# Patient Record
Sex: FEMALE | Race: BLACK OR AFRICAN AMERICAN | NOT HISPANIC OR LATINO | Employment: OTHER | URBAN - METROPOLITAN AREA
[De-identification: names, ages, dates, MRNs, and addresses within clinical notes are randomized per-mention and may not be internally consistent; named-entity substitution may affect disease eponyms.]

---

## 2017-01-18 ENCOUNTER — OFFICE VISIT (OUTPATIENT)
Dept: ENDOCRINOLOGY | Facility: CLINIC | Age: 57
End: 2017-01-18

## 2017-01-18 VITALS
SYSTOLIC BLOOD PRESSURE: 122 MMHG | DIASTOLIC BLOOD PRESSURE: 76 MMHG | HEIGHT: 62 IN | WEIGHT: 197.8 LBS | OXYGEN SATURATION: 99 % | BODY MASS INDEX: 36.4 KG/M2 | HEART RATE: 78 BPM

## 2017-01-18 DIAGNOSIS — IMO0001 UNCONTROLLED TYPE 2 DIABETES MELLITUS WITHOUT COMPLICATION, WITHOUT LONG-TERM CURRENT USE OF INSULIN: Primary | ICD-10-CM

## 2017-01-18 DIAGNOSIS — E78.2 MIXED HYPERLIPIDEMIA: ICD-10-CM

## 2017-01-18 DIAGNOSIS — E11.9 CONTROLLED TYPE 2 DIABETES MELLITUS WITHOUT COMPLICATION, WITHOUT LONG-TERM CURRENT USE OF INSULIN (HCC): Primary | ICD-10-CM

## 2017-01-18 LAB
ALBUMIN SERPL-MCNC: 4.4 G/DL (ref 3.2–4.8)
ALBUMIN/GLOB SERPL: 1.3 G/DL (ref 1.5–2.5)
ALP SERPL-CCNC: 112 U/L (ref 25–100)
ALT SERPL W P-5'-P-CCNC: 28 U/L (ref 7–40)
ANION GAP SERPL CALCULATED.3IONS-SCNC: 13 MMOL/L (ref 3–11)
ARTICHOKE IGE QN: 166 MG/DL (ref 0–130)
AST SERPL-CCNC: 34 U/L (ref 0–33)
BILIRUB SERPL-MCNC: 0.4 MG/DL (ref 0.3–1.2)
BUN BLD-MCNC: 14 MG/DL (ref 9–23)
BUN/CREAT SERPL: 17.5 (ref 7–25)
CALCIUM SPEC-SCNC: 9.9 MG/DL (ref 8.7–10.4)
CHLORIDE SERPL-SCNC: 105 MMOL/L (ref 99–109)
CHOLEST SERPL-MCNC: 236 MG/DL (ref 0–200)
CO2 SERPL-SCNC: 29 MMOL/L (ref 20–31)
CREAT BLD-MCNC: 0.8 MG/DL (ref 0.6–1.3)
GFR SERPL CREATININE-BSD FRML MDRD: 90 ML/MIN/1.73
GLOBULIN UR ELPH-MCNC: 3.4 GM/DL
GLUCOSE BLD-MCNC: 107 MG/DL (ref 70–100)
GLUCOSE BLDC GLUCOMTR-MCNC: 119 MG/DL (ref 70–130)
HBA1C MFR BLD: 6.8 %
HDLC SERPL-MCNC: 48 MG/DL (ref 40–60)
POTASSIUM BLD-SCNC: 4.2 MMOL/L (ref 3.5–5.5)
PROT SERPL-MCNC: 7.8 G/DL (ref 5.7–8.2)
SODIUM BLD-SCNC: 147 MMOL/L (ref 132–146)
TRIGL SERPL-MCNC: 117 MG/DL (ref 0–150)
TSH SERPL DL<=0.05 MIU/L-ACNC: 1.7 MIU/ML (ref 0.35–5.35)

## 2017-01-18 PROCEDURE — 84443 ASSAY THYROID STIM HORMONE: CPT | Performed by: INTERNAL MEDICINE

## 2017-01-18 PROCEDURE — 80061 LIPID PANEL: CPT | Performed by: INTERNAL MEDICINE

## 2017-01-18 PROCEDURE — 82043 UR ALBUMIN QUANTITATIVE: CPT | Performed by: INTERNAL MEDICINE

## 2017-01-18 PROCEDURE — 83036 HEMOGLOBIN GLYCOSYLATED A1C: CPT | Performed by: INTERNAL MEDICINE

## 2017-01-18 PROCEDURE — 82570 ASSAY OF URINE CREATININE: CPT | Performed by: INTERNAL MEDICINE

## 2017-01-18 PROCEDURE — 82947 ASSAY GLUCOSE BLOOD QUANT: CPT | Performed by: INTERNAL MEDICINE

## 2017-01-18 PROCEDURE — 99213 OFFICE O/P EST LOW 20 MIN: CPT | Performed by: INTERNAL MEDICINE

## 2017-01-18 PROCEDURE — 80053 COMPREHEN METABOLIC PANEL: CPT | Performed by: INTERNAL MEDICINE

## 2017-01-18 NOTE — MR AVS SNAPSHOT
Sophie Medina   1/18/2017 10:00 AM   Office Visit    Dept Phone:  [phone]   Encounter #:  [id number]    Provider:  Nicki Ayala MD   Department:  [facility] MEDICINE AND ENDOCRINOLOGY                Your Full Care Plan              Your Updated Medication List          This list is accurate as of: 1/18/17 11:32 AM.  Always use your most recent med list.                alclomethasone 0.05 % cream   Commonly known as:  ACLOVATE       coenzyme Q10 100 MG capsule       ELDERBERRY PO       Healy Seal 500 MG capsule       HAIR SKIN AND NAILS FORMULA PO       levocetirizine 5 MG tablet   Commonly known as:  XYZAL       * lidocaine 5 %   Commonly known as:  LIDODERM       * lidocaine 5 % ointment   Commonly known as:  XYLOCAINE       metFORMIN 500 MG tablet   Commonly known as:  GLUCOPHAGE   Take 1 tablet by mouth 2 (two) times a day with meals.       MINIVELLE 0.1 MG/24HR patch   Generic drug:  estradiol       potassium chloride 10 MEQ CR tablet   Commonly known as:  K-DUR       prenatal (CLASSIC) vitamin 28-0.8 MG tablet tablet       Red Yeast Rice 600 MG capsule       Turmeric 450 MG capsule       vitamin D3 5000 UNITS capsule capsule       ZINC + VITAMIN C MT       * Notice:  This list has 2 medication(s) that are the same as other medications prescribed for you. Read the directions carefully, and ask your doctor or other care provider to review them with you.            We Performed the Following     Comprehensive Metabolic Panel     Lipid Panel     Microalbumin / Creatinine Urine Ratio     POC Glucose Fingerstick     POC Glycosylated Hemoglobin (Hb A1C)     TSH       You Were Diagnosed With        Codes Comments    Controlled type 2 diabetes mellitus without complication, without long-term current use of insulin    -  Primary ICD-10-CM: E11.9  ICD-9-CM: 250.00     Uncontrolled type 2 diabetes mellitus without complication, without long-term current use of  "insulin     ICD-10-CM: E11.65  ICD-9-CM: 250.02       Instructions     None    Patient Instructions History      Upcoming Appointments     Visit Type Date Time Department    [facility] 2017 10:00 AM [facility]    FOLLOW UP 2017  9:00 AM [facility]      MyChart Signup     [facility] [employer] allows you to send messages to your doctor, view your test results, renew your prescriptions, schedule appointments, and more. To sign up, go to [employer] and click on the Sign Up Now link in the New User? box. Enter your [employer] Activation Code exactly as it appears below along with the last four digits of your Social Security Number and your Date of Birth () to complete the sign-up process. If you do not sign up before the expiration date, you must request a new code.    [employer] Activation Code: [email or web address]  Expires: 2017 11:32 AM    If you have questions, you can email [email or web address] or call [phone] to talk to our [employer] staff. Remember, [employer] is NOT to be used for urgent needs. For medical emergencies, dial 911.               Other Info from Your Visit           Your Appointments     2017  9:00 AM EDT   Follow Up with Nicki Ayala MD   [facility] INTER[facility]ICINE [facility]OCRINOLOGY (--)    [address] [place] [id number] [phone]           Arrive 15 minutes prior to appointment.              Allergies     No Known Allergies      Reason for Visit     Diabetes Follow UP  Retired . doing good.       Vital Signs     Blood Pressure Pulse Height Weight Oxygen Saturation Body Mass Index    122/76 78 62\" (157.5 cm) 197 lb 12.8 oz (89.7 kg) 99% 36.18 kg/m2    Smoking Status                   Never Smoker           Problems and Diagnoses Noted     Uncontrolled diabetes mellitus type 2 without complications      Results     POC Glucose Fingerstick      Component Value Standard Range & Units    Glucose 119 " 70 - 130 mg/dL                POC Glycosylated Hemoglobin (Hb A1C)      Component Value Standard Range & Units    Hemoglobin A1C 6.8 %

## 2017-01-18 NOTE — PROGRESS NOTES
"Chief Complaint   Patient presents with   • Diabetes     Follow UP         HPI:   Sophie Medina is a 56 y.o.female who returns to endocrine clinic for f/u evaluation of pt's Type 2 DM. Last visit 08/31/2016. Her history is as follows:    Interim Events: no new medical issues. No new medications.      1) Type 2 DM with no known associated complications at this time:  - diagnosed in 2014 during routine evaluation. Presented with c/o polyuria  - pt states she did not tolerate the ER metformin prescribed in 8/2016 - said it caused \"low blood sugars\" and \"dizziness\"    Current DM Medications:  - metformin 500 mg BID    Glucometer Review: pre-meal BGs in AM and occasionally pre-dinner reviewed on her iPhone. Majority were <150    DM Health Maintenance:  Ophtho: Last exam 2016 - no retinopathy per pt  Podiatry: N/A  Monofilament: due 2017  Lipids: (01/2017) Tchol 236, , HDL 48,  - pt taking red yeast rice  Urine Microalb/Cr ratio: (01/2017) 2.4 - normal  TSH: (01/2017) 1.702 - normal  Aspirin: N/A    Other medical history: recently diagnosed with RA, but not on plaquenil yet     Review of Systems   Constitutional: Negative for fatigue.   HENT: Negative.    Eyes: Positive for itching. Negative for pain, redness and visual disturbance.   Respiratory: Positive for shortness of breath (with exertion).    Cardiovascular: Negative.  Negative for chest pain.   Gastrointestinal: Negative.    Endocrine: Negative for polydipsia, polyphagia and polyuria.        Hot flashes   Genitourinary: Positive for pelvic pain.   Musculoskeletal: Positive for arthralgias, joint swelling and myalgias.   Skin: Negative.    Neurological: Positive for weakness. Negative for dizziness and headaches.   Hematological: Negative.    Psychiatric/Behavioral: Negative.  Negative for sleep disturbance.     The following portions of the patient's history were reviewed and updated as appropriate: allergies, current medications, past family " "history, past medical history, past social history, past surgical history and problem list.    Visit Vitals   • /76   • Pulse 78   • Ht 62\" (157.5 cm)   • Wt 197 lb 12.8 oz (89.7 kg)   • SpO2 99%   • BMI 36.18 kg/m2     Physical Exam   Constitutional: She is oriented to person, place, and time. She appears well-developed. No distress.   Obese     HENT:   Head: Normocephalic.   Mouth/Throat: Oropharynx is clear and moist.   Eyes: Conjunctivae and EOM are normal. Pupils are equal, round, and reactive to light.   Neck: Neck supple. No thyromegaly present.   No palpable thyroid nodules     Cardiovascular: Normal rate, regular rhythm and normal heart sounds.    No murmur heard.  Pulmonary/Chest: Effort normal and breath sounds normal. She has no wheezes.   Abdominal: Soft. Bowel sounds are normal. She exhibits no mass. There is no tenderness.   Musculoskeletal: She exhibits no edema.   Lymphadenopathy:     She has no cervical adenopathy.   Neurological: She is alert and oriented to person, place, and time. No cranial nerve deficit.   Skin: Skin is warm. No rash noted. No erythema.   Acanthosis nigricans   Psychiatric: She has a normal mood and affect. Her behavior is normal.   Vitals reviewed.    LABS/IMAGING: outside records reviewed and summarized in HPI  Results for orders placed or performed in visit on 01/18/17   Comprehensive Metabolic Panel   Result Value Ref Range    Glucose 107 (H) 70 - 100 mg/dL    BUN 14 9 - 23 mg/dL    Creatinine 0.80 0.60 - 1.30 mg/dL    Sodium 147 (H) 132 - 146 mmol/L    Potassium 4.2 3.5 - 5.5 mmol/L    Chloride 105 99 - 109 mmol/L    CO2 29.0 20.0 - 31.0 mmol/L    Calcium 9.9 8.7 - 10.4 mg/dL    Total Protein 7.8 5.7 - 8.2 g/dL    Albumin 4.40 3.20 - 4.80 g/dL    ALT (SGPT) 28 7 - 40 U/L    AST (SGOT) 34 (H) 0 - 33 U/L    Alkaline Phosphatase 112 (H) 25 - 100 U/L    Total Bilirubin 0.4 0.3 - 1.2 mg/dL    eGFR  African Amer 90 >60 mL/min/1.73    Globulin 3.4 gm/dL    A/G Ratio 1.3 " (L) 1.5 - 2.5 g/dL    BUN/Creatinine Ratio 17.5 7.0 - 25.0    Anion Gap 13.0 (H) 3.0 - 11.0 mmol/L   Lipid Panel   Result Value Ref Range    Total Cholesterol 236 (H) 0 - 200 mg/dL    Triglycerides 117 0 - 150 mg/dL    HDL Cholesterol 48 40 - 60 mg/dL    LDL Cholesterol  166 (H) 0 - 130 mg/dL   TSH   Result Value Ref Range    TSH 1.702 0.350 - 5.350 mIU/mL   Microalbumin / Creatinine Urine Ratio   Result Value Ref Range    Creatinine, Urine 148.0 Not Estab. mg/dL    Microalbumin, Urine 3.6 Not Estab. ug/mL    Microalbumin/Creatinine Ratio Urine 2.4 0.0 - 30.0 mg/g creat   POC Glucose Fingerstick   Result Value Ref Range    Glucose 119 70 - 130 mg/dL   POC Glycosylated Hemoglobin (Hb A1C)   Result Value Ref Range    Hemoglobin A1C 6.8 %     ASSESSMENT/PLAN:  1) Type 2 DM with no associated complications at this time: controlled  - Hgb A1C% 6.8 today, down from 8.0  - I counseled pt on potential microvascular and macrovascular complications from uncontrolled diabetes; the significant positive effect carb consistent meal planning and modest weight loss can have on glycemic control; blood glucose goals for complication prevention; and mechanism of action of metformin  - continue metformin 500 mg BID  - counseled pt to check BG three times a week, pre-meal and 2 hr post-prandial varying the meal at each check    2) mixed hyperlipidemia  - lipid panel checked today  - Counseled pt that a statin is recommended for primary prevention of CVD  Although she is on red yeast rice, this provides minimal equivalent of Lovastatin and is not effective at primary prevention of CVD. Is not considered standard of care per ADA guidelines. Pt will contact me if she is agreeable to starting pravastatin 20 mg daily    RTC 6 months    Pt called and agreeable to starting pravastatin 20 mg daily. Stopping red yeast rice

## 2017-01-19 LAB
CREAT 24H UR-MCNC: 148 MG/DL
MICROALB/CRT. RATIO UR: 2.4 MG/G CREAT (ref 0–30)
MICROALBUMIN UR-MCNC: 3.6 UG/ML

## 2017-01-22 ENCOUNTER — TELEPHONE (OUTPATIENT)
Dept: ENDOCRINOLOGY | Facility: CLINIC | Age: 57
End: 2017-01-22

## 2017-01-22 PROBLEM — E78.2 MIXED HYPERLIPIDEMIA: Status: ACTIVE | Noted: 2017-01-22

## 2017-01-22 RX ORDER — PRAVASTATIN SODIUM 20 MG
20 TABLET ORAL DAILY
Qty: 30 TABLET | Refills: 11 | Status: SHIPPED | OUTPATIENT
Start: 2017-01-22 | End: 2017-09-14 | Stop reason: DRUGHIGH

## 2017-05-18 RX ORDER — LANCETS 28 GAUGE
EACH MISCELLANEOUS
Qty: 100 EACH | Refills: 5 | Status: SHIPPED | OUTPATIENT
Start: 2017-05-18 | End: 2021-11-30 | Stop reason: SDUPTHER

## 2017-07-26 ENCOUNTER — TELEPHONE (OUTPATIENT)
Dept: INTERNAL MEDICINE | Facility: CLINIC | Age: 57
End: 2017-07-26

## 2017-07-26 DIAGNOSIS — E78.2 MIXED HYPERLIPIDEMIA: ICD-10-CM

## 2017-07-26 DIAGNOSIS — E11.9 CONTROLLED TYPE 2 DIABETES MELLITUS WITHOUT COMPLICATION, WITHOUT LONG-TERM CURRENT USE OF INSULIN (HCC): Primary | ICD-10-CM

## 2017-09-13 ENCOUNTER — OFFICE VISIT (OUTPATIENT)
Dept: ENDOCRINOLOGY | Facility: CLINIC | Age: 57
End: 2017-09-13

## 2017-09-13 VITALS
DIASTOLIC BLOOD PRESSURE: 74 MMHG | WEIGHT: 190.7 LBS | OXYGEN SATURATION: 99 % | SYSTOLIC BLOOD PRESSURE: 124 MMHG | BODY MASS INDEX: 35.09 KG/M2 | HEIGHT: 62 IN | HEART RATE: 76 BPM

## 2017-09-13 DIAGNOSIS — E11.9 CONTROLLED TYPE 2 DIABETES MELLITUS WITHOUT COMPLICATION, WITHOUT LONG-TERM CURRENT USE OF INSULIN (HCC): Primary | ICD-10-CM

## 2017-09-13 DIAGNOSIS — E78.2 MIXED HYPERLIPIDEMIA: ICD-10-CM

## 2017-09-13 LAB
ALBUMIN SERPL-MCNC: 4.6 G/DL (ref 3.2–4.8)
ALBUMIN/GLOB SERPL: 1.5 G/DL (ref 1.5–2.5)
ALP SERPL-CCNC: 125 U/L (ref 25–100)
ALT SERPL W P-5'-P-CCNC: 28 U/L (ref 7–40)
ANION GAP SERPL CALCULATED.3IONS-SCNC: 4 MMOL/L (ref 3–11)
ARTICHOKE IGE QN: 122 MG/DL (ref 0–130)
AST SERPL-CCNC: 30 U/L (ref 0–33)
BILIRUB SERPL-MCNC: 0.4 MG/DL (ref 0.3–1.2)
BUN BLD-MCNC: 15 MG/DL (ref 9–23)
BUN/CREAT SERPL: 16.7 (ref 7–25)
CALCIUM SPEC-SCNC: 9.2 MG/DL (ref 8.7–10.4)
CHLORIDE SERPL-SCNC: 106 MMOL/L (ref 99–109)
CHOLEST SERPL-MCNC: 185 MG/DL (ref 0–200)
CO2 SERPL-SCNC: 28 MMOL/L (ref 20–31)
CREAT BLD-MCNC: 0.9 MG/DL (ref 0.6–1.3)
DEPRECATED RDW RBC AUTO: 39.6 FL (ref 37–54)
ERYTHROCYTE [DISTWIDTH] IN BLOOD BY AUTOMATED COUNT: 12.6 % (ref 11.3–14.5)
GFR SERPL CREATININE-BSD FRML MDRD: 79 ML/MIN/1.73
GLOBULIN UR ELPH-MCNC: 3.1 GM/DL
GLUCOSE BLD-MCNC: 88 MG/DL (ref 70–100)
GLUCOSE BLDC GLUCOMTR-MCNC: 114 MG/DL (ref 70–130)
HBA1C MFR BLD: 6.5 %
HCT VFR BLD AUTO: 39.9 % (ref 34.5–44)
HDLC SERPL-MCNC: 46 MG/DL (ref 40–60)
HGB BLD-MCNC: 13.2 G/DL (ref 11.5–15.5)
MCH RBC QN AUTO: 28.3 PG (ref 27–31)
MCHC RBC AUTO-ENTMCNC: 33.1 G/DL (ref 32–36)
MCV RBC AUTO: 85.6 FL (ref 80–99)
PLATELET # BLD AUTO: 312 10*3/MM3 (ref 150–450)
PMV BLD AUTO: 9.6 FL (ref 6–12)
POTASSIUM BLD-SCNC: 4.5 MMOL/L (ref 3.5–5.5)
PROT SERPL-MCNC: 7.7 G/DL (ref 5.7–8.2)
RBC # BLD AUTO: 4.66 10*6/MM3 (ref 3.89–5.14)
SODIUM BLD-SCNC: 138 MMOL/L (ref 132–146)
TRIGL SERPL-MCNC: 120 MG/DL (ref 0–150)
WBC NRBC COR # BLD: 7.33 10*3/MM3 (ref 3.5–10.8)

## 2017-09-13 PROCEDURE — 80061 LIPID PANEL: CPT | Performed by: INTERNAL MEDICINE

## 2017-09-13 PROCEDURE — 85027 COMPLETE CBC AUTOMATED: CPT | Performed by: INTERNAL MEDICINE

## 2017-09-13 PROCEDURE — 80053 COMPREHEN METABOLIC PANEL: CPT | Performed by: INTERNAL MEDICINE

## 2017-09-13 PROCEDURE — 83036 HEMOGLOBIN GLYCOSYLATED A1C: CPT | Performed by: INTERNAL MEDICINE

## 2017-09-13 PROCEDURE — 99213 OFFICE O/P EST LOW 20 MIN: CPT | Performed by: INTERNAL MEDICINE

## 2017-09-13 PROCEDURE — 82962 GLUCOSE BLOOD TEST: CPT | Performed by: INTERNAL MEDICINE

## 2017-09-13 NOTE — PROGRESS NOTES
"Chief Complaint   Patient presents with   • Diabetes     Follow Up        HPI:   Sophie Medina is a 56 y.o.female who returns to endocrine clinic for f/u evaluation of pt's Type 2 DM. Last visit 01/18/2017. Her history is as follows:    Interim Events: no new medical issues. Tolerating medications    1) Type 2 DM with no known associated complications at this time:  - diagnosed in 2014 during routine evaluation. Presented with c/o polyuria  - pt states she did not tolerate the ER metformin prescribed in 8/2016 - said it caused \"low blood sugars\" and \"dizziness\"    Current DM Medications:  - metformin 500 mg BID    Glucometer Review: no meter or log book for review    DM Health Maintenance:  Ophtho: Last exam 2016 - no retinopathy per pt  Podiatry: N/A  Monofilament / foot exam: 09/13/2017  Lipids: (09/2017) Tchol 185, , HDL 46, - pt on pravastatin 20 mg daily  Urine Microalb/Cr ratio: (01/2017) 2.4 - normal  TSH: (01/2017) 1.702 - normal  Aspirin: N/A    2) mixed hyperlipidemia:  - tolerating pravastatin 20 mg daily  - lipid panel checked today    Other medical history: diagnosed with RA, but not on plaquenil      Review of Systems   Constitutional: Negative for fatigue.        Weight loss   HENT: Negative.    Eyes: Negative for pain, redness, itching and visual disturbance.   Respiratory: Positive for shortness of breath (with exertion).    Cardiovascular: Negative.  Negative for chest pain.   Gastrointestinal: Negative.    Endocrine: Negative for polydipsia, polyphagia and polyuria.        Hot flashes   Genitourinary: Negative.    Musculoskeletal: Positive for arthralgias, joint swelling and myalgias.   Skin: Negative.    Neurological: Negative.  Negative for dizziness and headaches.   Hematological: Negative.    Psychiatric/Behavioral: Negative.  Negative for sleep disturbance.     The following portions of the patient's history were reviewed and updated as appropriate: allergies, current " "medications, past family history, past medical history, past social history, past surgical history and problem list.    /74  Pulse 76  Ht 62\" (157.5 cm)  Wt 190 lb 11.2 oz (86.5 kg)  SpO2 99%  BMI 34.88 kg/m2  Physical Exam   Constitutional: She is oriented to person, place, and time. She appears well-developed. No distress.   Obese     HENT:   Head: Normocephalic.   Mouth/Throat: Oropharynx is clear and moist.   Eyes: Conjunctivae and EOM are normal. Pupils are equal, round, and reactive to light.   Neck: Neck supple. No thyromegaly present.   No palpable thyroid nodules     Cardiovascular: Normal rate, regular rhythm and normal heart sounds.    No murmur heard.  Pulmonary/Chest: Effort normal and breath sounds normal. She has no wheezes.   Abdominal: Soft. Bowel sounds are normal. She exhibits no mass. There is no tenderness.   Musculoskeletal: She exhibits no edema.    Sophie had a diabetic foot exam performed today.   During the foot exam she had a monofilament test performed (normal sensation bilaterally).    Vascular Status -  Her exam exhibits right foot vasculature normal. Her exam exhibits no right foot edema. Her exam exhibits left foot vasculature normal. Her exam exhibits no left foot edema.   Skin Integrity  -  Her right foot skin is intact.   She has no right foot onychomycosis and non-callous right foot.    Sophie 's left foot skin is intact.  She has no left foot onychomycosis and non-callous left foot..  Lymphadenopathy:     She has no cervical adenopathy.   Neurological: She is alert and oriented to person, place, and time. No cranial nerve deficit.   Skin: Skin is warm. No rash noted. No erythema.   Acanthosis nigricans   Psychiatric: She has a normal mood and affect. Her behavior is normal.   Vitals reviewed.    LABS/IMAGING: outside records reviewed and summarized in HPI  Results for orders placed or performed in visit on 09/13/17   CBC (No Diff)   Result Value Ref Range    WBC " 7.33 3.50 - 10.80 10*3/mm3    RBC 4.66 3.89 - 5.14 10*6/mm3    Hemoglobin 13.2 11.5 - 15.5 g/dL    Hematocrit 39.9 34.5 - 44.0 %    MCV 85.6 80.0 - 99.0 fL    MCH 28.3 27.0 - 31.0 pg    MCHC 33.1 32.0 - 36.0 g/dL    RDW 12.6 11.3 - 14.5 %    RDW-SD 39.6 37.0 - 54.0 fl    MPV 9.6 6.0 - 12.0 fL    Platelets 312 150 - 450 10*3/mm3   Comprehensive Metabolic Panel   Result Value Ref Range    Glucose 88 70 - 100 mg/dL    BUN 15 9 - 23 mg/dL    Creatinine 0.90 0.60 - 1.30 mg/dL    Sodium 138 132 - 146 mmol/L    Potassium 4.5 3.5 - 5.5 mmol/L    Chloride 106 99 - 109 mmol/L    CO2 28.0 20.0 - 31.0 mmol/L    Calcium 9.2 8.7 - 10.4 mg/dL    Total Protein 7.7 5.7 - 8.2 g/dL    Albumin 4.60 3.20 - 4.80 g/dL    ALT (SGPT) 28 7 - 40 U/L    AST (SGOT) 30 0 - 33 U/L    Alkaline Phosphatase 125 (H) 25 - 100 U/L    Total Bilirubin 0.4 0.3 - 1.2 mg/dL    eGFR  African Amer 79 >60 mL/min/1.73    Globulin 3.1 gm/dL    A/G Ratio 1.5 1.5 - 2.5 g/dL    BUN/Creatinine Ratio 16.7 7.0 - 25.0    Anion Gap 4.0 3.0 - 11.0 mmol/L   Lipid Panel   Result Value Ref Range    Total Cholesterol 185 0 - 200 mg/dL    Triglycerides 120 0 - 150 mg/dL    HDL Cholesterol 46 40 - 60 mg/dL    LDL Cholesterol  122 0 - 130 mg/dL   POC Glucose Fingerstick   Result Value Ref Range    Glucose 114 70 - 130 mg/dL   POC Glycosylated Hemoglobin (Hb A1C)   Result Value Ref Range    Hemoglobin A1C 6.5 %     ASSESSMENT/PLAN:  1) Type 2 DM with no associated complications at this time: controlled  - Hgb A1C% 6.5 today, down from 8.0 in 08/2016  - continue metformin 500 mg BID  - counseled pt to check BG three times a week, pre-meal and 2 hr post-prandial varying the meal at each check    2) mixed hyperlipidemia  - CMP, lipid panel checked today. LDL improved, but not at goal of < 100  - Counseled pt that a statin is recommended for primary prevention of CVD  - increasing pravastatin to 40 mg daily    Lab results reviewed with patient on 09/14/2017    RTC 6  months

## 2017-09-14 RX ORDER — PRAVASTATIN SODIUM 40 MG
40 TABLET ORAL DAILY
Qty: 30 TABLET | Refills: 11 | Status: SHIPPED | OUTPATIENT
Start: 2017-09-14 | End: 2018-03-16 | Stop reason: DRUGHIGH

## 2017-09-27 DIAGNOSIS — IMO0001 UNCONTROLLED DIABETES MELLITUS TYPE 2 WITHOUT COMPLICATIONS: ICD-10-CM

## 2017-09-27 DIAGNOSIS — E11.9 CONTROLLED TYPE 2 DIABETES MELLITUS WITHOUT COMPLICATION, WITHOUT LONG-TERM CURRENT USE OF INSULIN (HCC): ICD-10-CM

## 2018-03-14 ENCOUNTER — OFFICE VISIT (OUTPATIENT)
Dept: ENDOCRINOLOGY | Facility: CLINIC | Age: 58
End: 2018-03-14

## 2018-03-14 VITALS
WEIGHT: 195 LBS | DIASTOLIC BLOOD PRESSURE: 64 MMHG | HEART RATE: 72 BPM | BODY MASS INDEX: 35.67 KG/M2 | OXYGEN SATURATION: 99 % | SYSTOLIC BLOOD PRESSURE: 128 MMHG

## 2018-03-14 DIAGNOSIS — E11.9 CONTROLLED TYPE 2 DIABETES MELLITUS WITHOUT COMPLICATION, WITHOUT LONG-TERM CURRENT USE OF INSULIN (HCC): Primary | ICD-10-CM

## 2018-03-14 DIAGNOSIS — E78.2 MIXED HYPERLIPIDEMIA: ICD-10-CM

## 2018-03-14 LAB
ALBUMIN SERPL-MCNC: 4.4 G/DL (ref 3.2–4.8)
ALBUMIN/GLOB SERPL: 1.4 G/DL (ref 1.5–2.5)
ALP SERPL-CCNC: 90 U/L (ref 25–100)
ALT SERPL W P-5'-P-CCNC: 28 U/L (ref 7–40)
ANION GAP SERPL CALCULATED.3IONS-SCNC: 7 MMOL/L (ref 3–11)
ARTICHOKE IGE QN: 160 MG/DL (ref 0–130)
AST SERPL-CCNC: 29 U/L (ref 0–33)
BILIRUB SERPL-MCNC: 0.4 MG/DL (ref 0.3–1.2)
BUN BLD-MCNC: 13 MG/DL (ref 9–23)
BUN/CREAT SERPL: 14.4 (ref 7–25)
CALCIUM SPEC-SCNC: 9.3 MG/DL (ref 8.7–10.4)
CHLORIDE SERPL-SCNC: 109 MMOL/L (ref 99–109)
CHOLEST SERPL-MCNC: 205 MG/DL (ref 0–200)
CO2 SERPL-SCNC: 28 MMOL/L (ref 20–31)
CREAT BLD-MCNC: 0.9 MG/DL (ref 0.6–1.3)
DEPRECATED RDW RBC AUTO: 41.5 FL (ref 37–54)
ERYTHROCYTE [DISTWIDTH] IN BLOOD BY AUTOMATED COUNT: 13 % (ref 11.3–14.5)
GFR SERPL CREATININE-BSD FRML MDRD: 78 ML/MIN/1.73
GLOBULIN UR ELPH-MCNC: 3.2 GM/DL
GLUCOSE BLD-MCNC: 84 MG/DL (ref 70–100)
GLUCOSE BLDC GLUCOMTR-MCNC: 86 MG/DL (ref 70–130)
HBA1C MFR BLD: 5.9 %
HCT VFR BLD AUTO: 40.1 % (ref 34.5–44)
HDLC SERPL-MCNC: 47 MG/DL (ref 40–60)
HGB BLD-MCNC: 13.2 G/DL (ref 11.5–15.5)
MCH RBC QN AUTO: 28.6 PG (ref 27–31)
MCHC RBC AUTO-ENTMCNC: 32.9 G/DL (ref 32–36)
MCV RBC AUTO: 87 FL (ref 80–99)
PLATELET # BLD AUTO: 283 10*3/MM3 (ref 150–450)
PMV BLD AUTO: 10.1 FL (ref 6–12)
POTASSIUM BLD-SCNC: 4.7 MMOL/L (ref 3.5–5.5)
PROT SERPL-MCNC: 7.6 G/DL (ref 5.7–8.2)
RBC # BLD AUTO: 4.61 10*6/MM3 (ref 3.89–5.14)
SODIUM BLD-SCNC: 144 MMOL/L (ref 132–146)
TRIGL SERPL-MCNC: 73 MG/DL (ref 0–150)
TSH SERPL DL<=0.05 MIU/L-ACNC: 1.25 MIU/ML (ref 0.35–5.35)
WBC NRBC COR # BLD: 6.47 10*3/MM3 (ref 3.5–10.8)

## 2018-03-14 PROCEDURE — 82947 ASSAY GLUCOSE BLOOD QUANT: CPT | Performed by: INTERNAL MEDICINE

## 2018-03-14 PROCEDURE — 84443 ASSAY THYROID STIM HORMONE: CPT | Performed by: INTERNAL MEDICINE

## 2018-03-14 PROCEDURE — 80061 LIPID PANEL: CPT | Performed by: INTERNAL MEDICINE

## 2018-03-14 PROCEDURE — 99213 OFFICE O/P EST LOW 20 MIN: CPT | Performed by: INTERNAL MEDICINE

## 2018-03-14 PROCEDURE — 80053 COMPREHEN METABOLIC PANEL: CPT | Performed by: INTERNAL MEDICINE

## 2018-03-14 PROCEDURE — 85027 COMPLETE CBC AUTOMATED: CPT | Performed by: INTERNAL MEDICINE

## 2018-03-14 PROCEDURE — 83036 HEMOGLOBIN GLYCOSYLATED A1C: CPT | Performed by: INTERNAL MEDICINE

## 2018-03-14 PROCEDURE — 82570 ASSAY OF URINE CREATININE: CPT | Performed by: INTERNAL MEDICINE

## 2018-03-14 PROCEDURE — 82043 UR ALBUMIN QUANTITATIVE: CPT | Performed by: INTERNAL MEDICINE

## 2018-03-14 RX ORDER — BLOOD-GLUCOSE METER
KIT MISCELLANEOUS
Refills: 4 | COMMUNITY
Start: 2018-02-08 | End: 2021-11-30 | Stop reason: SDUPTHER

## 2018-03-14 RX ORDER — LIDOCAINE 50 MG/G
PATCH TOPICAL
Refills: 5 | COMMUNITY
Start: 2017-12-22 | End: 2022-12-05

## 2018-03-14 RX ORDER — CYCLOBENZAPRINE HCL 10 MG
10 TABLET ORAL
Refills: 3 | COMMUNITY
Start: 2018-01-23 | End: 2018-09-19

## 2018-03-14 RX ORDER — HYDROXYCHLOROQUINE SULFATE 200 MG/1
1 TABLET, FILM COATED ORAL 2 TIMES DAILY
Refills: 3 | COMMUNITY
Start: 2018-02-12

## 2018-03-14 NOTE — PROGRESS NOTES
"Chief Complaint   Patient presents with   • Type 2 Diabetes Mellitus     HPI:   Sophie Medina is a 57 y.o.female who returns to endocrine clinic for f/u evaluation of pt's Type 2 DM. Last visit 09/13/2017. Her history is as follows:    Interim Events: no new medical issues. Tolerating medications    1) Type 2 DM with no known associated complications at this time:  - diagnosed in 2014 during routine evaluation. Presented with c/o polyuria  - pt states she did not tolerate the ER metformin prescribed in 8/2016 - said it caused \"low blood sugars\" and \"dizziness\"    Current DM Medications:  - metformin 500 mg BID    Glucometer Review: no meter or log book for review    DM Health Maintenance:  Ophtho: Last exam 2016 - no retinopathy per pt  Podiatry: N/A  Monofilament / foot exam: 09/13/2017  Lipids: (03/2018) Tchol 205, TG 73, HDL 47, - pt on pravastatin 40 mg daily  Urine Microalb/Cr ratio: (03/2018) 4.7 - normal  TSH: (03/2018) 1.245 - normal  Aspirin: N/A    2) mixed hyperlipidemia:  - tolerating pravastatin 40 mg daily  - lipid panel checked today (03/2018) Tchol 205, TG 73, HDL 47,     Other medical history: diagnosed with RA, but not on plaquenil      Review of Systems   Constitutional: Negative for fatigue.        Weight stable   HENT: Negative.    Eyes: Negative for pain, redness, itching and visual disturbance.   Respiratory: Positive for shortness of breath (with exertion).    Cardiovascular: Negative.  Negative for chest pain.   Gastrointestinal: Negative.    Endocrine: Negative for polydipsia, polyphagia and polyuria.        Hot flashes   Genitourinary: Negative.    Musculoskeletal: Positive for arthralgias, joint swelling and myalgias.   Skin: Negative.    Neurological: Negative.  Negative for dizziness and headaches.   Hematological: Negative.    Psychiatric/Behavioral: Negative.  Negative for sleep disturbance.     The following portions of the patient's history were reviewed and updated " as appropriate: allergies, current medications, past family history, past medical history, past social history, past surgical history and problem list.    /64   Pulse 72   Wt 88.5 kg (195 lb)   SpO2 99%   BMI 35.67 kg/m²   Physical Exam   Constitutional: She is oriented to person, place, and time. She appears well-developed. No distress.   Obese     HENT:   Head: Normocephalic.   Mouth/Throat: Oropharynx is clear and moist.   Eyes: Conjunctivae and EOM are normal. Pupils are equal, round, and reactive to light.   Neck: Neck supple. No thyromegaly present.   No palpable thyroid nodules     Cardiovascular: Normal rate, regular rhythm and normal heart sounds.    No murmur heard.  Pulmonary/Chest: Effort normal and breath sounds normal. She has no wheezes.   Abdominal: Soft. Bowel sounds are normal. She exhibits no mass. There is no tenderness.   Musculoskeletal: She exhibits no edema.   Lymphadenopathy:     She has no cervical adenopathy.   Neurological: She is alert and oriented to person, place, and time. No cranial nerve deficit.   Skin: Skin is warm. No rash noted. No erythema.   Acanthosis nigricans   Psychiatric: She has a normal mood and affect. Her behavior is normal.   Vitals reviewed.    LABS/IMAGING: outside records reviewed and summarized in HPI  Results for orders placed or performed in visit on 03/14/18   POC Glucose Fingerstick   Result Value Ref Range    Glucose 86 70 - 130 mg/dL   POC Glycosylated Hemoglobin (Hb A1C)   Result Value Ref Range    Hemoglobin A1C 5.9 %     ASSESSMENT/PLAN:  1) Type 2 DM with no associated complications at this time: controlled  - Hgb A1C% 5.9 today, down from 6.5 in 09/2017  - continue metformin 500 mg BID  - counseled pt to check BG three times a week, pre-meal and 2 hr post-prandial varying the meal at each check    2) mixed hyperlipidemia:   - CMP, lipid panel checked today. LDL not at goal of < 100  - Counseled pt that a statin is recommended for primary  prevention of CVD  - changing to atorvastatin 20 mg daily    Lab results reviewed with patient via phone on 03/16/2018    RTC 6 months

## 2018-03-16 LAB
CREAT 24H UR-MCNC: 123.9 MG/DL
MICROALBUMIN UR-MCNC: 5.8 UG/ML
MICROALBUMIN/CREAT UR: 4.7 MG/G CREAT (ref 0–30)

## 2018-03-16 RX ORDER — ATORVASTATIN CALCIUM 20 MG/1
20 TABLET, FILM COATED ORAL DAILY
Qty: 30 TABLET | Refills: 11 | Status: SHIPPED | OUTPATIENT
Start: 2018-03-16 | End: 2019-01-14 | Stop reason: SDUPTHER

## 2018-09-19 ENCOUNTER — OFFICE VISIT (OUTPATIENT)
Dept: ENDOCRINOLOGY | Facility: CLINIC | Age: 58
End: 2018-09-19

## 2018-09-19 VITALS
BODY MASS INDEX: 36.07 KG/M2 | SYSTOLIC BLOOD PRESSURE: 116 MMHG | OXYGEN SATURATION: 98 % | HEART RATE: 75 BPM | HEIGHT: 62 IN | WEIGHT: 196 LBS | DIASTOLIC BLOOD PRESSURE: 80 MMHG

## 2018-09-19 DIAGNOSIS — E11.9 CONTROLLED TYPE 2 DIABETES MELLITUS WITHOUT COMPLICATION, WITHOUT LONG-TERM CURRENT USE OF INSULIN (HCC): Primary | ICD-10-CM

## 2018-09-19 DIAGNOSIS — E78.2 MIXED HYPERLIPIDEMIA: ICD-10-CM

## 2018-09-19 LAB
ARTICHOKE IGE QN: 85 MG/DL (ref 0–130)
CHOLEST SERPL-MCNC: 144 MG/DL (ref 0–200)
GLUCOSE BLDC GLUCOMTR-MCNC: 118 MG/DL (ref 70–130)
HBA1C MFR BLD: 5.7 %
HDLC SERPL-MCNC: 53 MG/DL (ref 40–60)
TRIGL SERPL-MCNC: 75 MG/DL (ref 0–150)

## 2018-09-19 PROCEDURE — 82947 ASSAY GLUCOSE BLOOD QUANT: CPT | Performed by: INTERNAL MEDICINE

## 2018-09-19 PROCEDURE — 99213 OFFICE O/P EST LOW 20 MIN: CPT | Performed by: INTERNAL MEDICINE

## 2018-09-19 PROCEDURE — 80061 LIPID PANEL: CPT | Performed by: INTERNAL MEDICINE

## 2018-09-19 PROCEDURE — 83036 HEMOGLOBIN GLYCOSYLATED A1C: CPT | Performed by: INTERNAL MEDICINE

## 2018-10-02 ENCOUNTER — TELEPHONE (OUTPATIENT)
Dept: ENDOCRINOLOGY | Facility: CLINIC | Age: 58
End: 2018-10-02

## 2018-10-17 ENCOUNTER — APPOINTMENT (OUTPATIENT)
Dept: GENERAL RADIOLOGY | Facility: HOSPITAL | Age: 58
End: 2018-10-17

## 2018-10-17 ENCOUNTER — HOSPITAL ENCOUNTER (EMERGENCY)
Facility: HOSPITAL | Age: 58
Discharge: HOME | End: 2018-10-17
Attending: EMERGENCY MEDICINE | Admitting: EMERGENCY MEDICINE

## 2018-10-17 VITALS
BODY MASS INDEX: 35.7 KG/M2 | TEMPERATURE: 98.4 F | HEIGHT: 62 IN | HEART RATE: 89 BPM | DIASTOLIC BLOOD PRESSURE: 100 MMHG | RESPIRATION RATE: 20 BRPM | OXYGEN SATURATION: 98 % | SYSTOLIC BLOOD PRESSURE: 155 MMHG | WEIGHT: 194 LBS

## 2018-10-17 DIAGNOSIS — R07.9 CHEST PAIN, UNSPECIFIED TYPE: Primary | ICD-10-CM

## 2018-10-17 LAB
ALBUMIN SERPL-MCNC: 4.7 G/DL (ref 3.2–4.8)
ALBUMIN/GLOB SERPL: 1.8 G/DL (ref 1.5–2.5)
ALP SERPL-CCNC: 103 U/L (ref 25–100)
ALT SERPL W P-5'-P-CCNC: 25 U/L (ref 7–40)
ANION GAP SERPL CALCULATED.3IONS-SCNC: 7 MMOL/L (ref 3–11)
AST SERPL-CCNC: 26 U/L (ref 0–33)
BASOPHILS # BLD AUTO: 0.02 10*3/MM3 (ref 0–0.2)
BASOPHILS NFR BLD AUTO: 0.2 % (ref 0–1)
BILIRUB SERPL-MCNC: 0.5 MG/DL (ref 0.3–1.2)
BNP SERPL-MCNC: 17 PG/ML (ref 0–100)
BUN BLD-MCNC: 12 MG/DL (ref 9–23)
BUN/CREAT SERPL: 11.3 (ref 7–25)
CALCIUM SPEC-SCNC: 9.4 MG/DL (ref 8.7–10.4)
CHLORIDE SERPL-SCNC: 105 MMOL/L (ref 99–109)
CO2 SERPL-SCNC: 27 MMOL/L (ref 20–31)
CREAT BLD-MCNC: 1.06 MG/DL (ref 0.6–1.3)
DEPRECATED RDW RBC AUTO: 41.4 FL (ref 37–54)
EOSINOPHIL # BLD AUTO: 0.27 10*3/MM3 (ref 0–0.3)
EOSINOPHIL NFR BLD AUTO: 2.9 % (ref 0–3)
ERYTHROCYTE [DISTWIDTH] IN BLOOD BY AUTOMATED COUNT: 13.1 % (ref 11.3–14.5)
GFR SERPL CREATININE-BSD FRML MDRD: 65 ML/MIN/1.73
GLOBULIN UR ELPH-MCNC: 2.6 GM/DL
GLUCOSE BLD-MCNC: 65 MG/DL (ref 70–100)
HCT VFR BLD AUTO: 40.2 % (ref 34.5–44)
HGB BLD-MCNC: 13.3 G/DL (ref 11.5–15.5)
HOLD SPECIMEN: NORMAL
HOLD SPECIMEN: NORMAL
IMM GRANULOCYTES # BLD: 0.01 10*3/MM3 (ref 0–0.03)
IMM GRANULOCYTES NFR BLD: 0.1 % (ref 0–0.6)
LIPASE SERPL-CCNC: 26 U/L (ref 6–51)
LYMPHOCYTES # BLD AUTO: 1.61 10*3/MM3 (ref 0.6–4.8)
LYMPHOCYTES NFR BLD AUTO: 17.4 % (ref 24–44)
MCH RBC QN AUTO: 28.6 PG (ref 27–31)
MCHC RBC AUTO-ENTMCNC: 33.1 G/DL (ref 32–36)
MCV RBC AUTO: 86.5 FL (ref 80–99)
MONOCYTES # BLD AUTO: 0.96 10*3/MM3 (ref 0–1)
MONOCYTES NFR BLD AUTO: 10.4 % (ref 0–12)
NEUTROPHILS # BLD AUTO: 6.4 10*3/MM3 (ref 1.5–8.3)
NEUTROPHILS NFR BLD AUTO: 69.1 % (ref 41–71)
PLATELET # BLD AUTO: 281 10*3/MM3 (ref 150–450)
PMV BLD AUTO: 9.9 FL (ref 6–12)
POTASSIUM BLD-SCNC: 3.7 MMOL/L (ref 3.5–5.5)
PROT SERPL-MCNC: 7.3 G/DL (ref 5.7–8.2)
RBC # BLD AUTO: 4.65 10*6/MM3 (ref 3.89–5.14)
SODIUM BLD-SCNC: 139 MMOL/L (ref 132–146)
TROPONIN I SERPL-MCNC: 0 NG/ML (ref 0–0.07)
WBC NRBC COR # BLD: 9.26 10*3/MM3 (ref 3.5–10.8)
WHOLE BLOOD HOLD SPECIMEN: NORMAL
WHOLE BLOOD HOLD SPECIMEN: NORMAL

## 2018-10-17 PROCEDURE — 80053 COMPREHEN METABOLIC PANEL: CPT | Performed by: EMERGENCY MEDICINE

## 2018-10-17 PROCEDURE — 96374 THER/PROPH/DIAG INJ IV PUSH: CPT

## 2018-10-17 PROCEDURE — 99285 EMERGENCY DEPT VISIT HI MDM: CPT

## 2018-10-17 PROCEDURE — 85025 COMPLETE CBC W/AUTO DIFF WBC: CPT | Performed by: EMERGENCY MEDICINE

## 2018-10-17 PROCEDURE — 83880 ASSAY OF NATRIURETIC PEPTIDE: CPT | Performed by: EMERGENCY MEDICINE

## 2018-10-17 PROCEDURE — 84484 ASSAY OF TROPONIN QUANT: CPT

## 2018-10-17 PROCEDURE — 93005 ELECTROCARDIOGRAM TRACING: CPT | Performed by: EMERGENCY MEDICINE

## 2018-10-17 PROCEDURE — 83690 ASSAY OF LIPASE: CPT | Performed by: EMERGENCY MEDICINE

## 2018-10-17 PROCEDURE — 71045 X-RAY EXAM CHEST 1 VIEW: CPT

## 2018-10-17 RX ORDER — ALUMINA, MAGNESIA, AND SIMETHICONE 2400; 2400; 240 MG/30ML; MG/30ML; MG/30ML
15 SUSPENSION ORAL ONCE
Status: COMPLETED | OUTPATIENT
Start: 2018-10-17 | End: 2018-10-17

## 2018-10-17 RX ORDER — RANITIDINE 150 MG/1
150 TABLET ORAL 2 TIMES DAILY
Qty: 30 TABLET | Refills: 0 | Status: SHIPPED | OUTPATIENT
Start: 2018-10-17 | End: 2020-02-10

## 2018-10-17 RX ORDER — FAMOTIDINE 10 MG/ML
20 INJECTION, SOLUTION INTRAVENOUS ONCE
Status: COMPLETED | OUTPATIENT
Start: 2018-10-17 | End: 2018-10-17

## 2018-10-17 RX ORDER — ASPIRIN 81 MG/1
324 TABLET, CHEWABLE ORAL ONCE
Status: COMPLETED | OUTPATIENT
Start: 2018-10-17 | End: 2018-10-17

## 2018-10-17 RX ORDER — SUCRALFATE 1 G/1
1 TABLET ORAL
Qty: 60 TABLET | Refills: 0 | Status: SHIPPED | OUTPATIENT
Start: 2018-10-17 | End: 2019-01-23

## 2018-10-17 RX ORDER — SODIUM CHLORIDE 0.9 % (FLUSH) 0.9 %
10 SYRINGE (ML) INJECTION AS NEEDED
Status: DISCONTINUED | OUTPATIENT
Start: 2018-10-17 | End: 2018-10-17 | Stop reason: HOSPADM

## 2018-10-17 RX ADMIN — ASPIRIN 81 MG CHEWABLE TABLET 324 MG: 81 TABLET CHEWABLE at 18:36

## 2018-10-17 RX ADMIN — LIDOCAINE HYDROCHLORIDE 15 ML: 20 SOLUTION ORAL; TOPICAL at 19:13

## 2018-10-17 RX ADMIN — FAMOTIDINE 20 MG: 10 INJECTION, SOLUTION INTRAVENOUS at 19:11

## 2018-10-17 RX ADMIN — ALUMINUM HYDROXIDE, MAGNESIUM HYDROXIDE, AND DIMETHICONE 15 ML: 400; 400; 40 SUSPENSION ORAL at 19:14

## 2018-10-17 NOTE — ED PROVIDER NOTES
Subjective   Sophie Medina is a 58 y.o.female who presents to the ED with c/o chest pain with onset at 2100 last night. She reports that she suddenly developed substernal chest pain that radiates into her left arm and back. She also notes that she developed SoA secondary to her chest pain which prompted her visit to the ED. Her pain is intermittent in nature. She states that she took a nap earlier today however still experienced the same dull, achy type sensations. Her pain does not improve or worsen with any particular activity. She denies any dizziness, or any other complaints at this time.        History provided by:  Patient  Chest Pain   Pain location:  Substernal area  Pain quality: aching and dull    Pain radiates to:  L arm and upper back  Pain severity:  Moderate  Onset quality:  Sudden  Timing:  Intermittent  Progression:  Worsening  Chronicity:  New  Relieved by:  None tried  Worsened by:  Nothing  Ineffective treatments:  None tried  Associated symptoms: back pain and shortness of breath    Associated symptoms: no dizziness        Review of Systems   Respiratory: Positive for shortness of breath.    Cardiovascular: Positive for chest pain.   Musculoskeletal: Positive for back pain.   Neurological: Negative for dizziness.   All other systems reviewed and are negative.      Past Medical History:   Diagnosis Date   • Asthma    • DM (diabetes mellitus), type 2, uncontrolled (CMS/HCC)    • Hyperlipidemia    • Rheumatoid arthritis (CMS/HCC)        No Known Allergies    Past Surgical History:   Procedure Laterality Date   • HYSTERECTOMY  02/2014       Family History   Problem Relation Age of Onset   • Heart attack Mother    • Arthritis Father    • Diabetes Father    • Hypertension Father        Social History     Social History   • Marital status:      Social History Main Topics   • Smoking status: Never Smoker   • Smokeless tobacco: Never Used   • Alcohol use No   • Drug use: No   • Sexual activity:  Defer     Other Topics Concern   • Not on file         Objective   Physical Exam   Constitutional: She is oriented to person, place, and time. She appears well-developed and well-nourished. No distress.   HENT:   Head: Normocephalic and atraumatic.   Nose: Nose normal.   Eyes: Conjunctivae are normal. No scleral icterus.   Neck: Normal range of motion. Neck supple.   Cardiovascular: Normal rate, regular rhythm and normal heart sounds.    No murmur heard.  Pulmonary/Chest: Effort normal and breath sounds normal. No respiratory distress.   Abdominal: Soft. Bowel sounds are normal. There is no tenderness.   Neurological: She is alert and oriented to person, place, and time.   Skin: Skin is warm and dry.   Psychiatric: She has a normal mood and affect. Her behavior is normal.   Nursing note and vitals reviewed.      Procedures         ED Course     EKG NSR.  CXR negative.  Labs benign.  Symptoms resolved after GI meds.  Chest pain free on rechecks.  Workup is negative for acute serious pathology.  I discussed findings and concerns with patient in detail.  We discussed in particular that while acute MI has been excluded today, it is not possible to exclude underlying CAD based on ED workup alone.  We discussed the importance of immediate-term followup for provocative testing such as stress testing as well as methods of arranging that.  We discussed the importance of office followup subsequent to testing to discuss results and any further testing or treatment that may be indicated.  Patient is advised to return to the ED for any concerning symptoms, especially prior to the completion of further outpatient testing.                  MDM  Number of Diagnoses or Management Options  Chest pain, unspecified type:      Amount and/or Complexity of Data Reviewed  Clinical lab tests: ordered and reviewed  Tests in the radiology section of CPT®: ordered and reviewed  Independent visualization of images, tracings, or specimens:  yes        Final diagnoses:   Chest pain, unspecified type       Documentation assistance provided by lala Barbosa.  Information recorded by the brianibreginald was done at my direction and has been verified and validated by me.     Alfonso Barbosa  10/17/18 [id number]       Jacob Moran MD  10/17/18 [id number]

## 2018-10-18 NOTE — DISCHARGE INSTRUCTIONS
Examination and testing today did not reveal a specific cause of your chest pain.  It is important to understand that this does not mean ?nothing is wrong? but rather that there is currently no evidence to support a specific diagnosis.  Some diseases, like heart disease, can be very serious but develop slowly over time and often the testing available in the ED will be ?normal? or ?nondiagnostic? even when significant blockage in the arteries is present.  This is why it is very important for you to have close followup for further testing, for example a stress test.  You are being referred to our chest pain followup clinic to help facilitate and expedite this further testing.  Please do not put off or delay further evaluation as the consequences may be severe.  If at any time you have worsening symptoms or develop any change in your condition that concerns you, please return to the ED for immediate evaluation.

## 2018-10-19 ENCOUNTER — TELEPHONE (OUTPATIENT)
Dept: CARDIOLOGY | Facility: HOSPITAL | Age: 58
End: 2018-10-19

## 2018-10-19 ENCOUNTER — OFFICE VISIT (OUTPATIENT)
Dept: CARDIOLOGY | Facility: HOSPITAL | Age: 58
End: 2018-10-19

## 2018-10-19 ENCOUNTER — HOSPITAL ENCOUNTER (OUTPATIENT)
Dept: CARDIOLOGY | Facility: HOSPITAL | Age: 58
Discharge: HOME | End: 2018-10-19
Admitting: NURSE PRACTITIONER

## 2018-10-19 VITALS
HEIGHT: 62 IN | BODY MASS INDEX: 37.17 KG/M2 | HEART RATE: 83 BPM | RESPIRATION RATE: 18 BRPM | WEIGHT: 202 LBS | OXYGEN SATURATION: 99 % | TEMPERATURE: 97.2 F | DIASTOLIC BLOOD PRESSURE: 90 MMHG | SYSTOLIC BLOOD PRESSURE: 136 MMHG

## 2018-10-19 DIAGNOSIS — E11.9 CONTROLLED TYPE 2 DIABETES MELLITUS WITHOUT COMPLICATION, WITHOUT LONG-TERM CURRENT USE OF INSULIN (HCC): ICD-10-CM

## 2018-10-19 DIAGNOSIS — R06.02 SHORTNESS OF BREATH: ICD-10-CM

## 2018-10-19 DIAGNOSIS — E78.2 MIXED HYPERLIPIDEMIA: ICD-10-CM

## 2018-10-19 DIAGNOSIS — R07.2 PRECORDIAL CHEST PAIN: Primary | ICD-10-CM

## 2018-10-19 DIAGNOSIS — R07.2 PRECORDIAL CHEST PAIN: ICD-10-CM

## 2018-10-19 LAB
BH CV STRESS BP STAGE 1: NORMAL
BH CV STRESS BP STAGE 3: NORMAL
BH CV STRESS COMMENTS STAGE 1: NORMAL
BH CV STRESS DOSE REGADENOSON STAGE 1: 0.4
BH CV STRESS DURATION MIN STAGE 1: 0
BH CV STRESS DURATION MIN STAGE 2: 1
BH CV STRESS DURATION MIN STAGE 3: 1
BH CV STRESS DURATION MIN STAGE 4: 1
BH CV STRESS DURATION SEC STAGE 1: 10
BH CV STRESS DURATION SEC STAGE 2: 0
BH CV STRESS HR STAGE 1: 102
BH CV STRESS HR STAGE 2: 107
BH CV STRESS HR STAGE 3: 104
BH CV STRESS HR STAGE 4: 100
BH CV STRESS PROTOCOL 1: NORMAL
BH CV STRESS RECOVERY BP: NORMAL MMHG
BH CV STRESS RECOVERY HR: 93 BPM
BH CV STRESS STAGE 1: 1
BH CV STRESS STAGE 2: 2
BH CV STRESS STAGE 3: 3
BH CV STRESS STAGE 4: 4
LV EF NUC BP: 62 %
MAXIMAL PREDICTED HEART RATE: 163 BPM
PERCENT MAX PREDICTED HR: 65.64 %
STRESS BASELINE BP: NORMAL MMHG
STRESS BASELINE HR: 77 BPM
STRESS PERCENT HR: 77 %
STRESS POST PEAK BP: NORMAL MMHG
STRESS POST PEAK HR: 107 BPM
STRESS TARGET HR: 139 BPM

## 2018-10-19 PROCEDURE — 78492 MYOCRD IMG PET MLT RST&STRS: CPT | Performed by: INTERNAL MEDICINE

## 2018-10-19 PROCEDURE — A9555 RB82 RUBIDIUM: HCPCS | Performed by: NURSE PRACTITIONER

## 2018-10-19 PROCEDURE — 93018 CV STRESS TEST I&R ONLY: CPT | Performed by: INTERNAL MEDICINE

## 2018-10-19 PROCEDURE — 93017 CV STRESS TEST TRACING ONLY: CPT

## 2018-10-19 PROCEDURE — 25010000002 REGADENOSON 0.4 MG/5ML SOLUTION: Performed by: NURSE PRACTITIONER

## 2018-10-19 PROCEDURE — 0 RUBIDIUM CHLORIDE: Performed by: NURSE PRACTITIONER

## 2018-10-19 PROCEDURE — 78492 MYOCRD IMG PET MLT RST&STRS: CPT

## 2018-10-19 PROCEDURE — 99214 OFFICE O/P EST MOD 30 MIN: CPT | Performed by: NURSE PRACTITIONER

## 2018-10-19 RX ORDER — CONJUGATED ESTROGENS 0.62 MG/G
CREAM VAGINAL
Refills: 5 | COMMUNITY
Start: 2018-09-04 | End: 2019-08-06

## 2018-10-19 RX ORDER — LOPERAMIDE HYDROCHLORIDE 2 MG/1
2 CAPSULE ORAL DAILY PRN
COMMUNITY

## 2018-10-19 RX ADMIN — REGADENOSON 0.4 MG: 0.08 INJECTION, SOLUTION INTRAVENOUS at 12:49

## 2018-10-19 RX ADMIN — RUBIDIUM CHLORIDE RB-82 1 DOSE: 150 INJECTION, SOLUTION INTRAVENOUS at 12:40

## 2018-10-19 RX ADMIN — RUBIDIUM CHLORIDE RB-82 1 DOSE: 150 INJECTION, SOLUTION INTRAVENOUS at 12:50

## 2018-10-19 NOTE — PROGRESS NOTES
[facility]  [facility]  Chest Pain Clinic    Encounter Date:10/19/2018     Sophie Medina  [address] [address] [facility] [id number] [phone]    1960    Lizz Marinelli APRN    Sophie Medina is a 58 y.o. female.      Subjective:     Chief Complaint:  Establish Care and Chest Pain       HPI patient presents to the chest pain clinic today for ongoing evaluation of her new onset of chest pain.  She presented the [facility] ED on 10/17/2018 with complaints of chest pain that it started earlier that evening.She described the chest pain as substernal sharp in nature with radiation to left arm and back.  She had associated dyspnea.  Her pain does not worsen or improve with any particular activity. She was given GI cocktail in the ED and notes that cp has resolved. She denies associated dizziness, presyncope or nausea.  She has a history of controlled type 2 diabetes and hyperlipidemia.    Cardiac risk factors:   dyslipidemia, diabetes   sedentary lifestyle, obesity (BMI > 30),  age (>50)      No Known Allergies      Current Outpatient Prescriptions:   •  ADVAIR DISKUS 250-50 MCG/DOSE DISKUS, Inhale 1 puff 2 (Two) Times a Day., Disp: , Rfl: 6  •  alclomethasone (ACLOVATE) 0.05 % cream, Apply  topically to the appropriate area as directed Daily., Disp: , Rfl: 3  •  atorvastatin (LIPITOR) 20 MG tablet, Take 1 tablet by mouth Daily., Disp: 30 tablet, Rfl: 11  •  hydroxychloroquine (PLAQUENIL) 200 MG tablet, Take 1 tablet by mouth 2 (Two) Times a Day., Disp: , Rfl: 3  •  levocetirizine (XYZAL) 5 MG tablet, Take 5 mg by mouth Daily., Disp: , Rfl: 3  •  lidocaine (LIDODERM) 5 %, APPLY 1 PATCH TO THE AFFECTED AREA & LEAVE IN PLACE FOR 12 HRS, THEN REMOVE & LEAVE OFF FOR 12 HRS, Disp: , Rfl: 5  •  lidocaine (XYLOCAINE) 5 % ointment, APPLY TO AFFECTED AREAS AS DIRECTED., Disp: , Rfl: 5  •  loperamide (IMODIUM) 2 MG capsule, Take 2 mg by mouth Daily As Needed., Disp: , Rfl:   •   metFORMIN (GLUCOPHAGE) 500 MG tablet, Take 1 tablet by mouth Daily., Disp: 30 tablet, Rfl: 11  •  MINIVELLE 0.1 MG/24HR patch, Place 1 patch on the skin as directed by provider 2 (Two) Times a Week., Disp: , Rfl: 5  •  Multiple Vitamins-Minerals (HAIR SKIN AND NAILS FORMULA PO), Take 1 tablet by mouth Daily., Disp: , Rfl:   •  PREMARIN 0.625 MG/GM vaginal cream, INSERT 0.5 GRAMS VAGINALLY NIGHTLY FOR 14 NIGHTS, THEN TWICE WEEKLY THEREAFTER, Disp: , Rfl: 5  •  Prenatal Vit-Fe Fumarate-FA (PRENATAL, CLASSIC, VITAMIN) 28-0.8 MG tablet tablet, Take  by mouth daily., Disp: , Rfl:   •  PROAIR  (90 BASE) MCG/ACT inhaler, Inhale 2 puffs Every 6 (Six) Hours As Needed., Disp: , Rfl: 6  •  FREESTYLE LITE test strip, Bid, Disp: , Rfl: 4  •  Lancets (FREESTYLE) lancets, TEST EVERY DAY, Disp: 100 each, Rfl: 5  •  raNITIdine (ZANTAC) 150 MG tablet, Take 1 tablet by mouth 2 (Two) Times a Day., Disp: 30 tablet, Rfl: 0  •  sucralfate (CARAFATE) 1 g tablet, Take 1 tablet by mouth 4 (Four) Times a Day Before Meals & at Bedtime., Disp: 60 tablet, Rfl: 0    The following portions of the patient's history were reviewed and updated as appropriate in Epic:  Problem list, allergies, current medications, past medical and surgical history, past social and family history.     Review of Systems   Constitution: Negative for chills, decreased appetite, diaphoresis, fever, weakness, malaise/fatigue, night sweats, weight gain and weight loss.   HENT: Negative for congestion, hearing loss, hoarse voice and nosebleeds.    Eyes: Negative for blurred vision, visual disturbance and visual halos.   Cardiovascular: Positive for chest pain. Negative for claudication, cyanosis, dyspnea on exertion, irregular heartbeat, leg swelling, near-syncope, orthopnea, palpitations, paroxysmal nocturnal dyspnea and syncope.   Respiratory: Positive for snoring and sputum production. Negative for cough, hemoptysis, shortness of breath, sleep disturbances due to  "breathing and wheezing.    Endocrine: Positive for cold intolerance.   Hematologic/Lymphatic: Negative for bleeding problem. Does not bruise/bleed easily.   Skin: Negative for dry skin, itching and rash.   Musculoskeletal: Negative for arthritis, falls, joint pain, joint swelling and myalgias.   Gastrointestinal: Positive for heartburn. Negative for bloating, abdominal pain, constipation, diarrhea, flatus, hematemesis, hematochezia, melena, nausea and vomiting.   Genitourinary: Negative for dysuria, frequency, hematuria, nocturia and urgency.   Neurological: Negative for excessive daytime sleepiness, dizziness, headaches, light-headedness and loss of balance.   Psychiatric/Behavioral: Negative for depression. The patient has insomnia. The patient is not nervous/anxious.        Objective:     Vitals:    10/19/18 0926 10/19/18 0929 10/19/18 0930   BP: 137/85 136/85 136/90   BP Location: Right arm Left arm Left arm   Patient Position: Sitting Sitting Sitting   Cuff Size: Adult     Pulse: 72 75 83   Resp: 18     Temp: 97.2 °F (36.2 °C)     TempSrc: Temporal Artery      SpO2: 99%     Weight: 91.6 kg (202 lb)     Height: 157.5 cm (62\")           Physical Exam   Constitutional: She is oriented to person, place, and time. She appears well-developed and well-nourished. She is active and cooperative. No distress.   HENT:   Head: Normocephalic and atraumatic.   Mouth/Throat: Oropharynx is clear and moist.   Eyes: Pupils are equal, round, and reactive to light. Conjunctivae and EOM are normal.   Neck: Normal range of motion. Neck supple. No JVD present. No tracheal deviation present. No thyromegaly present.   Cardiovascular: Normal rate, regular rhythm, normal heart sounds and intact distal pulses.    Pulmonary/Chest: Effort normal and breath sounds normal.   Abdominal: Soft. Bowel sounds are normal. She exhibits no distension. There is no tenderness.   Musculoskeletal: Normal range of motion.   Neurological: She is alert and " oriented to person, place, and time.   Skin: Skin is warm, dry and intact.   Psychiatric: She has a normal mood and affect. Her behavior is normal.   Nursing note and vitals reviewed.      Lab and Diagnostic Review:  Results for orders placed or performed during the hospital encounter of 10/17/18   Comprehensive Metabolic Panel   Result Value Ref Range    Glucose 65 (L) 70 - 100 mg/dL    BUN 12 9 - 23 mg/dL    Creatinine 1.06 0.60 - 1.30 mg/dL    Sodium 139 132 - 146 mmol/L    Potassium 3.7 3.5 - 5.5 mmol/L    Chloride 105 99 - 109 mmol/L    CO2 27.0 20.0 - 31.0 mmol/L    Calcium 9.4 8.7 - 10.4 mg/dL    Total Protein 7.3 5.7 - 8.2 g/dL    Albumin 4.70 3.20 - 4.80 g/dL    ALT (SGPT) 25 7 - 40 U/L    AST (SGOT) 26 0 - 33 U/L    Alkaline Phosphatase 103 (H) 25 - 100 U/L    Total Bilirubin 0.5 0.3 - 1.2 mg/dL    eGFR  African Amer 65 >60 mL/min/1.73    Globulin 2.6 gm/dL    A/G Ratio 1.8 1.5 - 2.5 g/dL    BUN/Creatinine Ratio 11.3 7.0 - 25.0    Anion Gap 7.0 3.0 - 11.0 mmol/L   Lipase   Result Value Ref Range    Lipase 26 6 - 51 U/L   BNP   Result Value Ref Range    BNP 17.0 0.0 - 100.0 pg/mL   CBC Auto Differential   Result Value Ref Range    WBC 9.26 3.50 - 10.80 10*3/mm3    RBC 4.65 3.89 - 5.14 10*6/mm3    Hemoglobin 13.3 11.5 - 15.5 g/dL    Hematocrit 40.2 34.5 - 44.0 %    MCV 86.5 80.0 - 99.0 fL    MCH 28.6 27.0 - 31.0 pg    MCHC 33.1 32.0 - 36.0 g/dL    RDW 13.1 11.3 - 14.5 %    RDW-SD 41.4 37.0 - 54.0 fl    MPV 9.9 6.0 - 12.0 fL    Platelets 281 150 - 450 10*3/mm3    Neutrophil % 69.1 41.0 - 71.0 %    Lymphocyte % 17.4 (L) 24.0 - 44.0 %    Monocyte % 10.4 0.0 - 12.0 %    Eosinophil % 2.9 0.0 - 3.0 %    Basophil % 0.2 0.0 - 1.0 %    Immature Grans % 0.1 0.0 - 0.6 %    Neutrophils, Absolute 6.40 1.50 - 8.30 10*3/mm3    Lymphocytes, Absolute 1.61 0.60 - 4.80 10*3/mm3    Monocytes, Absolute 0.96 0.00 - 1.00 10*3/mm3    Eosinophils, Absolute 0.27 0.00 - 0.30 10*3/mm3    Basophils, Absolute 0.02 0.00 - 0.20 10*3/mm3     Immature Grans, Absolute 0.01 0.00 - 0.03 10*3/mm3   POC Troponin, Rapid   Result Value Ref Range    Troponin I 0.00 0.00 - 0.07 ng/mL   EKG 10/17/18: NSR  Assessment and Plan:     1. Precordial chest pain  KINGSTON score 1  - Stress Test With Pet Myocardial Perfusion (Multi Study); Future    2. Shortness of breath    - Stress Test With Pet Myocardial Perfusion (Multi Study); Future    3. Controlled type 2 diabetes mellitus without complication, without long-term current use of insulin (CMS/[facility])  On metformin  - Stress Test With Pet Myocardial Perfusion (Multi Study); Future    4. Mixed hyperlipidemia  Statin therapy  - Stress Test With Pet Myocardial Perfusion (Multi Study); Future    It has been a pleasure to participate in the care of this patient.  Patient was instructed to call the [facility] with any questions, concerns, or worsening symptoms.    *Please note that portions of this note were completed with a voice recognition program. Efforts were made to edit the dictations, but occasionally words are mistranscribed.

## 2018-10-19 NOTE — TELEPHONE ENCOUNTER
Reviewed stress test results with patient.She is to follow up with her PCP for evaluation of her non cardiac causes of chest pain.

## 2019-01-14 DIAGNOSIS — E78.2 MIXED HYPERLIPIDEMIA: ICD-10-CM

## 2019-01-14 RX ORDER — ATORVASTATIN CALCIUM 20 MG/1
20 TABLET, FILM COATED ORAL DAILY
Qty: 30 TABLET | Refills: 11 | Status: SHIPPED | OUTPATIENT
Start: 2019-01-14 | End: 2020-01-23

## 2019-01-23 ENCOUNTER — OFFICE VISIT (OUTPATIENT)
Dept: ENDOCRINOLOGY | Facility: CLINIC | Age: 59
End: 2019-01-23

## 2019-01-23 VITALS
OXYGEN SATURATION: 99 % | SYSTOLIC BLOOD PRESSURE: 118 MMHG | HEART RATE: 83 BPM | BODY MASS INDEX: 37.17 KG/M2 | HEIGHT: 62 IN | WEIGHT: 202 LBS | DIASTOLIC BLOOD PRESSURE: 74 MMHG

## 2019-01-23 DIAGNOSIS — R05.9 COUGH: ICD-10-CM

## 2019-01-23 DIAGNOSIS — E11.9 CONTROLLED TYPE 2 DIABETES MELLITUS WITHOUT COMPLICATION, WITHOUT LONG-TERM CURRENT USE OF INSULIN (HCC): Primary | ICD-10-CM

## 2019-01-23 DIAGNOSIS — E78.2 MIXED HYPERLIPIDEMIA: ICD-10-CM

## 2019-01-23 LAB
GLUCOSE BLDC GLUCOMTR-MCNC: 88 MG/DL (ref 70–130)
HBA1C MFR BLD: 5.9 %

## 2019-01-23 PROCEDURE — 99213 OFFICE O/P EST LOW 20 MIN: CPT | Performed by: INTERNAL MEDICINE

## 2019-01-23 PROCEDURE — 83036 HEMOGLOBIN GLYCOSYLATED A1C: CPT | Performed by: INTERNAL MEDICINE

## 2019-01-23 PROCEDURE — 82962 GLUCOSE BLOOD TEST: CPT | Performed by: INTERNAL MEDICINE

## 2019-01-23 RX ORDER — BENZONATATE 100 MG/1
200 CAPSULE ORAL 3 TIMES DAILY PRN
Qty: 60 CAPSULE | Refills: 1 | Status: SHIPPED | OUTPATIENT
Start: 2019-01-23 | End: 2020-01-23

## 2019-08-06 ENCOUNTER — OFFICE VISIT (OUTPATIENT)
Dept: ENDOCRINOLOGY | Facility: CLINIC | Age: 59
End: 2019-08-06

## 2019-08-06 VITALS
HEIGHT: 62 IN | DIASTOLIC BLOOD PRESSURE: 98 MMHG | BODY MASS INDEX: 36.07 KG/M2 | WEIGHT: 196 LBS | OXYGEN SATURATION: 99 % | HEART RATE: 78 BPM | SYSTOLIC BLOOD PRESSURE: 130 MMHG

## 2019-08-06 DIAGNOSIS — E78.2 MIXED HYPERLIPIDEMIA: ICD-10-CM

## 2019-08-06 DIAGNOSIS — E11.9 CONTROLLED TYPE 2 DIABETES MELLITUS WITHOUT COMPLICATION, WITHOUT LONG-TERM CURRENT USE OF INSULIN (HCC): Primary | ICD-10-CM

## 2019-08-06 LAB
ALBUMIN SERPL-MCNC: 4.2 G/DL (ref 3.5–5.2)
ALBUMIN/GLOB SERPL: 1.4 G/DL
ALP SERPL-CCNC: 93 U/L (ref 39–117)
ALT SERPL W P-5'-P-CCNC: 10 U/L (ref 1–33)
ANION GAP SERPL CALCULATED.3IONS-SCNC: 11.6 MMOL/L (ref 5–15)
AST SERPL-CCNC: 15 U/L (ref 1–32)
BILIRUB SERPL-MCNC: 0.5 MG/DL (ref 0.2–1.2)
BUN BLD-MCNC: 11 MG/DL (ref 6–20)
BUN/CREAT SERPL: 11.8 (ref 7–25)
CALCIUM SPEC-SCNC: 9.7 MG/DL (ref 8.6–10.5)
CHLORIDE SERPL-SCNC: 104 MMOL/L (ref 98–107)
CHOLEST SERPL-MCNC: 131 MG/DL (ref 0–200)
CO2 SERPL-SCNC: 26.4 MMOL/L (ref 22–29)
CREAT BLD-MCNC: 0.93 MG/DL (ref 0.57–1)
DEPRECATED RDW RBC AUTO: 43.8 FL (ref 37–54)
ERYTHROCYTE [DISTWIDTH] IN BLOOD BY AUTOMATED COUNT: 12.9 % (ref 12.3–15.4)
GFR SERPL CREATININE-BSD FRML MDRD: 75 ML/MIN/1.73
GLOBULIN UR ELPH-MCNC: 2.9 GM/DL
GLUCOSE BLD-MCNC: 69 MG/DL (ref 65–99)
GLUCOSE BLDC GLUCOMTR-MCNC: 89 MG/DL (ref 70–130)
HBA1C MFR BLD: 5.5 %
HCT VFR BLD AUTO: 40 % (ref 34–46.6)
HDLC SERPL-MCNC: 45 MG/DL (ref 40–60)
HGB BLD-MCNC: 12.5 G/DL (ref 12–15.9)
LDLC SERPL CALC-MCNC: 76 MG/DL (ref 0–100)
LDLC/HDLC SERPL: 1.68 {RATIO}
MCH RBC QN AUTO: 28.9 PG (ref 26.6–33)
MCHC RBC AUTO-ENTMCNC: 31.3 G/DL (ref 31.5–35.7)
MCV RBC AUTO: 92.6 FL (ref 79–97)
PLATELET # BLD AUTO: 312 10*3/MM3 (ref 140–450)
PMV BLD AUTO: 10.1 FL (ref 6–12)
POTASSIUM BLD-SCNC: 3.9 MMOL/L (ref 3.5–5.2)
PROT SERPL-MCNC: 7.1 G/DL (ref 6–8.5)
RBC # BLD AUTO: 4.32 10*6/MM3 (ref 3.77–5.28)
SODIUM BLD-SCNC: 142 MMOL/L (ref 136–145)
TRIGL SERPL-MCNC: 52 MG/DL (ref 0–150)
VLDLC SERPL-MCNC: 10.4 MG/DL (ref 5–40)
WBC NRBC COR # BLD: 7.41 10*3/MM3 (ref 3.4–10.8)

## 2019-08-06 PROCEDURE — 82947 ASSAY GLUCOSE BLOOD QUANT: CPT | Performed by: INTERNAL MEDICINE

## 2019-08-06 PROCEDURE — 99213 OFFICE O/P EST LOW 20 MIN: CPT | Performed by: INTERNAL MEDICINE

## 2019-08-06 PROCEDURE — 85027 COMPLETE CBC AUTOMATED: CPT | Performed by: INTERNAL MEDICINE

## 2019-08-06 PROCEDURE — 83036 HEMOGLOBIN GLYCOSYLATED A1C: CPT | Performed by: INTERNAL MEDICINE

## 2019-08-06 PROCEDURE — 80061 LIPID PANEL: CPT | Performed by: INTERNAL MEDICINE

## 2019-08-06 PROCEDURE — 80053 COMPREHEN METABOLIC PANEL: CPT | Performed by: INTERNAL MEDICINE

## 2019-08-06 RX ORDER — MONTELUKAST SODIUM 10 MG/1
TABLET ORAL
COMMUNITY
Start: 2019-05-07

## 2019-08-06 NOTE — PROGRESS NOTES
"Chief Complaint   Patient presents with   • Diabetes     DM 2 f/u      HPI:   Sophie Medina is a 58 y.o.female who returns to endocrine clinic for f/u evaluation of pt's Type 2 DM. Last visit 01/23/2019. Her history is as follows:    Interim Events:  - Currently with cough/URI    1) Type 2 DM with no known associated complications at this time:  - diagnosed in 2014 during routine evaluation. Presented with c/o polyuria  - pt states she did not tolerate the ER metformin prescribed in 8/2016 - said it caused \"low blood sugars\" and \"dizziness\"    Current DM Medications:  - metformin 500 mg daily    Glucometer Review: no meter or log book for review    DM Health Maintenance:  Ophtho: Last exam 2019 - no retinopathy per pt. On plaquenil for RA  Podiatry: N/A  Monofilament / foot exam: 09/2018  Lipids: (08/2019) Tchol 131, TG 52, HDL 45, LDL 76 - on atorvastatin 20 mg daily  Urine Microalb/Cr ratio: (03/2018) 4.7 - normal  TSH: (03/2018) 1.245 - normal  CMP: (08/2019) WNL  CBC: (08/2019) WNL  Aspirin: N/A    2) mixed hyperlipidemia:  - tolerating atorvastatin 20 mg daily  Lipids: (08/2019) Tchol 131, TG 52, HDL 45, LDL 76 - on atorvastatin 20 mg daily    Other medical history: diagnosed with RA, on plaquenil      Review of Systems   Constitutional: Negative for fatigue.        Mild weight loss   HENT: Positive for congestion.    Eyes: Negative for pain, redness, itching and visual disturbance.   Respiratory: Positive for cough. Negative for shortness of breath.    Cardiovascular: Negative.  Negative for chest pain.   Gastrointestinal: Negative.    Endocrine: Negative.  Negative for polydipsia, polyphagia and polyuria.   Genitourinary: Negative.    Musculoskeletal: Positive for arthralgias, joint swelling and myalgias.   Skin: Negative.    Neurological: Negative.  Negative for dizziness and headaches.   Hematological: Negative.    Psychiatric/Behavioral: Negative.  Negative for sleep disturbance.     The following " "portions of the patient's history were reviewed and updated as appropriate: allergies, current medications, past family history, past medical history, past social history, past surgical history and problem list.    /98   Pulse 78   Ht 157.5 cm (62\")   Wt 88.9 kg (196 lb)   SpO2 99%   BMI 35.85 kg/m²   Physical Exam   Constitutional: She is oriented to person, place, and time. She appears well-developed. No distress.   Obese     HENT:   Head: Normocephalic.   Mouth/Throat: Oropharynx is clear and moist.   Eyes: Conjunctivae and EOM are normal. Pupils are equal, round, and reactive to light.   Neck: Neck supple. No thyromegaly present.   No palpable thyroid nodules     Cardiovascular: Normal rate, regular rhythm and normal heart sounds.   No murmur heard.  Pulmonary/Chest: Effort normal and breath sounds normal. She has no wheezes.   Abdominal: Soft. Bowel sounds are normal. She exhibits no mass. There is no tenderness.   Musculoskeletal: She exhibits no edema.   Lymphadenopathy:     She has no cervical adenopathy.   Neurological: She is alert and oriented to person, place, and time. No cranial nerve deficit.   Skin: Skin is warm. No rash noted. No erythema.   Acanthosis nigricans   Psychiatric: She has a normal mood and affect. Her behavior is normal.   Vitals reviewed.    LABS/IMAGING: outside records reviewed and summarized in HPI  Results for orders placed or performed in visit on 08/06/19   CBC (No Diff)   Result Value Ref Range    WBC 7.41 3.40 - 10.80 10*3/mm3    RBC 4.32 3.77 - 5.28 10*6/mm3    Hemoglobin 12.5 12.0 - 15.9 g/dL    Hematocrit 40.0 34.0 - 46.6 %    MCV 92.6 79.0 - 97.0 fL    MCH 28.9 26.6 - 33.0 pg    MCHC 31.3 (L) 31.5 - 35.7 g/dL    RDW 12.9 12.3 - 15.4 %    RDW-SD 43.8 37.0 - 54.0 fl    MPV 10.1 6.0 - 12.0 fL    Platelets 312 140 - 450 10*3/mm3   Comprehensive Metabolic Panel   Result Value Ref Range    Glucose 69 65 - 99 mg/dL    BUN 11 6 - 20 mg/dL    Creatinine 0.93 0.57 - 1.00 " mg/dL    Sodium 142 136 - 145 mmol/L    Potassium 3.9 3.5 - 5.2 mmol/L    Chloride 104 98 - 107 mmol/L    CO2 26.4 22.0 - 29.0 mmol/L    Calcium 9.7 8.6 - 10.5 mg/dL    Total Protein 7.1 6.0 - 8.5 g/dL    Albumin 4.20 3.50 - 5.20 g/dL    ALT (SGPT) 10 1 - 33 U/L    AST (SGOT) 15 1 - 32 U/L    Alkaline Phosphatase 93 39 - 117 U/L    Total Bilirubin 0.5 0.2 - 1.2 mg/dL    eGFR  African Amer 75 >60 mL/min/1.73    Globulin 2.9 gm/dL    A/G Ratio 1.4 g/dL    BUN/Creatinine Ratio 11.8 7.0 - 25.0    Anion Gap 11.6 5.0 - 15.0 mmol/L   Lipid Panel   Result Value Ref Range    Total Cholesterol 131 0 - 200 mg/dL    Triglycerides 52 0 - 150 mg/dL    HDL Cholesterol 45 40 - 60 mg/dL    LDL Cholesterol  76 0 - 100 mg/dL    VLDL Cholesterol 10.4 5 - 40 mg/dL    LDL/HDL Ratio 1.68    POC Glucose Fingerstick   Result Value Ref Range    Glucose 89 70 - 130 mg/dL   POC Glycosylated Hemoglobin (Hb A1C)   Result Value Ref Range    Hemoglobin A1C 5.5 %     ASSESSMENT/PLAN:  1) Type 2 DM with no associated complications at this time: controlled  - Hgb A1C% 5.5 today, down from 6.5 in 09/2017  - has been taking metformin to 500 mg daily. Will have pt hold metformin at this time and monitor for change  - counseled pt on carb consistent meal planning    DM health maintenance labs checked today and reviewed    2) mixed hyperlipidemia: controlled  - lipid panel checked today: LDL at goal of < 100  - Counseled pt that a statin is recommended for primary prevention of CVD  - continue atorvastatin 20 mg daily     RTC 6 months

## 2019-08-19 ENCOUNTER — TELEPHONE (OUTPATIENT)
Dept: INTERNAL MEDICINE | Facility: CLINIC | Age: 59
End: 2019-08-19

## 2019-08-19 NOTE — TELEPHONE ENCOUNTER
MIRELA GEORGE IS TRYING TO GET INTO PATIENTS MOST RECENT LAB WORK SINCE PATIENT IS GOING INTO SURGERY TOMORROW, BUT SHE IS NOT ABLE TO. PLEASE CONTACT HER AT:    NUMBER: [phone]     FAX: [phone]

## 2019-10-25 DIAGNOSIS — E11.9 CONTROLLED TYPE 2 DIABETES MELLITUS WITHOUT COMPLICATION, WITHOUT LONG-TERM CURRENT USE OF INSULIN (HCC): ICD-10-CM

## 2020-01-23 DIAGNOSIS — E78.2 MIXED HYPERLIPIDEMIA: ICD-10-CM

## 2020-01-23 RX ORDER — ATORVASTATIN CALCIUM 20 MG/1
20 TABLET, FILM COATED ORAL DAILY
Qty: 30 TABLET | Refills: 1 | Status: SHIPPED | OUTPATIENT
Start: 2020-01-23 | End: 2020-02-10 | Stop reason: SDUPTHER

## 2020-02-10 ENCOUNTER — OFFICE VISIT (OUTPATIENT)
Dept: ENDOCRINOLOGY | Facility: CLINIC | Age: 60
End: 2020-02-10

## 2020-02-10 VITALS
BODY MASS INDEX: 37.8 KG/M2 | OXYGEN SATURATION: 98 % | DIASTOLIC BLOOD PRESSURE: 80 MMHG | SYSTOLIC BLOOD PRESSURE: 118 MMHG | RESPIRATION RATE: 18 BRPM | HEART RATE: 82 BPM | TEMPERATURE: 99.3 F | WEIGHT: 205.4 LBS | HEIGHT: 62 IN

## 2020-02-10 DIAGNOSIS — E78.2 MIXED HYPERLIPIDEMIA: ICD-10-CM

## 2020-02-10 DIAGNOSIS — E11.9 CONTROLLED TYPE 2 DIABETES MELLITUS WITHOUT COMPLICATION, WITHOUT LONG-TERM CURRENT USE OF INSULIN (HCC): Primary | ICD-10-CM

## 2020-02-10 LAB
EXPIRATION DATE: NORMAL
EXPIRATION DATE: NORMAL
GLUCOSE BLDC GLUCOMTR-MCNC: 96 MG/DL (ref 70–130)
HBA1C MFR BLD: 6.5 %
Lab: NORMAL
Lab: NORMAL

## 2020-02-10 PROCEDURE — 82947 ASSAY GLUCOSE BLOOD QUANT: CPT | Performed by: INTERNAL MEDICINE

## 2020-02-10 PROCEDURE — 83036 HEMOGLOBIN GLYCOSYLATED A1C: CPT | Performed by: INTERNAL MEDICINE

## 2020-02-10 PROCEDURE — 99213 OFFICE O/P EST LOW 20 MIN: CPT | Performed by: INTERNAL MEDICINE

## 2020-02-10 RX ORDER — ATORVASTATIN CALCIUM 20 MG/1
20 TABLET, FILM COATED ORAL DAILY
Qty: 90 TABLET | Refills: 3 | Status: SHIPPED | OUTPATIENT
Start: 2020-02-10 | End: 2021-02-10

## 2020-02-10 RX ORDER — ESTRADIOL 0.1 MG/D
FILM, EXTENDED RELEASE TRANSDERMAL
COMMUNITY
Start: 2019-11-21

## 2020-02-10 RX ORDER — CYCLOBENZAPRINE HCL 10 MG
TABLET ORAL
COMMUNITY
Start: 2019-11-22 | End: 2022-12-05

## 2020-02-10 NOTE — PROGRESS NOTES
"Chief Complaint   Patient presents with   • Diabetes     6 month follow up      HPI:   Sophie Medina is a 59 y.o.female who returns to endocrine clinic for f/u evaluation of pt's Type 2 DM. Last visit 08/06/2019. Her history is as follows:    Interim Events:  - Lost front tooth. Has appt with dentist tomorrow.  - Reports that she has been eating a lot of candy since last visit. Has gained 10 lbs since last visit.    1) Type 2 DM with no known associated complications at this time:  - diagnosed in 2014 during routine evaluation. Presented with c/o polyuria  - pt states she did not tolerate the ER metformin prescribed in 8/2016 - said it caused \"low blood sugars\" and \"dizziness\"  - was on low dose metformin, Had pt go off medication in 08/2019 to see if she could manage with dietary changes alone.     Current DM Medications:  - none    Glucometer Review: no meter or log book for review    DM Health Maintenance:  Ophtho: Last exam 2019 - no retinopathy per pt. On plaquenil for RA  Podiatry: N/A  Monofilament / foot exam: 09/2018  Lipids: (08/2019) Tchol 131, TG 52, HDL 45, LDL 76 - on atorvastatin 20 mg daily  Urine Microalb/Cr ratio: (03/2018) 4.7 - normal  TSH: (03/2018) 1.245 - normal  CMP: (08/2019) WNL  CBC: (08/2019) WNL  Aspirin: N/A    2) mixed hyperlipidemia:  - tolerating atorvastatin 20 mg daily  Lipids: (08/2019) Tchol 131, TG 52, HDL 45, LDL 76 - on atorvastatin 20 mg daily    Other medical history: diagnosed with RA, on plaquenil      Review of Systems   Constitutional: Negative for fatigue.        10 lbs wt gain   HENT: Negative.  Negative for congestion.    Eyes: Negative for pain, redness, itching and visual disturbance.   Respiratory: Negative.  Negative for cough and shortness of breath.    Cardiovascular: Negative.  Negative for chest pain.   Gastrointestinal: Negative.    Endocrine: Negative.  Negative for polydipsia, polyphagia and polyuria.   Genitourinary: Negative.    Musculoskeletal: " "Positive for arthralgias and joint swelling. Negative for myalgias.   Skin: Negative.    Neurological: Negative.  Negative for dizziness and headaches.   Hematological: Negative.    Psychiatric/Behavioral: Negative.  Negative for sleep disturbance.     The following portions of the patient's history were reviewed and updated as appropriate: allergies, current medications, past family history, past medical history, past social history, past surgical history and problem list.    /80   Pulse 82   Temp 99.3 °F (37.4 °C) (Temporal)   Resp 18   Ht 157.5 cm (62\")   Wt 93.2 kg (205 lb 6.4 oz)   SpO2 98%   BMI 37.57 kg/m²   Physical Exam   Constitutional: She is oriented to person, place, and time. She appears well-developed. No distress.   Overweight     HENT:   Head: Normocephalic.   Mouth/Throat: Oropharynx is clear and moist.   Eyes: Pupils are equal, round, and reactive to light. Conjunctivae and EOM are normal.   Neck: Neck supple. No thyromegaly present.   No palpable thyroid nodules     Cardiovascular: Normal rate, regular rhythm and normal heart sounds.   No murmur heard.  Pulmonary/Chest: Effort normal and breath sounds normal. She has no wheezes.   Abdominal: Soft. Bowel sounds are normal. She exhibits no mass. There is no tenderness.   Musculoskeletal: She exhibits no edema.   Lymphadenopathy:     She has no cervical adenopathy.   Neurological: She is alert and oriented to person, place, and time. No cranial nerve deficit.   Skin: Skin is warm. No rash noted. No erythema.   Acanthosis nigricans   Psychiatric: She has a normal mood and affect. Her behavior is normal.   Vitals reviewed.    LABS/IMAGING: outside records reviewed and summarized in HPI  Results for orders placed or performed in visit on 02/10/20   POCT Glucose   Result Value Ref Range    Glucose 96 70 - 130 mg/dL    Lot Number [id number]     Expiration Date 07/02/2020    POC Glycosylated Hemoglobin (Hb A1C)   Result Value Ref Range    " Hemoglobin A1C 6.5 %    Lot Number [id number]     Expiration Date 11/10/2021      ASSESSMENT/PLAN:  1) Type 2 DM with no associated complications at this time: controlled, however, her A1C% is 6.5% today and was 5.5% last visit.  - Reviewed treatment options.   - Restart metformin 500 mg daily.  - counseled pt on carb consistent meal planning    DM health maintenance labs checked 08/2019 reviewed    2) mixed hyperlipidemia: controlled  - lipid panel checked 08/2019: LDL at goal of < 100  - Counseled pt that a statin is recommended for primary prevention of CVD  - continue atorvastatin 20 mg daily     RTC 6 months    Counseling was given to patient for the following topics:  instructions for management and risks and benefits of treatment options, see details in assessment/plan. Total face to face time of the encounter was 15 minutes and 10 minutes was spent counseling.

## 2020-08-10 ENCOUNTER — LAB (OUTPATIENT)
Dept: LAB | Facility: HOSPITAL | Age: 60
End: 2020-08-10

## 2020-08-10 ENCOUNTER — OFFICE VISIT (OUTPATIENT)
Dept: ENDOCRINOLOGY | Facility: CLINIC | Age: 60
End: 2020-08-10

## 2020-08-10 VITALS
BODY MASS INDEX: 36.1 KG/M2 | HEART RATE: 83 BPM | WEIGHT: 196.2 LBS | SYSTOLIC BLOOD PRESSURE: 122 MMHG | RESPIRATION RATE: 18 BRPM | OXYGEN SATURATION: 97 % | DIASTOLIC BLOOD PRESSURE: 74 MMHG | HEIGHT: 62 IN

## 2020-08-10 DIAGNOSIS — E78.2 MIXED HYPERLIPIDEMIA: ICD-10-CM

## 2020-08-10 DIAGNOSIS — E11.9 CONTROLLED TYPE 2 DIABETES MELLITUS WITHOUT COMPLICATION, WITHOUT LONG-TERM CURRENT USE OF INSULIN (HCC): Primary | ICD-10-CM

## 2020-08-10 LAB
EXPIRATION DATE: NORMAL
EXPIRATION DATE: NORMAL
GLUCOSE BLDC GLUCOMTR-MCNC: 118 MG/DL (ref 70–130)
HBA1C MFR BLD: 5.6 %
Lab: NORMAL
Lab: NORMAL

## 2020-08-10 PROCEDURE — 80061 LIPID PANEL: CPT | Performed by: INTERNAL MEDICINE

## 2020-08-10 PROCEDURE — 85027 COMPLETE CBC AUTOMATED: CPT | Performed by: INTERNAL MEDICINE

## 2020-08-10 PROCEDURE — 82570 ASSAY OF URINE CREATININE: CPT | Performed by: INTERNAL MEDICINE

## 2020-08-10 PROCEDURE — 99213 OFFICE O/P EST LOW 20 MIN: CPT | Performed by: INTERNAL MEDICINE

## 2020-08-10 PROCEDURE — 82947 ASSAY GLUCOSE BLOOD QUANT: CPT | Performed by: INTERNAL MEDICINE

## 2020-08-10 PROCEDURE — 83036 HEMOGLOBIN GLYCOSYLATED A1C: CPT | Performed by: INTERNAL MEDICINE

## 2020-08-10 PROCEDURE — 80053 COMPREHEN METABOLIC PANEL: CPT | Performed by: INTERNAL MEDICINE

## 2020-08-10 PROCEDURE — 82043 UR ALBUMIN QUANTITATIVE: CPT | Performed by: INTERNAL MEDICINE

## 2020-08-10 NOTE — PROGRESS NOTES
"Chief Complaint   Patient presents with   • Diabetes     6 month F/U      HPI:   Sophie Medina is a 59 y.o.female who returns to endocrine clinic for f/u evaluation of pt's Type 2 DM. Last visit 02/10/2020. Her history is as follows:    Interim Events:  - pt has lost 10 lbs since last visit through dietary changes    1) Type 2 DM with no known associated complications at this time:  - diagnosed in 2014 during routine evaluation. Presented with c/o polyuria  - pt states she did not tolerate the ER metformin prescribed in 8/2016 - said it caused \"low blood sugars\" and \"dizziness\"  - was on low dose metformin, Had pt go off medication in 08/2019 to see if she could manage with dietary changes alone.     Current DM Medications:  - metformin 500 mg daily    Glucometer Review: no meter or log book for review    DM Health Maintenance:  Ophtho: Last exam 2019 - no retinopathy per pt. On plaquenil for RA  Podiatry: N/A  Monofilament / foot exam: 09/2018  Lipids: (08/2020) Tchol 130, TG 58, HDL 46, LDL 72 - on atorvastatin 20 mg daily  Urine Microalb/Cr ratio: (08/2020) 8.6 - normal  TSH: (03/2018) 1.245 - normal  CMP: (08/2020) WNL  CBC: (08/2020) WNL  Aspirin: N/A    2) mixed hyperlipidemia:  - tolerating atorvastatin 20 mg daily  Lipids: (08/2020) Tchol 130, TG 58, HDL 46, LDL 72 - on atorvastatin 20 mg daily    Other medical history: diagnosed with RA, on plaquenil      Review of Systems   Constitutional: Negative for fatigue.        10 lbs wt loss   HENT: Negative.  Negative for congestion.    Eyes: Negative for pain, redness, itching and visual disturbance.   Respiratory: Negative.  Negative for cough and shortness of breath.    Cardiovascular: Negative.  Negative for chest pain.   Gastrointestinal: Negative.    Endocrine: Negative.  Negative for polydipsia, polyphagia and polyuria.   Genitourinary: Negative.    Musculoskeletal: Positive for arthralgias and joint swelling. Negative for myalgias.   Skin: Negative.  " "  Neurological: Negative.  Negative for dizziness and headaches.   Hematological: Negative.    Psychiatric/Behavioral: Negative.  Negative for sleep disturbance.     The following portions of the patient's history were reviewed and updated as appropriate: allergies, current medications, past family history, past medical history, past social history, past surgical history and problem list.    /74   Pulse 83   Resp 18   Ht 157.5 cm (62\")   Wt 89 kg (196 lb 3.2 oz)   SpO2 97%   BMI 35.89 kg/m²   Physical Exam   Constitutional: She is oriented to person, place, and time. She appears well-developed. No distress.   Overweight     HENT:   Head: Normocephalic.   Mouth/Throat: Oropharynx is clear and moist.   Eyes: Pupils are equal, round, and reactive to light. Conjunctivae and EOM are normal.   Neck: Neck supple. No thyromegaly present.   No palpable thyroid nodules     Cardiovascular: Normal rate, regular rhythm and normal heart sounds.   No murmur heard.  Pulmonary/Chest: Effort normal and breath sounds normal. She has no wheezes.   Abdominal: Soft. Bowel sounds are normal. She exhibits no mass. There is no tenderness.   Musculoskeletal: She exhibits no edema.   Lymphadenopathy:     She has no cervical adenopathy.   Neurological: She is alert and oriented to person, place, and time. No cranial nerve deficit.   Skin: Skin is warm. No rash noted. No erythema.   Acanthosis nigricans   Psychiatric: She has a normal mood and affect. Her behavior is normal.   Vitals reviewed.    LABS/IMAGING: outside records reviewed and summarized in HPI  Results for orders placed or performed in visit on 08/10/20   POC Glucose   Result Value Ref Range    Glucose 118 70 - 130 mg/dL    Lot Number [id number]     Expiration Date 12/28/2020    POC Glycosylated Hemoglobin (Hb A1C)   Result Value Ref Range    Hemoglobin A1C 5.6 %    Lot Number [id number]     Expiration Date 03/13/2022      ASSESSMENT/PLAN:  1) Type 2 DM with no " associated complications at this time: controlled, her A1C% is 5.6% today and was 6.5% last visit.  - Reviewed treatment options.   - continue metformin 500 mg daily.  - counseled pt on carb consistent meal planning    DM health maintenance labs checked today and reviewed    2) mixed hyperlipidemia: controlled  Lipids: (08/2020) Tchol 130, TG 58, HDL 46, LDL 72 - on atorvastatin 20 mg daily  - Counseled pt that a statin is recommended for primary prevention of CVD  - continue atorvastatin 20 mg daily     RTC 6 months    Counseling was given to patient for the following topics:  instructions for management and risks and benefits of treatment options, see details in assessment/plan. Total face to face time of the encounter was 15 minutes and 10 minutes was spent counseling.    Signed: Nicki Ayala MD

## 2020-08-11 LAB
ALBUMIN SERPL-MCNC: 4.1 G/DL (ref 3.5–5.2)
ALBUMIN UR-MCNC: 1.4 MG/DL
ALBUMIN/GLOB SERPL: 1.4 G/DL
ALP SERPL-CCNC: 90 U/L (ref 39–117)
ALT SERPL W P-5'-P-CCNC: 19 U/L (ref 1–33)
ANION GAP SERPL CALCULATED.3IONS-SCNC: 10.1 MMOL/L (ref 5–15)
AST SERPL-CCNC: 21 U/L (ref 1–32)
BILIRUB SERPL-MCNC: 0.5 MG/DL (ref 0–1.2)
BUN SERPL-MCNC: 11 MG/DL (ref 6–20)
BUN/CREAT SERPL: 12.2 (ref 7–25)
CALCIUM SPEC-SCNC: 9.4 MG/DL (ref 8.6–10.5)
CHLORIDE SERPL-SCNC: 105 MMOL/L (ref 98–107)
CHOLEST SERPL-MCNC: 130 MG/DL (ref 0–200)
CO2 SERPL-SCNC: 26.9 MMOL/L (ref 22–29)
CREAT SERPL-MCNC: 0.9 MG/DL (ref 0.57–1)
CREAT UR-MCNC: 162.4 MG/DL
DEPRECATED RDW RBC AUTO: 39.5 FL (ref 37–54)
ERYTHROCYTE [DISTWIDTH] IN BLOOD BY AUTOMATED COUNT: 12.5 % (ref 12.3–15.4)
GFR SERPL CREATININE-BSD FRML MDRD: 78 ML/MIN/1.73
GLOBULIN UR ELPH-MCNC: 2.9 GM/DL
GLUCOSE SERPL-MCNC: 80 MG/DL (ref 65–99)
HCT VFR BLD AUTO: 37.3 % (ref 34–46.6)
HDLC SERPL-MCNC: 46 MG/DL (ref 40–60)
HGB BLD-MCNC: 12.6 G/DL (ref 12–15.9)
LDLC SERPL CALC-MCNC: 72 MG/DL (ref 0–100)
LDLC/HDLC SERPL: 1.57 {RATIO}
MCH RBC QN AUTO: 29.4 PG (ref 26.6–33)
MCHC RBC AUTO-ENTMCNC: 33.8 G/DL (ref 31.5–35.7)
MCV RBC AUTO: 87.1 FL (ref 79–97)
MICROALBUMIN/CREAT UR: 8.6 MG/G
PLATELET # BLD AUTO: 276 10*3/MM3 (ref 140–450)
PMV BLD AUTO: 10.4 FL (ref 6–12)
POTASSIUM SERPL-SCNC: 4 MMOL/L (ref 3.5–5.2)
PROT SERPL-MCNC: 7 G/DL (ref 6–8.5)
RBC # BLD AUTO: 4.28 10*6/MM3 (ref 3.77–5.28)
SODIUM SERPL-SCNC: 142 MMOL/L (ref 136–145)
TRIGL SERPL-MCNC: 58 MG/DL (ref 0–150)
VLDLC SERPL-MCNC: 11.6 MG/DL (ref 5–40)
WBC # BLD AUTO: 7.65 10*3/MM3 (ref 3.4–10.8)

## 2021-02-10 DIAGNOSIS — E78.2 MIXED HYPERLIPIDEMIA: ICD-10-CM

## 2021-02-10 DIAGNOSIS — E11.9 CONTROLLED TYPE 2 DIABETES MELLITUS WITHOUT COMPLICATION, WITHOUT LONG-TERM CURRENT USE OF INSULIN (HCC): ICD-10-CM

## 2021-02-10 RX ORDER — ATORVASTATIN CALCIUM 20 MG/1
20 TABLET, FILM COATED ORAL DAILY
Qty: 90 TABLET | Refills: 3 | Status: SHIPPED | OUTPATIENT
Start: 2021-02-10 | End: 2022-02-07

## 2021-03-17 ENCOUNTER — OFFICE VISIT (OUTPATIENT)
Dept: ENDOCRINOLOGY | Facility: CLINIC | Age: 61
End: 2021-03-17

## 2021-03-17 VITALS
OXYGEN SATURATION: 100 % | SYSTOLIC BLOOD PRESSURE: 138 MMHG | WEIGHT: 199 LBS | HEART RATE: 72 BPM | BODY MASS INDEX: 36.62 KG/M2 | DIASTOLIC BLOOD PRESSURE: 80 MMHG | HEIGHT: 62 IN

## 2021-03-17 DIAGNOSIS — E78.2 MIXED HYPERLIPIDEMIA: ICD-10-CM

## 2021-03-17 DIAGNOSIS — E11.9 CONTROLLED TYPE 2 DIABETES MELLITUS WITHOUT COMPLICATION, WITHOUT LONG-TERM CURRENT USE OF INSULIN (HCC): Primary | ICD-10-CM

## 2021-03-17 LAB
EXPIRATION DATE: NORMAL
GLUCOSE BLDC GLUCOMTR-MCNC: 73 MG/DL (ref 70–130)
HBA1C MFR BLD: 5.9 %
Lab: NORMAL

## 2021-03-17 PROCEDURE — 82947 ASSAY GLUCOSE BLOOD QUANT: CPT | Performed by: INTERNAL MEDICINE

## 2021-03-17 PROCEDURE — 99213 OFFICE O/P EST LOW 20 MIN: CPT | Performed by: INTERNAL MEDICINE

## 2021-03-17 PROCEDURE — 83036 HEMOGLOBIN GLYCOSYLATED A1C: CPT | Performed by: INTERNAL MEDICINE

## 2021-03-17 RX ORDER — AMINO ACIDS/CHROMIUM
TABLET ORAL DAILY
COMMUNITY
End: 2022-05-31

## 2021-03-17 RX ORDER — CALCIUM CARBONATE/VITAMIN D3 600 MG-20
TABLET ORAL
COMMUNITY

## 2021-03-17 RX ORDER — MULTIVIT WITH MINERALS/LUTEIN
1000 TABLET ORAL DAILY
COMMUNITY

## 2021-03-17 NOTE — PROGRESS NOTES
"Chief Complaint   Patient presents with   • Diabetes     follow up     HPI:   Sophie Medina is a 60 y.o.female who returns to endocrine clinic for f/u evaluation of pt's Type 2 DM. Last visit 08/10/2020. Her history is as follows:    Interim Events:  - pt has maintained her wt loss through dietary changes    1) Type 2 DM with no known associated complications at this time:  - diagnosed in 2014 during routine evaluation. Presented with c/o polyuria  - pt states she did not tolerate the ER metformin prescribed in 8/2016 - said it caused \"low blood sugars\" and \"dizziness\"  - was on low dose metformin, Had pt go off medication in 08/2019 to see if she could manage with dietary changes alone.     Current DM Medications:  - metformin 500 mg daily    Glucometer Review: no meter or log book for review    DM Health Maintenance:  Ophtho: Last exam 2019 - no retinopathy per pt. On plaquenil for RA  Podiatry: N/A  Monofilament / foot exam: 09/2018: DUE  Lipids: (08/2020) Tchol 130, TG 58, HDL 46, LDL 72 - on atorvastatin 20 mg daily  Urine Microalb/Cr ratio: (08/2020) 8.6 - normal  TSH: (03/2018) 1.245 - normal  CMP: (08/2020) WNL  CBC: (08/2020) WNL  Aspirin: N/A    2) mixed hyperlipidemia:  - tolerating atorvastatin 20 mg daily  Lipids: (08/2020) Tchol 130, TG 58, HDL 46, LDL 72 - on atorvastatin 20 mg daily    Other medical history: diagnosed with RA, on plaquenil      Review of Systems   Constitutional: Negative for fatigue.        10 lbs wt loss   HENT: Negative.  Negative for congestion.    Eyes: Negative for pain, redness, itching and visual disturbance.   Respiratory: Negative.  Negative for cough and shortness of breath.    Cardiovascular: Negative.  Negative for chest pain.   Gastrointestinal: Negative.    Endocrine: Negative.  Negative for polydipsia, polyphagia and polyuria.   Genitourinary: Negative.    Musculoskeletal: Positive for arthralgias and joint swelling. Negative for myalgias.   Skin: Negative.  " "  Neurological: Negative.  Negative for dizziness and headaches.   Hematological: Negative.    Psychiatric/Behavioral: Negative.  Negative for sleep disturbance.       The following portions of the patient's history were reviewed and updated as appropriate: allergies, current medications, past family history, past medical history, past social history, past surgical history and problem list.    /80   Pulse 72   Ht 157.5 cm (62\")   Wt 90.3 kg (199 lb)   SpO2 100%   BMI 36.40 kg/m²   Physical Exam   Constitutional: She is oriented to person, place, and time. She appears well-developed. No distress.   Overweight     HENT:   Head: Normocephalic.   Eyes: Pupils are equal, round, and reactive to light. Conjunctivae are normal.   Neck: No thyromegaly present.   No palpable thyroid nodules     Cardiovascular: Normal rate, regular rhythm and normal heart sounds.   No murmur heard.  Pulmonary/Chest: Effort normal and breath sounds normal. She has no wheezes.   Abdominal: Soft. Bowel sounds are normal. She exhibits no mass. There is no abdominal tenderness.   Lymphadenopathy:     She has no cervical adenopathy.   Neurological: She is alert and oriented to person, place, and time. No cranial nerve deficit.   Skin: Skin is warm. No rash noted. No erythema.   Acanthosis nigricans   Psychiatric: Her behavior is normal.   Vitals reviewed.    LABS/IMAGING: outside records reviewed and summarized in HPI  Results for orders placed or performed in visit on 03/17/21   POC Glucose, Blood    Specimen: Blood   Result Value Ref Range    Glucose 73 70 - 130 mg/dL    Lot Number [id number]     Expiration Date 10/12/2021    POC Glycosylated Hemoglobin (Hb A1C)    Specimen: Blood   Result Value Ref Range    Hemoglobin A1C 5.9 %     ASSESSMENT/PLAN:  1) Type 2 DM with no associated complications at this time: controlled, her A1C% is 5.9% today and was 5.6% last visit.  - Reviewed treatment options.   - continue metformin 500 mg daily.  - " counseled pt on carb consistent meal planning    DM health maintenance labs checked 08/2020 reviewed  Labs next visit    2) mixed hyperlipidemia: controlled  Lipids: (08/2020) Tchol 130, TG 58, HDL 46, LDL 72 - on atorvastatin 20 mg daily  - Counseled pt that a statin is recommended for primary prevention of CVD  - continue atorvastatin 20 mg daily     RTC 6 months    Counseling was given to patient for the following topics:  instructions for management and risks and benefits of treatment options, see details in assessment/plan. Total face to face time of the encounter was 15 minutes and 10 minutes was spent counseling.    Signed: Nicki Ayala MD

## 2021-08-07 ENCOUNTER — HOSPITAL ENCOUNTER (EMERGENCY)
Facility: HOSPITAL | Age: 61
Discharge: HOME | End: 2021-08-07
Attending: EMERGENCY MEDICINE | Admitting: EMERGENCY MEDICINE

## 2021-08-07 ENCOUNTER — APPOINTMENT (OUTPATIENT)
Dept: CT IMAGING | Facility: HOSPITAL | Age: 61
End: 2021-08-07

## 2021-08-07 VITALS
TEMPERATURE: 98.7 F | OXYGEN SATURATION: 95 % | BODY MASS INDEX: 35.33 KG/M2 | RESPIRATION RATE: 16 BRPM | HEART RATE: 99 BPM | WEIGHT: 192 LBS | HEIGHT: 62 IN | DIASTOLIC BLOOD PRESSURE: 92 MMHG | SYSTOLIC BLOOD PRESSURE: 146 MMHG

## 2021-08-07 DIAGNOSIS — R51.9 ACUTE NONINTRACTABLE HEADACHE, UNSPECIFIED HEADACHE TYPE: Primary | ICD-10-CM

## 2021-08-07 LAB
ALBUMIN SERPL-MCNC: 4.1 G/DL (ref 3.5–5.2)
ALBUMIN/GLOB SERPL: 1.3 G/DL
ALP SERPL-CCNC: 92 U/L (ref 39–117)
ALT SERPL W P-5'-P-CCNC: 20 U/L (ref 1–33)
ANION GAP SERPL CALCULATED.3IONS-SCNC: 11 MMOL/L (ref 5–15)
AST SERPL-CCNC: 26 U/L (ref 1–32)
BACTERIA UR QL AUTO: ABNORMAL /HPF
BASOPHILS # BLD AUTO: 0.03 10*3/MM3 (ref 0–0.2)
BASOPHILS NFR BLD AUTO: 0.5 % (ref 0–1.5)
BILIRUB SERPL-MCNC: 0.5 MG/DL (ref 0–1.2)
BILIRUB UR QL STRIP: NEGATIVE
BUN SERPL-MCNC: 8 MG/DL (ref 6–20)
BUN/CREAT SERPL: 8.5 (ref 7–25)
CALCIUM SPEC-SCNC: 9.4 MG/DL (ref 8.6–10.5)
CHLORIDE SERPL-SCNC: 107 MMOL/L (ref 98–107)
CLARITY UR: CLEAR
CO2 SERPL-SCNC: 24 MMOL/L (ref 22–29)
COLOR UR: YELLOW
CREAT SERPL-MCNC: 0.94 MG/DL (ref 0.57–1)
D-LACTATE SERPL-SCNC: 1 MMOL/L (ref 0.5–2)
DEPRECATED RDW RBC AUTO: 41.7 FL (ref 37–54)
EOSINOPHIL # BLD AUTO: 0.19 10*3/MM3 (ref 0–0.4)
EOSINOPHIL NFR BLD AUTO: 2.9 % (ref 0.3–6.2)
ERYTHROCYTE [DISTWIDTH] IN BLOOD BY AUTOMATED COUNT: 12.9 % (ref 12.3–15.4)
GFR SERPL CREATININE-BSD FRML MDRD: 74 ML/MIN/1.73
GLOBULIN UR ELPH-MCNC: 3.1 GM/DL
GLUCOSE SERPL-MCNC: 85 MG/DL (ref 65–99)
GLUCOSE UR STRIP-MCNC: NEGATIVE MG/DL
HCT VFR BLD AUTO: 39.7 % (ref 34–46.6)
HGB BLD-MCNC: 13.2 G/DL (ref 12–15.9)
HGB UR QL STRIP.AUTO: NEGATIVE
HOLD SPECIMEN: NORMAL
HYALINE CASTS UR QL AUTO: ABNORMAL /LPF
IMM GRANULOCYTES # BLD AUTO: 0.01 10*3/MM3 (ref 0–0.05)
IMM GRANULOCYTES NFR BLD AUTO: 0.2 % (ref 0–0.5)
KETONES UR QL STRIP: NEGATIVE
LEUKOCYTE ESTERASE UR QL STRIP.AUTO: NEGATIVE
LYMPHOCYTES # BLD AUTO: 1.31 10*3/MM3 (ref 0.7–3.1)
LYMPHOCYTES NFR BLD AUTO: 19.7 % (ref 19.6–45.3)
MCH RBC QN AUTO: 29.3 PG (ref 26.6–33)
MCHC RBC AUTO-ENTMCNC: 33.2 G/DL (ref 31.5–35.7)
MCV RBC AUTO: 88.2 FL (ref 79–97)
MONOCYTES # BLD AUTO: 0.63 10*3/MM3 (ref 0.1–0.9)
MONOCYTES NFR BLD AUTO: 9.5 % (ref 5–12)
NEUTROPHILS NFR BLD AUTO: 4.47 10*3/MM3 (ref 1.7–7)
NEUTROPHILS NFR BLD AUTO: 67.2 % (ref 42.7–76)
NITRITE UR QL STRIP: NEGATIVE
NRBC BLD AUTO-RTO: 0 /100 WBC (ref 0–0.2)
PH UR STRIP.AUTO: 6 [PH] (ref 5–8)
PLATELET # BLD AUTO: 255 10*3/MM3 (ref 140–450)
PMV BLD AUTO: 9.5 FL (ref 6–12)
POTASSIUM SERPL-SCNC: 3.8 MMOL/L (ref 3.5–5.2)
PROT SERPL-MCNC: 7.2 G/DL (ref 6–8.5)
PROT UR QL STRIP: NEGATIVE
RBC # BLD AUTO: 4.5 10*6/MM3 (ref 3.77–5.28)
RBC # UR: ABNORMAL /HPF
REF LAB TEST METHOD: ABNORMAL
SODIUM SERPL-SCNC: 142 MMOL/L (ref 136–145)
SP GR UR STRIP: >=1.03 (ref 1–1.03)
SQUAMOUS #/AREA URNS HPF: ABNORMAL /HPF
UROBILINOGEN UR QL STRIP: NORMAL
WBC # BLD AUTO: 6.64 10*3/MM3 (ref 3.4–10.8)
WBC UR QL AUTO: ABNORMAL /HPF
WHOLE BLOOD HOLD SPECIMEN: NORMAL

## 2021-08-07 PROCEDURE — 25010000002 DEXAMETHASONE PER 1 MG: Performed by: PHYSICIAN ASSISTANT

## 2021-08-07 PROCEDURE — 81003 URINALYSIS AUTO W/O SCOPE: CPT | Performed by: PHYSICIAN ASSISTANT

## 2021-08-07 PROCEDURE — 81015 MICROSCOPIC EXAM OF URINE: CPT | Performed by: PHYSICIAN ASSISTANT

## 2021-08-07 PROCEDURE — 25010000002 KETOROLAC TROMETHAMINE PER 15 MG: Performed by: PHYSICIAN ASSISTANT

## 2021-08-07 PROCEDURE — 96375 TX/PRO/DX INJ NEW DRUG ADDON: CPT

## 2021-08-07 PROCEDURE — 96374 THER/PROPH/DIAG INJ IV PUSH: CPT

## 2021-08-07 PROCEDURE — 99283 EMERGENCY DEPT VISIT LOW MDM: CPT

## 2021-08-07 PROCEDURE — 25010000002 METOCLOPRAMIDE PER 10 MG: Performed by: PHYSICIAN ASSISTANT

## 2021-08-07 PROCEDURE — 85025 COMPLETE CBC W/AUTO DIFF WBC: CPT | Performed by: EMERGENCY MEDICINE

## 2021-08-07 PROCEDURE — 83605 ASSAY OF LACTIC ACID: CPT | Performed by: PHYSICIAN ASSISTANT

## 2021-08-07 PROCEDURE — 0 IOPAMIDOL PER 1 ML: Performed by: EMERGENCY MEDICINE

## 2021-08-07 PROCEDURE — 70470 CT HEAD/BRAIN W/O & W/DYE: CPT

## 2021-08-07 PROCEDURE — 80053 COMPREHEN METABOLIC PANEL: CPT | Performed by: EMERGENCY MEDICINE

## 2021-08-07 PROCEDURE — 87086 URINE CULTURE/COLONY COUNT: CPT | Performed by: PHYSICIAN ASSISTANT

## 2021-08-07 PROCEDURE — 25010000002 DIPHENHYDRAMINE PER 50 MG: Performed by: PHYSICIAN ASSISTANT

## 2021-08-07 RX ORDER — BUTALBITAL, ACETAMINOPHEN AND CAFFEINE 50; 325; 40 MG/1; MG/1; MG/1
1-2 TABLET ORAL EVERY 6 HOURS PRN
Qty: 20 TABLET | Refills: 0 | Status: SHIPPED | OUTPATIENT
Start: 2021-08-07 | End: 2022-12-05

## 2021-08-07 RX ORDER — DEXAMETHASONE SODIUM PHOSPHATE 4 MG/ML
6 INJECTION, SOLUTION INTRA-ARTICULAR; INTRALESIONAL; INTRAMUSCULAR; INTRAVENOUS; SOFT TISSUE ONCE
Status: COMPLETED | OUTPATIENT
Start: 2021-08-07 | End: 2021-08-07

## 2021-08-07 RX ORDER — DIPHENHYDRAMINE HYDROCHLORIDE 50 MG/ML
25 INJECTION INTRAMUSCULAR; INTRAVENOUS ONCE
Status: COMPLETED | OUTPATIENT
Start: 2021-08-07 | End: 2021-08-07

## 2021-08-07 RX ORDER — SODIUM CHLORIDE 0.9 % (FLUSH) 0.9 %
10 SYRINGE (ML) INJECTION AS NEEDED
Status: DISCONTINUED | OUTPATIENT
Start: 2021-08-07 | End: 2021-08-07 | Stop reason: HOSPADM

## 2021-08-07 RX ORDER — KETOROLAC TROMETHAMINE 15 MG/ML
10 INJECTION, SOLUTION INTRAMUSCULAR; INTRAVENOUS ONCE
Status: COMPLETED | OUTPATIENT
Start: 2021-08-07 | End: 2021-08-07

## 2021-08-07 RX ORDER — METOCLOPRAMIDE HYDROCHLORIDE 5 MG/ML
10 INJECTION INTRAMUSCULAR; INTRAVENOUS ONCE
Status: COMPLETED | OUTPATIENT
Start: 2021-08-07 | End: 2021-08-07

## 2021-08-07 RX ADMIN — METOCLOPRAMIDE 10 MG: 5 INJECTION, SOLUTION INTRAMUSCULAR; INTRAVENOUS at 14:04

## 2021-08-07 RX ADMIN — IOPAMIDOL 50 ML: 755 INJECTION, SOLUTION INTRAVENOUS at 14:43

## 2021-08-07 RX ADMIN — DEXAMETHASONE SODIUM PHOSPHATE 6 MG: 4 INJECTION, SOLUTION INTRA-ARTICULAR; INTRALESIONAL; INTRAMUSCULAR; INTRAVENOUS; SOFT TISSUE at 14:04

## 2021-08-07 RX ADMIN — SODIUM CHLORIDE 1000 ML: 9 INJECTION, SOLUTION INTRAVENOUS at 14:04

## 2021-08-07 RX ADMIN — KETOROLAC TROMETHAMINE 10 MG: 15 INJECTION, SOLUTION INTRAMUSCULAR; INTRAVENOUS at 14:04

## 2021-08-07 RX ADMIN — DIPHENHYDRAMINE HYDROCHLORIDE 25 MG: 50 INJECTION INTRAMUSCULAR; INTRAVENOUS at 14:04

## 2021-08-07 NOTE — ED PROVIDER NOTES
EMERGENCY DEPARTMENT ENCOUNTER    Pt Name: Sophie Medina  MRN: [id number]  Pt :   1960  Room Number:    Date of encounter:  2021  PCP: Lizz Marinelli APRN  ED Provider: Jamey Mata PA-C    Historian: Patient      HPI:  Chief Complaint: Headache x5 days        Context: Sophie Medina is a 60 y.o. female who presents to the ED c/o onset of a headache 5 days ago.  She states she woke  morning with a mild headache that worsened throughout the day.  She was able to go to sleep after taking some Tylenol, but she states the headache returns upon awakening.  She describes the headache as pounding and bandlike, 9/10, primarily located in the frontal area but radiating into the temples and occiput.  No radiation into the neck.  No photophobia no unilateral weakness paresis or paresthesias, no nausea or vomiting.  No vision changes or visual field cuts.  No falls trauma recent infections.  She does have a history of rheumatoid arthritis and takes hydroxychloroquine.  She has been vaccinated against COVID-19.  She has a history of type 2 diabetes, controlled with her latest A1c of 6.x    She denies polydipsia or polyuria, no visual field cuts or vision changes.  She denies chest arm or jaw shoulder pain unilateral weakness paresis paresthesias or shortness of breath chest pain palpitations irregular heartbeat.  No dizziness or lightheadedness.  No syncope presyncope or orthostasis.  No lower extremity edema.      PAST MEDICAL HISTORY  Past Medical History:   Diagnosis Date   • Asthma    • DM (diabetes mellitus), type 2, uncontrolled (CMS/HCC)    • Hyperlipidemia    • Rheumatoid arthritis (CMS/HCC)          PAST SURGICAL HISTORY  Past Surgical History:   Procedure Laterality Date   • HYSTERECTOMY  2014         FAMILY HISTORY  Family History   Problem Relation Age of Onset   • Heart attack Mother 84   • Hypertension Mother    • Aneurysm Mother    • Arthritis Father    • Diabetes  Father    • Hypertension Father    • Dementia Father    • Diabetes Maternal Grandmother    • No Known Problems Maternal Grandfather    • No Known Problems Paternal Grandmother    • No Known Problems Paternal Grandfather          SOCIAL HISTORY  Social History     Socioeconomic History   • Marital status:      Spouse name: Not on file   • Number of children: Not on file   • Years of education: Not on file   • Highest education level: Not on file   Tobacco Use   • Smoking status: Former Smoker     Packs/day: 1.00     Years: 2.00     Pack years: 2.00     Quit date: 10/19/1997     Years since quittin.8   • Smokeless tobacco: Never Used   Substance and Sexual Activity   • Alcohol use: No   • Drug use: No   • Sexual activity: Defer         ALLERGIES  Patient has no known allergies.        REVIEW OF SYSTEMS  Review of Systems   Constitutional: Positive for activity change and appetite change. Negative for diaphoresis, fatigue and fever.   HENT: Negative for congestion, nosebleeds, rhinorrhea and sore throat.    Eyes: Negative for photophobia and visual disturbance.   Respiratory: Negative for cough, shortness of breath and wheezing.    Cardiovascular: Negative for chest pain, palpitations and leg swelling.   Gastrointestinal: Negative for abdominal pain, nausea and vomiting.   Endocrine: Negative for polydipsia and polyuria.   Genitourinary: Negative for dysuria, flank pain and hematuria.   Musculoskeletal: Negative for arthralgias, back pain, myalgias and neck pain.   Neurological: Positive for headaches. Negative for dizziness, tremors, seizures, syncope, facial asymmetry, speech difficulty, weakness, light-headedness and numbness.   All other systems reviewed and are negative.         All systems reviewed and negative except for those discussed in HPI.       PHYSICAL EXAM    I have reviewed the triage vital signs and nursing notes.    ED Triage Vitals [21 1257]   Temp Heart Rate Resp BP SpO2   98.7 °F  (37.1 °C) 99 16 147/88 98 %      Temp src Heart Rate Source Patient Position BP Location FiO2 (%)   Tympanic Monitor Sitting Left arm --       Physical Exam  GENERAL:   Appears to be in a significant mild discomfort, not toxic appearing and not in acute distress.  She is hemodynamically stable.    HENT: Nares patent.  No facial asymmetry.  Oropharynx is clear mucous membranes are moist airways patent and uvula is midline.  No tenderness to scalp musculature or posterior cervical/paraspinous area.  EYES: No scleral icterus.  PERRL EOMI fields are grossly full, visual acuity is grossly intact.  CV: Regular rhythm, regular rate.  RESPIRATORY: Normal effort.  No audible wheezes, rales or rhonchi.  ABDOMEN: Soft, nontender  MUSCULOSKELETAL: No deformities.   NEURO: Alert, moves all extremities, follows commands.  Cranial nerves are intact, no gross sensorimotor deficit.  Proprioception is normal.  DTRs are 2+/4+ bilaterally in the upper and lower extremities.  SKIN: Warm, dry, no rash visualized.        LAB RESULTS  Recent Results (from the past 24 hour(s))   Comprehensive Metabolic Panel    Collection Time: 08/07/21  1:37 PM    Specimen: Blood   Result Value Ref Range    Glucose 85 65 - 99 mg/dL    BUN 8 6 - 20 mg/dL    Creatinine 0.94 0.57 - 1.00 mg/dL    Sodium 142 136 - 145 mmol/L    Potassium 3.8 3.5 - 5.2 mmol/L    Chloride 107 98 - 107 mmol/L    CO2 24.0 22.0 - 29.0 mmol/L    Calcium 9.4 8.6 - 10.5 mg/dL    Total Protein 7.2 6.0 - 8.5 g/dL    Albumin 4.10 3.50 - 5.20 g/dL    ALT (SGPT) 20 1 - 33 U/L    AST (SGOT) 26 1 - 32 U/L    Alkaline Phosphatase 92 39 - 117 U/L    Total Bilirubin 0.5 0.0 - 1.2 mg/dL    eGFR  African Amer 74 >60 mL/min/1.73    Globulin 3.1 gm/dL    A/G Ratio 1.3 g/dL    BUN/Creatinine Ratio 8.5 7.0 - 25.0    Anion Gap 11.0 5.0 - 15.0 mmol/L   Green Top (Gel)    Collection Time: 08/07/21  1:37 PM   Result Value Ref Range    Extra Tube Hold for add-ons.    Lavender Top    Collection Time:  08/07/21  1:37 PM   Result Value Ref Range    Extra Tube hold for add-on    Gold Top - SST    Collection Time: 08/07/21  1:37 PM   Result Value Ref Range    Extra Tube Hold for add-ons.    Gray Top    Collection Time: 08/07/21  1:37 PM   Result Value Ref Range    Extra Tube Hold for add-ons.    CBC Auto Differential    Collection Time: 08/07/21  1:37 PM    Specimen: Blood   Result Value Ref Range    WBC 6.64 3.40 - 10.80 10*3/mm3    RBC 4.50 3.77 - 5.28 10*6/mm3    Hemoglobin 13.2 12.0 - 15.9 g/dL    Hematocrit 39.7 34.0 - 46.6 %    MCV 88.2 79.0 - 97.0 fL    MCH 29.3 26.6 - 33.0 pg    MCHC 33.2 31.5 - 35.7 g/dL    RDW 12.9 12.3 - 15.4 %    RDW-SD 41.7 37.0 - 54.0 fl    MPV 9.5 6.0 - 12.0 fL    Platelets 255 140 - 450 10*3/mm3    Neutrophil % 67.2 42.7 - 76.0 %    Lymphocyte % 19.7 19.6 - 45.3 %    Monocyte % 9.5 5.0 - 12.0 %    Eosinophil % 2.9 0.3 - 6.2 %    Basophil % 0.5 0.0 - 1.5 %    Immature Grans % 0.2 0.0 - 0.5 %    Neutrophils, Absolute 4.47 1.70 - 7.00 10*3/mm3    Lymphocytes, Absolute 1.31 0.70 - 3.10 10*3/mm3    Monocytes, Absolute 0.63 0.10 - 0.90 10*3/mm3    Eosinophils, Absolute 0.19 0.00 - 0.40 10*3/mm3    Basophils, Absolute 0.03 0.00 - 0.20 10*3/mm3    Immature Grans, Absolute 0.01 0.00 - 0.05 10*3/mm3    nRBC 0.0 0.0 - 0.2 /100 WBC   Lactic Acid, Plasma    Collection Time: 08/07/21  1:37 PM    Specimen: Blood   Result Value Ref Range    Lactate 1.0 0.5 - 2.0 mmol/L   Urinalysis With Microscopic If Indicated (No Culture) - Urine, Clean Catch    Collection Time: 08/07/21  2:09 PM    Specimen: Urine, Clean Catch   Result Value Ref Range    Color, UA Yellow Yellow, Straw    Appearance, UA Clear Clear    pH, UA 6.0 5.0 - 8.0    Specific Gravity, UA >=1.030 1.005 - 1.030    Glucose, UA Negative Negative    Ketones, UA Negative Negative    Bilirubin, UA Negative Negative    Blood, UA Negative Negative    Protein, UA Negative Negative    Leuk Esterase, UA Negative Negative    Nitrite, UA Negative  Negative    Urobilinogen, UA 0.2 E.U./dL 0.2 - 1.0 E.U./dL   Urinalysis, Microscopic Only - Urine, Clean Catch    Collection Time: 08/07/21  3:12 PM    Specimen: Urine, Clean Catch   Result Value Ref Range    RBC, UA 3-6 (A) None Seen, 0-2 /HPF    WBC, UA 3-5 (A) None Seen, 0-2 /HPF    Bacteria, UA Trace None Seen, Trace /HPF    Squamous Epithelial Cells, UA 0-2 None Seen, 0-2 /HPF    Hyaline Casts, UA 0-6 0 - 6 /LPF    Methodology Automated Microscopy        If labs were ordered, I independently reviewed the results.        RADIOLOGY  CT Head With & Without Contrast    Result Date: 8/7/2021  EXAMINATION: CT HEAD WWO CONTRAST - 08/07/2021  INDICATION: Headache x 5 days.  TECHNIQUE: 5 mm unenhanced images through the brain  The radiation dose reduction device was turned on for each scan per the ALARA (As Low as Reasonably Achievable) protocol.  COMPARISON: NONE  FINDINGS: The calvarium appears intact. Included paranasal sinuses and mastoids appear clear. Soft tissue window images show no evidence of hemorrhage contusion or edema, no evidence of mass or mass effect, acute or old infarct, hydrocephalus, or abnormal extra-axial collection. There is mild generalized cerebral atrophy, within expected limits for the patient's age.  Postcontrast images show no evidence of enhancing mass or other pathologic postcontrast enhancement.      No evidence of acute intracranial disease.  DICTATED:   08/07/2021 EDITED/ls :   08/07/2021  This report was finalized on 8/7/2021 10:28 PM by Dr. Eddie Bennett MD.      PROCEDURES    Procedures    No orders to display       MEDICATIONS GIVEN IN ER    Medications   sodium chloride 0.9 % bolus 1,000 mL (0 mL Intravenous Stopped 8/7/21 1434)   ketorolac (TORADOL) injection 10 mg (10 mg Intravenous Given 8/7/21 1404)   metoclopramide (REGLAN) injection 10 mg (10 mg Intravenous Given 8/7/21 1404)   diphenhydrAMINE (BENADRYL) injection 25 mg (25 mg Intravenous Given 8/7/21 1404)   dexamethasone  (DECADRON) injection 6 mg (6 mg Intravenous Given 8/7/21 1404)   iopamidol (ISOVUE-370) 76 % injection 50 mL (50 mL Intravenous Given 8/7/21 1443)         PROGRESS, DATA ANALYSIS, CONSULTS, AND MEDICAL DECISION MAKING    All labs have been independently reviewed by me.  All radiology studies have been reviewed by me and the radiologist dictating the report.   EKG's have been independently viewed and interpreted by me.      Differential diagnoses: Tension headache, migraine headache, intracranial pathology, fluid deficit/electrolyte abnormality, metabolic dyscrasia.      ED Course as of Aug 07 2317   Sat Aug 07, 2021   1609 TRUE query complete. Treatment plan to include limited course of prescribed  controlled substance. Risks including addiction, benefits, and alternatives presented to patient.       [MS]      ED Course User Index  [MS] Jagdish Pleitez MD       Patient remained stable throughout course of stay in emergency department.  She reports her pain at a 4/10 following administration of ketorolac, metoclopramide, diphenhydramine, Decadron and normal saline.  We will discharged home with short-term course of Fioricet, refer to neurology for further evaluation of her headaches, signs and symptoms of worsening were reviewed she was encouraged return immediately if any change or worsening of symptoms.  She verbalizes understanding and is agreeable to plan.      AS OF 23:17 EDT VITALS:    BP - 146/92  HR - 99  TEMP - 98.7 °F (37.1 °C) (Tympanic)  O2 SATS - 95%        DIAGNOSIS  Final diagnoses:   Acute nonintractable headache, unspecified headache type         DISPOSITION  DISCHARGE    Patient discharged in stable condition.    Reviewed implications of results, diagnosis, meds, responsibility to follow up, warning signs and symptoms of possible worsening, potential complications and reasons to return to ER.    Patient/Family voiced understanding of above instructions.    Discussed plan for discharge, as there  is no emergent indication for admission.  Pt/family is agreeable and understands need for follow up and possible repeat testing.  Pt/family is aware that discharge does not mean that nothing is wrong but that it indicates no emergency is currently present that requires admission and they must continue care with follow-up as given below or with a physician of their choice.     FOLLOW-UP  Lizz Marnielli APRN  [address] [facility] [id number] [phone]    In 2 days      Chago Ricci MD  [address]  [address]  [facility] [id number] [phone]    Schedule an appointment as soon as possible for a visit   For neurology consult         Medication List      New Prescriptions    butalbital-acetaminophen-caffeine -40 MG per tablet  Commonly known as: FIORICET, ESGIC  Take 1-2 tablets by mouth Every 6 (Six) Hours As Needed for Headache.           Where to Get Your Medications      These medications were sent to [id number] - [place], [place] - [address] AT [facility] [address] - [phone]  - [phone]   [address] [facility] [address]    Phone: [phone]   · butalbital-acetaminophen-caffeine -40 MG per tablet                  Jamey Mata PA-C  08/07/21 0643

## 2021-08-07 NOTE — DISCHARGE INSTRUCTIONS
No strenuous activity.  Rest, and increase your fluid intake.  Recheck with your primary care provider on Monday, and follow-up with Dr. Lomax for neurology consult.  You have been prescribed Fioricet for your headache, which can cause some drowsiness.  No driving or operating machinery if you are taking Fioricet.  Return to the emergency department immediately any change or worsening of symptoms.

## 2021-08-08 LAB — BACTERIA SPEC AEROBE CULT: NORMAL

## 2021-11-29 ENCOUNTER — LAB (OUTPATIENT)
Dept: LAB | Facility: HOSPITAL | Age: 61
End: 2021-11-29

## 2021-11-29 ENCOUNTER — OFFICE VISIT (OUTPATIENT)
Dept: ENDOCRINOLOGY | Facility: CLINIC | Age: 61
End: 2021-11-29

## 2021-11-29 VITALS
SYSTOLIC BLOOD PRESSURE: 138 MMHG | BODY MASS INDEX: 36.44 KG/M2 | HEART RATE: 85 BPM | DIASTOLIC BLOOD PRESSURE: 80 MMHG | WEIGHT: 198 LBS | OXYGEN SATURATION: 98 % | HEIGHT: 62 IN

## 2021-11-29 DIAGNOSIS — E11.9 CONTROLLED TYPE 2 DIABETES MELLITUS WITHOUT COMPLICATION, WITHOUT LONG-TERM CURRENT USE OF INSULIN (HCC): Primary | ICD-10-CM

## 2021-11-29 DIAGNOSIS — E78.2 MIXED HYPERLIPIDEMIA: ICD-10-CM

## 2021-11-29 LAB
EXPIRATION DATE: NORMAL
GLUCOSE BLDC GLUCOMTR-MCNC: 101 MG/DL (ref 70–130)
HBA1C MFR BLD: 6.1 %
Lab: NORMAL

## 2021-11-29 PROCEDURE — 82947 ASSAY GLUCOSE BLOOD QUANT: CPT | Performed by: INTERNAL MEDICINE

## 2021-11-29 PROCEDURE — 99214 OFFICE O/P EST MOD 30 MIN: CPT | Performed by: INTERNAL MEDICINE

## 2021-11-29 PROCEDURE — 80061 LIPID PANEL: CPT | Performed by: INTERNAL MEDICINE

## 2021-11-29 PROCEDURE — 82607 VITAMIN B-12: CPT | Performed by: INTERNAL MEDICINE

## 2021-11-29 PROCEDURE — 83036 HEMOGLOBIN GLYCOSYLATED A1C: CPT | Performed by: INTERNAL MEDICINE

## 2021-11-29 PROCEDURE — 82570 ASSAY OF URINE CREATININE: CPT | Performed by: INTERNAL MEDICINE

## 2021-11-29 PROCEDURE — 82043 UR ALBUMIN QUANTITATIVE: CPT | Performed by: INTERNAL MEDICINE

## 2021-11-29 PROCEDURE — 84443 ASSAY THYROID STIM HORMONE: CPT | Performed by: INTERNAL MEDICINE

## 2021-11-30 ENCOUNTER — PATIENT MESSAGE (OUTPATIENT)
Dept: ENDOCRINOLOGY | Facility: CLINIC | Age: 61
End: 2021-11-30

## 2021-11-30 DIAGNOSIS — E11.9 CONTROLLED TYPE 2 DIABETES MELLITUS WITHOUT COMPLICATION, WITHOUT LONG-TERM CURRENT USE OF INSULIN (HCC): Primary | ICD-10-CM

## 2021-11-30 LAB
ALBUMIN UR-MCNC: <1.2 MG/DL
CHOLEST SERPL-MCNC: 148 MG/DL (ref 0–200)
CREAT UR-MCNC: 122.2 MG/DL
HDLC SERPL-MCNC: 52 MG/DL (ref 40–60)
LDLC SERPL CALC-MCNC: 86 MG/DL (ref 0–100)
LDLC/HDLC SERPL: 1.67 {RATIO}
MICROALBUMIN/CREAT UR: NORMAL MG/G{CREAT}
TRIGL SERPL-MCNC: 47 MG/DL (ref 0–150)
TSH SERPL DL<=0.05 MIU/L-ACNC: 0.88 UIU/ML (ref 0.27–4.2)
VIT B12 BLD-MCNC: 1093 PG/ML (ref 211–946)
VLDLC SERPL-MCNC: 10 MG/DL (ref 5–40)

## 2021-11-30 RX ORDER — LANCETS 28 GAUGE
1 EACH MISCELLANEOUS DAILY
Qty: 100 EACH | Refills: 3 | Status: SHIPPED | OUTPATIENT
Start: 2021-11-30 | End: 2021-12-09 | Stop reason: CLARIF

## 2021-11-30 RX ORDER — BLOOD-GLUCOSE METER
1 KIT MISCELLANEOUS DAILY
Qty: 100 EACH | Refills: 3 | Status: SHIPPED | OUTPATIENT
Start: 2021-11-30 | End: 2021-12-09 | Stop reason: CLARIF

## 2021-12-09 RX ORDER — LANCETS 33 GAUGE
1 EACH MISCELLANEOUS DAILY
Qty: 50 EACH | Refills: 5 | Status: SHIPPED | OUTPATIENT
Start: 2021-12-09

## 2021-12-09 NOTE — TELEPHONE ENCOUNTER
From: Sophie Medina  To: Nicki Ayala MD  Sent: 11/30/2021 10:19 PM EST  Subject: Question regarding VITAMIN B12    Why is this so high? Is this something I need to be concerned about? How do I get it down?   intraventricular extension of intraparenchymal hemorrhage

## 2022-02-05 DIAGNOSIS — E11.9 CONTROLLED TYPE 2 DIABETES MELLITUS WITHOUT COMPLICATION, WITHOUT LONG-TERM CURRENT USE OF INSULIN: ICD-10-CM

## 2022-02-05 DIAGNOSIS — E78.2 MIXED HYPERLIPIDEMIA: ICD-10-CM

## 2022-02-07 RX ORDER — ATORVASTATIN CALCIUM 20 MG/1
20 TABLET, FILM COATED ORAL DAILY
Qty: 90 TABLET | Refills: 3 | Status: SHIPPED | OUTPATIENT
Start: 2022-02-07

## 2022-04-10 ENCOUNTER — HOSPITAL ENCOUNTER (EMERGENCY)
Facility: HOSPITAL | Age: 62
Discharge: HOME | End: 2022-04-10
Attending: EMERGENCY MEDICINE | Admitting: EMERGENCY MEDICINE

## 2022-04-10 ENCOUNTER — APPOINTMENT (OUTPATIENT)
Dept: CARDIOLOGY | Facility: HOSPITAL | Age: 62
End: 2022-04-10

## 2022-04-10 VITALS
HEART RATE: 99 BPM | SYSTOLIC BLOOD PRESSURE: 138 MMHG | TEMPERATURE: 98.5 F | OXYGEN SATURATION: 95 % | HEIGHT: 62 IN | BODY MASS INDEX: 36.62 KG/M2 | WEIGHT: 199 LBS | DIASTOLIC BLOOD PRESSURE: 94 MMHG | RESPIRATION RATE: 22 BRPM

## 2022-04-10 DIAGNOSIS — M79.89 PAIN AND SWELLING OF RIGHT LOWER LEG: ICD-10-CM

## 2022-04-10 DIAGNOSIS — N28.9 RENAL INSUFFICIENCY: ICD-10-CM

## 2022-04-10 DIAGNOSIS — Z86.39 HISTORY OF DIABETES MELLITUS: ICD-10-CM

## 2022-04-10 DIAGNOSIS — L03.115 CELLULITIS OF RIGHT LEG: Primary | ICD-10-CM

## 2022-04-10 DIAGNOSIS — Z87.39 HISTORY OF RHEUMATOID ARTHRITIS: ICD-10-CM

## 2022-04-10 DIAGNOSIS — M79.661 PAIN AND SWELLING OF RIGHT LOWER LEG: ICD-10-CM

## 2022-04-10 LAB
ALBUMIN SERPL-MCNC: 5 G/DL (ref 3.5–5.2)
ALBUMIN/GLOB SERPL: 1.6 G/DL
ALP SERPL-CCNC: 119 U/L (ref 39–117)
ALT SERPL W P-5'-P-CCNC: 20 U/L (ref 1–33)
ANION GAP SERPL CALCULATED.3IONS-SCNC: 12 MMOL/L (ref 5–15)
AST SERPL-CCNC: 27 U/L (ref 1–32)
BASOPHILS # BLD AUTO: 0.01 10*3/MM3 (ref 0–0.2)
BASOPHILS NFR BLD AUTO: 0.1 % (ref 0–1.5)
BH CV ECHO MEAS - BSA(HAYCOCK): 2 M^2
BH CV ECHO MEAS - BSA: 1.9 M^2
BH CV ECHO MEAS - BZI_BMI: 36.4 KILOGRAMS/M^2
BH CV ECHO MEAS - BZI_METRIC_HEIGHT: 157.5 CM
BH CV ECHO MEAS - BZI_METRIC_WEIGHT: 90.3 KG
BH CV LOWER VASCULAR LEFT COMMON FEMORAL AUGMENT: NORMAL
BH CV LOWER VASCULAR LEFT COMMON FEMORAL COMPRESS: NORMAL
BH CV LOWER VASCULAR LEFT COMMON FEMORAL PHASIC: NORMAL
BH CV LOWER VASCULAR LEFT COMMON FEMORAL SPONT: NORMAL
BH CV LOWER VASCULAR RIGHT COMMON FEMORAL AUGMENT: NORMAL
BH CV LOWER VASCULAR RIGHT COMMON FEMORAL COMPRESS: NORMAL
BH CV LOWER VASCULAR RIGHT COMMON FEMORAL PHASIC: NORMAL
BH CV LOWER VASCULAR RIGHT COMMON FEMORAL SPONT: NORMAL
BH CV LOWER VASCULAR RIGHT DISTAL FEMORAL COMPRESS: NORMAL
BH CV LOWER VASCULAR RIGHT GASTRONEMIUS COMPRESS: NORMAL
BH CV LOWER VASCULAR RIGHT GREATER SAPH AK COMPRESS: NORMAL
BH CV LOWER VASCULAR RIGHT GREATER SAPH BK COMPRESS: NORMAL
BH CV LOWER VASCULAR RIGHT LESSER SAPH COMPRESS: NORMAL
BH CV LOWER VASCULAR RIGHT MID FEMORAL AUGMENT: NORMAL
BH CV LOWER VASCULAR RIGHT MID FEMORAL COMPRESS: NORMAL
BH CV LOWER VASCULAR RIGHT MID FEMORAL PHASIC: NORMAL
BH CV LOWER VASCULAR RIGHT MID FEMORAL SPONT: NORMAL
BH CV LOWER VASCULAR RIGHT PERONEAL COMPRESS: NORMAL
BH CV LOWER VASCULAR RIGHT POPLITEAL AUGMENT: NORMAL
BH CV LOWER VASCULAR RIGHT POPLITEAL COMPRESS: NORMAL
BH CV LOWER VASCULAR RIGHT POPLITEAL PHASIC: NORMAL
BH CV LOWER VASCULAR RIGHT POPLITEAL SPONT: NORMAL
BH CV LOWER VASCULAR RIGHT POSTERIOR TIBIAL COMPRESS: NORMAL
BH CV LOWER VASCULAR RIGHT PROFUNDA FEMORAL COMPRESS: NORMAL
BH CV LOWER VASCULAR RIGHT PROXIMAL FEMORAL COMPRESS: NORMAL
BH CV LOWER VASCULAR RIGHT SAPHENOFEMORAL JUNCTION COMPRESS: NORMAL
BILIRUB SERPL-MCNC: 0.8 MG/DL (ref 0–1.2)
BUN SERPL-MCNC: 10 MG/DL (ref 8–23)
BUN/CREAT SERPL: 7.5 (ref 7–25)
CALCIUM SPEC-SCNC: 9.9 MG/DL (ref 8.6–10.5)
CHLORIDE SERPL-SCNC: 102 MMOL/L (ref 98–107)
CO2 SERPL-SCNC: 24 MMOL/L (ref 22–29)
CREAT SERPL-MCNC: 1.34 MG/DL (ref 0.57–1)
D-LACTATE SERPL-SCNC: 0.8 MMOL/L (ref 0.5–2)
DEPRECATED RDW RBC AUTO: 40.7 FL (ref 37–54)
EGFRCR SERPLBLD CKD-EPI 2021: 45.5 ML/MIN/1.73
EOSINOPHIL # BLD AUTO: 0.46 10*3/MM3 (ref 0–0.4)
EOSINOPHIL NFR BLD AUTO: 5.8 % (ref 0.3–6.2)
ERYTHROCYTE [DISTWIDTH] IN BLOOD BY AUTOMATED COUNT: 12.8 % (ref 12.3–15.4)
GLOBULIN UR ELPH-MCNC: 3.1 GM/DL
GLUCOSE SERPL-MCNC: 74 MG/DL (ref 65–99)
HCT VFR BLD AUTO: 43.2 % (ref 34–46.6)
HGB BLD-MCNC: 14.4 G/DL (ref 12–15.9)
IMM GRANULOCYTES # BLD AUTO: 0.02 10*3/MM3 (ref 0–0.05)
IMM GRANULOCYTES NFR BLD AUTO: 0.3 % (ref 0–0.5)
LYMPHOCYTES # BLD AUTO: 0.8 10*3/MM3 (ref 0.7–3.1)
LYMPHOCYTES NFR BLD AUTO: 10.1 % (ref 19.6–45.3)
MAXIMAL PREDICTED HEART RATE: 160 BPM
MCH RBC QN AUTO: 29.1 PG (ref 26.6–33)
MCHC RBC AUTO-ENTMCNC: 33.3 G/DL (ref 31.5–35.7)
MCV RBC AUTO: 87.4 FL (ref 79–97)
MONOCYTES # BLD AUTO: 0.74 10*3/MM3 (ref 0.1–0.9)
MONOCYTES NFR BLD AUTO: 9.4 % (ref 5–12)
NEUTROPHILS NFR BLD AUTO: 5.87 10*3/MM3 (ref 1.7–7)
NEUTROPHILS NFR BLD AUTO: 74.3 % (ref 42.7–76)
NRBC BLD AUTO-RTO: 0 /100 WBC (ref 0–0.2)
PLATELET # BLD AUTO: 249 10*3/MM3 (ref 140–450)
PMV BLD AUTO: 9.4 FL (ref 6–12)
POTASSIUM SERPL-SCNC: 4.2 MMOL/L (ref 3.5–5.2)
PROCALCITONIN SERPL-MCNC: <0.2 NG/ML (ref 0–0.25)
PROT SERPL-MCNC: 8.1 G/DL (ref 6–8.5)
RBC # BLD AUTO: 4.94 10*6/MM3 (ref 3.77–5.28)
SODIUM SERPL-SCNC: 138 MMOL/L (ref 136–145)
STRESS TARGET HR: 136 BPM
WBC NRBC COR # BLD: 7.9 10*3/MM3 (ref 3.4–10.8)

## 2022-04-10 PROCEDURE — 63710000001 ONDANSETRON PER 8 MG: Performed by: EMERGENCY MEDICINE

## 2022-04-10 PROCEDURE — 99283 EMERGENCY DEPT VISIT LOW MDM: CPT

## 2022-04-10 PROCEDURE — 36415 COLL VENOUS BLD VENIPUNCTURE: CPT

## 2022-04-10 PROCEDURE — 80053 COMPREHEN METABOLIC PANEL: CPT | Performed by: EMERGENCY MEDICINE

## 2022-04-10 PROCEDURE — 87040 BLOOD CULTURE FOR BACTERIA: CPT | Performed by: EMERGENCY MEDICINE

## 2022-04-10 PROCEDURE — 93971 EXTREMITY STUDY: CPT | Performed by: INTERNAL MEDICINE

## 2022-04-10 PROCEDURE — 84145 PROCALCITONIN (PCT): CPT | Performed by: EMERGENCY MEDICINE

## 2022-04-10 PROCEDURE — 93971 EXTREMITY STUDY: CPT

## 2022-04-10 PROCEDURE — 85025 COMPLETE CBC W/AUTO DIFF WBC: CPT | Performed by: EMERGENCY MEDICINE

## 2022-04-10 PROCEDURE — 83605 ASSAY OF LACTIC ACID: CPT | Performed by: EMERGENCY MEDICINE

## 2022-04-10 RX ORDER — ONDANSETRON 4 MG/1
4 TABLET, FILM COATED ORAL ONCE
Status: COMPLETED | OUTPATIENT
Start: 2022-04-10 | End: 2022-04-10

## 2022-04-10 RX ORDER — HYDROCODONE BITARTRATE AND ACETAMINOPHEN 7.5; 325 MG/1; MG/1
1 TABLET ORAL ONCE
Status: COMPLETED | OUTPATIENT
Start: 2022-04-10 | End: 2022-04-10

## 2022-04-10 RX ORDER — CEPHALEXIN 500 MG/1
500 CAPSULE ORAL 2 TIMES DAILY
Qty: 20 CAPSULE | Refills: 0 | Status: SHIPPED | OUTPATIENT
Start: 2022-04-10 | End: 2022-04-20

## 2022-04-10 RX ORDER — CEPHALEXIN 250 MG/1
500 CAPSULE ORAL ONCE
Status: COMPLETED | OUTPATIENT
Start: 2022-04-10 | End: 2022-04-10

## 2022-04-10 RX ADMIN — ONDANSETRON HYDROCHLORIDE 4 MG: 4 TABLET, FILM COATED ORAL at 20:01

## 2022-04-10 RX ADMIN — CEPHALEXIN 500 MG: 250 CAPSULE ORAL at 20:53

## 2022-04-10 RX ADMIN — HYDROCODONE BITARTRATE AND ACETAMINOPHEN 1 TABLET: 7.5; 325 TABLET ORAL at 20:00

## 2022-04-10 NOTE — ED PROVIDER NOTES
Subjective   Pt is a 60 yo female presenting to ED with complaints of leg pain. PMHx significant for DM (non insulin), HLD, RA and Asthma. Pt reports noticing right calf pain and swelling today. Pain worse with bearing weight. She denies any injury. She reports warmth and a small knot to left medial calf. She denies numbness or weakness to LE. She denies fever, chills, CP, SOB or cough. She denies recent antibiotics. She denies hx of DVT. She does not take blood thinners other than ASA. She denies tobacco, drug or ETOH use.       History provided by:  Patient and medical records      Review of Systems   Constitutional: Negative for chills and fever.   Eyes: Negative for visual disturbance.   Respiratory: Negative for cough and shortness of breath.    Cardiovascular: Positive for leg swelling (right lower leg). Negative for chest pain.   Gastrointestinal: Negative for abdominal pain, diarrhea, nausea and vomiting.   Musculoskeletal: Positive for myalgias (right calf). Negative for arthralgias, back pain, neck pain and neck stiffness.   Skin: Negative for wound.   Neurological: Negative for dizziness, weakness, numbness and headaches.       Past Medical History:   Diagnosis Date   • Asthma    • DM (diabetes mellitus), type 2, uncontrolled (CMS/HCC)    • Hyperlipidemia    • Rheumatoid arthritis (CMS/[facility])        Allergies   Allergen Reactions   • Bactrim [Sulfamethoxazole-Trimethoprim] Rash       Past Surgical History:   Procedure Laterality Date   • HYSTERECTOMY  02/2014       Family History   Problem Relation Age of Onset   • Heart attack Mother 84   • Hypertension Mother    • Aneurysm Mother    • Arthritis Father    • Diabetes Father    • Hypertension Father    • Dementia Father    • Diabetes Maternal Grandmother    • No Known Problems Maternal Grandfather    • No Known Problems Paternal Grandmother    • No Known Problems Paternal Grandfather        Social History     Socioeconomic History   • Marital status:     Tobacco Use   • Smoking status: Former Smoker     Packs/day: 1.00     Years: 2.00     Pack years: 2.00     Quit date: 10/19/1997     Years since quittin.4   • Smokeless tobacco: Never Used   Substance and Sexual Activity   • Alcohol use: No   • Drug use: No   • Sexual activity: Defer           Objective   Physical Exam  Vitals and nursing note reviewed.   Constitutional:       General: She is not in acute distress.     Appearance: She is well-developed. She is obese.   HENT:      Head: Atraumatic.      Nose: Nose normal.   Eyes:      General: Lids are normal.      Conjunctiva/sclera: Conjunctivae normal.      Pupils: Pupils are equal, round, and reactive to light.   Cardiovascular:      Rate and Rhythm: Normal rate and regular rhythm.      Heart sounds: Normal heart sounds.   Pulmonary:      Effort: Pulmonary effort is normal.      Breath sounds: Normal breath sounds. No wheezing.   Abdominal:      General: There is no distension.      Palpations: Abdomen is soft.      Tenderness: There is no abdominal tenderness. There is no guarding or rebound.   Musculoskeletal:         General: Normal range of motion.      Cervical back: Normal range of motion and neck supple.      Right lower leg: Swelling (mild) and tenderness (medial) present. No deformity or bony tenderness.        Legs:       Comments: Small 1-2 cm nodular area right medial calf but no induration or obvious abscess / skin injury. Mild surrounding erythema, warmth and TTP.    Skin:     General: Skin is warm and dry.      Findings: No erythema or rash.   Neurological:      Mental Status: She is alert and oriented to person, place, and time.      Sensory: No sensory deficit.   Psychiatric:         Speech: Speech normal.         Behavior: Behavior normal.         Procedures           ED Course  ED Course as of 04/10/22 2041   Sun Apr 10, 2022   2005 Lactate: 0.8 [RT]    WBC: 7.90 [RT]    Procalcitonin: <0.20 [RT]    Creatinine(!): 1.34 [RT]       ED Course User Index  [RT] Radha Orozco PA      Reviewed old records.     Doppler negative for DVT.     Labs unremarkable in ED other than mild elevation in Cr. Discussed ED workup and tx plan with patient. She is agreeable and will f/u closely in 1-2 days with PCP. She understands to return to ED if new / worse sx. Will dc home on Keflex for mild right calf cellulitis.       Recent Results (from the past 24 hour(s))   Duplex Venous Lower Extremity - Right    Collection Time: 04/10/22  6:27 PM   Result Value Ref Range    BSA 1.9 m^2     CV ECHO SORIN - BZI_BMI 36.4 kilograms/m^2     CV ECHO SORIN - BSA(HAYCOCK) 2.0 m^2     CV ECHO SORIN - BZI_METRIC_WEIGHT 90.3 kg     CV ECHO SORIN - BZI_METRIC_HEIGHT 157.5 cm    Right Common Femoral Spont Y     Right Common Femoral Phasic Y     Right Common Femoral Augment Y     Right Common Femoral Compress C     Right Saphenofemoral Junction Compress C     Right Profunda Femoral Compress C     Right Proximal Femoral Compress C     Right Mid Femoral Spont Y     Right Mid Femoral Phasic Y     Right Mid Femoral Augment Y     Right Mid Femoral Compress C     Right Distal Femoral Compress C     Right Popliteal Spont Y     Right Popliteal Phasic Y     Right Popliteal Augment Y     Right Popliteal Compress C     Right Posterior Tibial Compress C     Right Peroneal Compress C     Right Gastronemius Compress C     Right Greater Saph AK Compress C     Right Greater Saph BK Compress C     Right Lesser Saph Compress C     Left Common Femoral Spont Y     Left Common Femoral Phasic Y     Left Common Femoral Augment Y     Left Common Femoral Compress C     Target HR (85%) 136 bpm    Max. Pred. HR (100%) 160 bpm   Comprehensive Metabolic Panel    Collection Time: 04/10/22  7:38 PM    Specimen: Blood   Result Value Ref Range    Glucose 74 65 - 99 mg/dL    BUN 10 8 - 23 mg/dL    Creatinine 1.34 (H) 0.57 - 1.00 mg/dL    Sodium 138 136 - 145 mmol/L    Potassium 4.2 3.5 - 5.2 mmol/L     Chloride 102 98 - 107 mmol/L    CO2 24.0 22.0 - 29.0 mmol/L    Calcium 9.9 8.6 - 10.5 mg/dL    Total Protein 8.1 6.0 - 8.5 g/dL    Albumin 5.00 3.50 - 5.20 g/dL    ALT (SGPT) 20 1 - 33 U/L    AST (SGOT) 27 1 - 32 U/L    Alkaline Phosphatase 119 (H) 39 - 117 U/L    Total Bilirubin 0.8 0.0 - 1.2 mg/dL    Globulin 3.1 gm/dL    A/G Ratio 1.6 g/dL    BUN/Creatinine Ratio 7.5 7.0 - 25.0    Anion Gap 12.0 5.0 - 15.0 mmol/L    eGFR 45.5 (L) >60.0 mL/min/1.73   Lactic Acid, Plasma    Collection Time: 04/10/22  7:38 PM    Specimen: Blood   Result Value Ref Range    Lactate 0.8 0.5 - 2.0 mmol/L   Procalcitonin    Collection Time: 04/10/22  7:38 PM    Specimen: Blood   Result Value Ref Range    Procalcitonin <0.20 0.00 - 0.25 ng/mL   CBC Auto Differential    Collection Time: 04/10/22  8:26 PM    Specimen: Blood   Result Value Ref Range    WBC 7.90 3.40 - 10.80 10*3/mm3    RBC 4.94 3.77 - 5.28 10*6/mm3    Hemoglobin 14.4 12.0 - 15.9 g/dL    Hematocrit 43.2 34.0 - 46.6 %    MCV 87.4 79.0 - 97.0 fL    MCH 29.1 26.6 - 33.0 pg    MCHC 33.3 31.5 - 35.7 g/dL    RDW 12.8 12.3 - 15.4 %    RDW-SD 40.7 37.0 - 54.0 fl    MPV 9.4 6.0 - 12.0 fL    Platelets 249 140 - 450 10*3/mm3    Neutrophil % 74.3 42.7 - 76.0 %    Lymphocyte % 10.1 (L) 19.6 - 45.3 %    Monocyte % 9.4 5.0 - 12.0 %    Eosinophil % 5.8 0.3 - 6.2 %    Basophil % 0.1 0.0 - 1.5 %    Immature Grans % 0.3 0.0 - 0.5 %    Neutrophils, Absolute 5.87 1.70 - 7.00 10*3/mm3    Lymphocytes, Absolute 0.80 0.70 - 3.10 10*3/mm3    Monocytes, Absolute 0.74 0.10 - 0.90 10*3/mm3    Eosinophils, Absolute 0.46 (H) 0.00 - 0.40 10*3/mm3    Basophils, Absolute 0.01 0.00 - 0.20 10*3/mm3    Immature Grans, Absolute 0.02 0.00 - 0.05 10*3/mm3    nRBC 0.0 0.0 - 0.2 /100 WBC     Note: In addition to lab results from this visit, the labs listed above may include labs taken at another facility or during a different encounter within the last 24 hours. Please correlate lab times with ED admission and  "discharge times for further clarification of the services performed during this visit.    No orders to display     Vitals:    04/10/22 1711   BP: 138/94   BP Location: Left arm   Patient Position: Sitting   Pulse: 99   Resp: 22   Temp: 98.5 °F (36.9 °C)   TempSrc: Oral   SpO2: 95%   Weight: 90.3 kg (199 lb)   Height: 157.5 cm (62\")     Medications   cephalexin (KEFLEX) capsule 500 mg (has no administration in time range)   HYDROcodone-acetaminophen (NORCO) 7.5-325 MG per tablet 1 tablet (1 tablet Oral Given 4/10/22 2000)   ondansetron (ZOFRAN) tablet 4 mg (4 mg Oral Given 4/10/22 2001)     ECG/EMG Results (last 24 hours)     ** No results found for the last 24 hours. **        No orders to display       DISCHARGE    Patient discharged in stable condition.    Reviewed implications of results, diagnosis, meds, responsibility to follow up, warning signs and symptoms of possible worsening, potential complications and reasons to return to ER.    Patient/Family voiced understanding of above instructions.    Discussed plan for discharge, as there is no emergent indication for admission.  Pt/family is agreeable and understands need for follow up and possible repeat testing.  Pt/family is aware that discharge does not mean that nothing is wrong but that it indicates no emergency is currently present that requires admission and they must continue care with follow-up as given below or with a physician of their choice.     FOLLOW-UP  Deidre Schultz DO  [address] [facility] [address] [phone]    Schedule an appointment as soon as possible for a visit       [facility] Emergency Department  [facility] [facility] [address] [phone]    If symptoms worsen         Medication List      New Prescriptions    cephalexin 500 MG capsule  Commonly known as: KEFLEX  Take 1 capsule by mouth 2 (Two) Times a Day for 10 days.           Where to Get Your Medications      These medications " were sent to [facility]/pharmacy #[id number] - [place], [place] - [address] [facility] - [phone] [facility] [phone] FX  [address] [place] [address]    Phone: [phone]   · cephalexin 500 MG capsule                                               [facility]    Final diagnoses:   Cellulitis of right leg   Pain and swelling of right lower leg   Renal insufficiency   History of diabetes mellitus   History of rheumatoid arthritis       ED Disposition  ED Disposition     ED Disposition   Discharge    Condition   Stable    Comment   --             Deidre Schultz,   [address] [facility] [address] [phone]    Schedule an appointment as soon as possible for a visit       [facility] Emergency Department  [address] [facility] [address] [phone]    If symptoms worsen         Medication List      New Prescriptions    cephalexin 500 MG capsule  Commonly known as: KEFLEX  Take 1 capsule by mouth 2 (Two) Times a Day for 10 days.           Where to Get Your Medications      These medications were sent to [facility]/pharmacy #[id number] - [place], [place] - [address] [facility] - [phone] [place] [phone] FX  [address] [place] [address]    Phone: [phone]   · cephalexin 500 MG capsule          Radha Orozco PA  04/10/22 2049

## 2022-04-15 LAB
BACTERIA SPEC AEROBE CULT: NORMAL
BACTERIA SPEC AEROBE CULT: NORMAL

## 2022-05-31 ENCOUNTER — OFFICE VISIT (OUTPATIENT)
Dept: ENDOCRINOLOGY | Facility: CLINIC | Age: 62
End: 2022-05-31

## 2022-05-31 VITALS
OXYGEN SATURATION: 98 % | DIASTOLIC BLOOD PRESSURE: 66 MMHG | BODY MASS INDEX: 36.44 KG/M2 | SYSTOLIC BLOOD PRESSURE: 122 MMHG | HEART RATE: 82 BPM | WEIGHT: 198 LBS | HEIGHT: 62 IN

## 2022-05-31 DIAGNOSIS — E11.9 CONTROLLED TYPE 2 DIABETES MELLITUS WITHOUT COMPLICATION, WITHOUT LONG-TERM CURRENT USE OF INSULIN: Primary | ICD-10-CM

## 2022-05-31 DIAGNOSIS — N32.81 OVERACTIVE BLADDER: ICD-10-CM

## 2022-05-31 DIAGNOSIS — E78.2 MIXED HYPERLIPIDEMIA: ICD-10-CM

## 2022-05-31 LAB
EXPIRATION DATE: NORMAL
GLUCOSE BLDC GLUCOMTR-MCNC: 85 MG/DL (ref 70–130)
HBA1C MFR BLD: 5.7 %
Lab: NORMAL

## 2022-05-31 PROCEDURE — 82947 ASSAY GLUCOSE BLOOD QUANT: CPT | Performed by: INTERNAL MEDICINE

## 2022-05-31 PROCEDURE — 83036 HEMOGLOBIN GLYCOSYLATED A1C: CPT | Performed by: INTERNAL MEDICINE

## 2022-05-31 PROCEDURE — 99213 OFFICE O/P EST LOW 20 MIN: CPT | Performed by: INTERNAL MEDICINE

## 2022-05-31 RX ORDER — OXYBUTYNIN CHLORIDE 5 MG/1
5 TABLET ORAL 2 TIMES DAILY
Qty: 60 TABLET | Refills: 5 | Status: SHIPPED | OUTPATIENT
Start: 2022-05-31 | End: 2022-12-05

## 2022-05-31 NOTE — PROGRESS NOTES
"Chief Complaint   Patient presents with   • Diabetes     Follow up      HPI:   Sophie Medina is a 61 y.o.female who returns to endocrine clinic for f/u evaluation of pt's Type 2 DM. Last visit 11/29/2021. Her history is as follows:    Interim Events:  - Recently treated with Abx for possible UTI. Pt having urinary urgency overnight. Abx did not alleviate the urgency. See below.   - On plaquenil for RA  - Had Cellulitis of Right leg in 04/2022  - Reports eating more candy since last visit    1) Type 2 DM with no known associated complications at this time:  - diagnosed in 2014 during routine evaluation. Presented with c/o polyuria  - pt states she did not tolerate the ER metformin prescribed in 8/2016 - said it caused \"low blood sugars\" and \"dizziness\"  - was on low dose metformin, Had pt go off medication in 08/2019 to see if she could manage with dietary changes alone.     Current DM Medications:  - metformin 500 mg daily    Glucometer Review: no meter for today    DM Health Maintenance:  Ophtho: Last exam 01/2021 - no retinopathy per pt. On plaquenil for RA  Podiatry: N/A  Monofilament / foot exam: 11/2021  Lipids: (11/2021) Tchol 148, TG 47, HDL 52, LDL 86 - on atorvastatin 20 mg daily  Urine Microalb/Cr ratio: (11/2021) normal  TSH: (11/2021) 0.880 - normal  CMP: (14/2022) Cr 1.34, eGFR 45.5, LFTs WNL  CBC: (04/2022) WNL  Vit B12: (11/2021) 1,093 - on supplements    2) mixed hyperlipidemia:  - tolerating atorvastatin 20 mg daily  Lipids: (11/2021) Tchol 148, TG 47, HDL 52, LDL 86 - on atorvastatin 20 mg daily    3) urinary frequency:   - has been occurring for the past few months. Reports mainly occurs overnight. Does not empty bladder fully.  - Pt's PCP treated pt with Abx course. Pt states the Abx did not help. PCP had pt complete a CT abd/pelvis: result pending.    Other medical history: diagnosed with RA, on plaquenil, meloxicam     Review of Systems   Constitutional: Negative for fatigue.        Wt " "stable   HENT: Negative.  Negative for congestion.    Eyes: Negative for pain, redness, itching and visual disturbance.   Respiratory: Positive for wheezing (occasional). Negative for cough and shortness of breath.    Cardiovascular: Negative.  Negative for chest pain.   Gastrointestinal: Negative.    Endocrine: Negative.  Negative for polydipsia, polyphagia and polyuria.   Genitourinary: Positive for frequency and urgency.   Musculoskeletal: Positive for arthralgias. Negative for joint swelling and myalgias.   Skin: Negative.    Neurological: Negative.  Negative for dizziness and headaches.   Hematological: Negative.    Psychiatric/Behavioral: Positive for sleep disturbance.       The following portions of the patient's history were reviewed and updated as appropriate: allergies, current medications, past family history, past medical history, past social history, past surgical history and problem list.    /66   Pulse 82   Ht 157.5 cm (62\")   Wt 89.8 kg (198 lb)   SpO2 98%   BMI 36.21 kg/m²   Physical Exam   Constitutional: She is oriented to person, place, and time. She appears well-developed. No distress.   Overweight     HENT:   Head: Normocephalic.   Eyes: Pupils are equal, round, and reactive to light. Conjunctivae are normal.   Neck: No thyromegaly present.   No palpable thyroid nodules     Cardiovascular: Normal rate, regular rhythm and normal heart sounds.   No murmur heard.  Pulmonary/Chest: Effort normal and breath sounds normal. She has no wheezes.   Abdominal: Soft. Bowel sounds are normal. She exhibits no mass. There is no abdominal tenderness.   Lymphadenopathy:     She has no cervical adenopathy.   Neurological: She is alert and oriented to person, place, and time. No cranial nerve deficit.   Skin: Skin is warm. No rash noted. No erythema.   Acanthosis nigricans   Psychiatric: Her behavior is normal.   Vitals reviewed.    LABS/IMAGING: outside records reviewed and summarized in HPI  Results " for orders placed or performed in visit on 05/31/22   POC Glucose, Blood    Specimen: Blood   Result Value Ref Range    Glucose 85 70 - 130 mg/dL   POC Glycosylated Hemoglobin (Hb A1C)    Specimen: Blood   Result Value Ref Range    Hemoglobin A1C 5.7 %    Lot Number [id number]     Expiration Date 1/17/24      ASSESSMENT/PLAN:  1) Type 2 DM with no associated complications at this time: controlled, her A1C% is 5.7% today and was 6.1% last visit.  - Reviewed treatment options.   - continue metformin 500 mg daily.  - counseled pt on carb consistent meal planning    DM health maintenance labs checked 11/2021 reviewed. Due next visit.    2) mixed hyperlipidemia: controlled  Lipids: (11/2021) Tchol 148, TG 47, HDL 52, LDL 86 - on atorvastatin 20 mg daily  - Counseled pt that a statin is recommended for primary prevention of CVD  - continue atorvastatin 20 mg daily    3) Overactive bladder:  - having urinary urgency, worse overnight. Recent Abx treatment by PCP did not help symptom. Suspect pt with overactive bladder.   - Will have pt try oxybutynin 5 mg q evening. If tolerated and also having symptoms in the daytime, can increase dose to 5 mg BID.    RTC 6 months    Signed: Nicki Ayala MD

## 2022-06-06 ENCOUNTER — TRANSCRIBE ORDERS (OUTPATIENT)
Dept: ADMINISTRATIVE | Facility: HOSPITAL | Age: 62
End: 2022-06-06

## 2022-06-06 DIAGNOSIS — R11.0 NAUSEA: ICD-10-CM

## 2022-06-06 DIAGNOSIS — R39.89 BLADDER PAIN: Primary | ICD-10-CM

## 2022-06-06 DIAGNOSIS — R35.1 NOCTURIA: ICD-10-CM

## 2022-06-18 ENCOUNTER — HOSPITAL ENCOUNTER (OUTPATIENT)
Dept: CT IMAGING | Facility: HOSPITAL | Age: 62
Discharge: HOME | End: 2022-06-18
Admitting: FAMILY MEDICINE

## 2022-06-18 DIAGNOSIS — R35.1 NOCTURIA: ICD-10-CM

## 2022-06-18 DIAGNOSIS — R39.89 BLADDER PAIN: ICD-10-CM

## 2022-06-18 DIAGNOSIS — R11.0 NAUSEA: ICD-10-CM

## 2022-06-18 PROCEDURE — 25010000002 IOPAMIDOL 61 % SOLUTION: Performed by: FAMILY MEDICINE

## 2022-06-18 PROCEDURE — 82565 ASSAY OF CREATININE: CPT

## 2022-06-18 PROCEDURE — 74178 CT ABD&PLV WO CNTR FLWD CNTR: CPT

## 2022-06-18 RX ADMIN — IOPAMIDOL 100 ML: 612 INJECTION, SOLUTION INTRAVENOUS at 14:38

## 2022-06-21 LAB — CREAT BLDA-MCNC: 0.9 MG/DL (ref 0.6–1.3)

## 2022-12-05 ENCOUNTER — OFFICE VISIT (OUTPATIENT)
Dept: ENDOCRINOLOGY | Facility: CLINIC | Age: 62
End: 2022-12-05

## 2022-12-05 ENCOUNTER — LAB (OUTPATIENT)
Dept: LAB | Facility: HOSPITAL | Age: 62
End: 2022-12-05

## 2022-12-05 VITALS
HEIGHT: 62 IN | SYSTOLIC BLOOD PRESSURE: 120 MMHG | OXYGEN SATURATION: 100 % | HEART RATE: 79 BPM | DIASTOLIC BLOOD PRESSURE: 80 MMHG | BODY MASS INDEX: 37.91 KG/M2 | WEIGHT: 206 LBS

## 2022-12-05 DIAGNOSIS — E11.9 CONTROLLED TYPE 2 DIABETES MELLITUS WITHOUT COMPLICATION, WITHOUT LONG-TERM CURRENT USE OF INSULIN: Primary | ICD-10-CM

## 2022-12-05 DIAGNOSIS — E78.2 MIXED HYPERLIPIDEMIA: ICD-10-CM

## 2022-12-05 LAB
ALBUMIN SERPL-MCNC: 4.5 G/DL (ref 3.5–5.2)
ALBUMIN/GLOB SERPL: 1.6 G/DL
ALP SERPL-CCNC: 94 U/L (ref 39–117)
ALT SERPL W P-5'-P-CCNC: 16 U/L (ref 1–33)
ANION GAP SERPL CALCULATED.3IONS-SCNC: 9.2 MMOL/L (ref 5–15)
AST SERPL-CCNC: 23 U/L (ref 1–32)
BILIRUB SERPL-MCNC: 0.4 MG/DL (ref 0–1.2)
BUN SERPL-MCNC: 13 MG/DL (ref 8–23)
BUN/CREAT SERPL: 11.3 (ref 7–25)
CALCIUM SPEC-SCNC: 10 MG/DL (ref 8.6–10.5)
CHLORIDE SERPL-SCNC: 104 MMOL/L (ref 98–107)
CHOLEST SERPL-MCNC: 146 MG/DL (ref 0–200)
CO2 SERPL-SCNC: 25.8 MMOL/L (ref 22–29)
CREAT SERPL-MCNC: 1.15 MG/DL (ref 0.57–1)
DEPRECATED RDW RBC AUTO: 36.5 FL (ref 37–54)
EGFRCR SERPLBLD CKD-EPI 2021: 54.3 ML/MIN/1.73
ERYTHROCYTE [DISTWIDTH] IN BLOOD BY AUTOMATED COUNT: 12.1 % (ref 12.3–15.4)
GLOBULIN UR ELPH-MCNC: 2.9 GM/DL
GLUCOSE BLDC GLUCOMTR-MCNC: 89 MG/DL (ref 70–130)
GLUCOSE SERPL-MCNC: 86 MG/DL (ref 65–99)
HCT VFR BLD AUTO: 37.3 % (ref 34–46.6)
HDLC SERPL-MCNC: 53 MG/DL (ref 40–60)
HGB BLD-MCNC: 12.8 G/DL (ref 12–15.9)
LDLC SERPL CALC-MCNC: 83 MG/DL (ref 0–100)
LDLC/HDLC SERPL: 1.59 {RATIO}
MCH RBC QN AUTO: 28.9 PG (ref 26.6–33)
MCHC RBC AUTO-ENTMCNC: 34.3 G/DL (ref 31.5–35.7)
MCV RBC AUTO: 84.2 FL (ref 79–97)
PLATELET # BLD AUTO: 272 10*3/MM3 (ref 140–450)
PMV BLD AUTO: 9.9 FL (ref 6–12)
POTASSIUM SERPL-SCNC: 4.1 MMOL/L (ref 3.5–5.2)
PROT SERPL-MCNC: 7.4 G/DL (ref 6–8.5)
RBC # BLD AUTO: 4.43 10*6/MM3 (ref 3.77–5.28)
SODIUM SERPL-SCNC: 139 MMOL/L (ref 136–145)
TRIGL SERPL-MCNC: 44 MG/DL (ref 0–150)
TSH SERPL DL<=0.05 MIU/L-ACNC: 1.37 UIU/ML (ref 0.27–4.2)
VLDLC SERPL-MCNC: 10 MG/DL (ref 5–40)
WBC NRBC COR # BLD: 7 10*3/MM3 (ref 3.4–10.8)

## 2022-12-05 PROCEDURE — 82043 UR ALBUMIN QUANTITATIVE: CPT | Performed by: INTERNAL MEDICINE

## 2022-12-05 PROCEDURE — 83036 HEMOGLOBIN GLYCOSYLATED A1C: CPT | Performed by: INTERNAL MEDICINE

## 2022-12-05 PROCEDURE — 82607 VITAMIN B-12: CPT | Performed by: INTERNAL MEDICINE

## 2022-12-05 PROCEDURE — 82570 ASSAY OF URINE CREATININE: CPT | Performed by: INTERNAL MEDICINE

## 2022-12-05 PROCEDURE — 80061 LIPID PANEL: CPT | Performed by: INTERNAL MEDICINE

## 2022-12-05 PROCEDURE — 82947 ASSAY GLUCOSE BLOOD QUANT: CPT | Performed by: INTERNAL MEDICINE

## 2022-12-05 PROCEDURE — 80050 GENERAL HEALTH PANEL: CPT | Performed by: INTERNAL MEDICINE

## 2022-12-05 PROCEDURE — 99213 OFFICE O/P EST LOW 20 MIN: CPT | Performed by: INTERNAL MEDICINE

## 2022-12-05 NOTE — PROGRESS NOTES
"Chief Complaint   Patient presents with   • Diabetes     Follow up      HPI:   Sophie Medina is a 62 y.o.female who returns to endocrine clinic for f/u evaluation of pt's Type 2 DM. Last visit 05/31/2022. Her history is as follows:    Interim Events:  - pt stopped the oxybutinin 5 mg BID after a few doses. She states the urinary frequency she had previously resolved on its own  - Has had weight gain  - Reports eating more higher carb foods  - Has been drinking more diet sodas    1) Type 2 DM with no known associated complications at this time:  - diagnosed in 2014 during routine evaluation. Presented with c/o polyuria  - pt states she did not tolerate the ER metformin prescribed in 8/2016 - said it caused \"low blood sugars\" and \"dizziness\"  - was on low dose metformin, Had pt go off medication in 08/2019 to see if she could manage with dietary changes alone.     Current DM Medications:  - metformin 500 mg nightly    Glucometer Review: no meter for today    DM Health Maintenance:  Ophtho: Last exam 01/2021 - no retinopathy per pt. On plaquenil for RA  Podiatry: N/A  Monofilament / foot exam: 11/2021  Lipids: (12/2022) Tchol 146, TG 44, HDL 53, LDL 83 - on atorvastatin 20 mg daily  Urine Microalb/Cr ratio: (12/2022) normal at 9.3  TSH: (12/2022) 1.370 - normal  CMP: (12/2022) Cr 1.15, eGFR 54.3, LFTs WNL  CBC: (12/2022) WNL  Vit B12: (12/2022) 1,417 - on prenatal vitamin     2) mixed hyperlipidemia:  - tolerating atorvastatin 20 mg daily  Lipids: (12/2022) Tchol 146, TG 44, HDL 53, LDL 83 - on atorvastatin 20 mg daily      Other medical history: diagnosed with RA, on plaquenil     Review of Systems   Constitutional: Negative for fatigue.        Wt gain   HENT: Positive for congestion and rhinorrhea.         Missed allergy shots   Eyes: Negative for pain, redness, itching and visual disturbance.   Respiratory: Positive for wheezing (occasional). Negative for cough and shortness of breath.    Cardiovascular: " "Negative.  Negative for chest pain.   Gastrointestinal: Negative.    Endocrine: Negative.  Negative for polydipsia, polyphagia and polyuria.   Genitourinary: Negative for frequency and urgency.   Musculoskeletal: Positive for arthralgias. Negative for joint swelling and myalgias.   Skin: Negative.    Neurological: Negative.  Negative for dizziness and headaches.   Hematological: Negative.    Psychiatric/Behavioral: Positive for sleep disturbance.       The following portions of the patient's history were reviewed and updated as appropriate: allergies, current medications, past family history, past medical history, past social history, past surgical history and problem list.      /80   Pulse 79   Ht 157.5 cm (62\")   Wt 93.4 kg (206 lb)   SpO2 100%   BMI 37.68 kg/m²   Physical Exam   Constitutional: She is oriented to person, place, and time. She appears well-developed. No distress.   BMI 37.68   HENT:   Head: Normocephalic.   Eyes: Pupils are equal, round, and reactive to light. Conjunctivae are normal.   Neck: No thyromegaly present.   No palpable thyroid nodules     Cardiovascular: Normal rate, regular rhythm and normal heart sounds.   No murmur heard.  Pulmonary/Chest: Effort normal and breath sounds normal. She has no wheezes.   Abdominal: Soft. Normal appearance and bowel sounds are normal. She exhibits no mass. There is no abdominal tenderness.   Lymphadenopathy:     She has no cervical adenopathy.   Neurological: She is alert and oriented to person, place, and time. No cranial nerve deficit.   Skin: Skin is warm. No rash noted. No erythema.   Acanthosis nigricans   Psychiatric: Her behavior is normal.   Vitals reviewed.    LABS/IMAGING: outside records reviewed and summarized in HPI  Results for orders placed or performed in visit on 12/05/22   CBC (No Diff)    Specimen: Blood   Result Value Ref Range    WBC 7.00 3.40 - 10.80 10*3/mm3    RBC 4.43 3.77 - 5.28 10*6/mm3    Hemoglobin 12.8 12.0 - 15.9 " g/dL    Hematocrit 37.3 34.0 - 46.6 %    MCV 84.2 79.0 - 97.0 fL    MCH 28.9 26.6 - 33.0 pg    MCHC 34.3 31.5 - 35.7 g/dL    RDW 12.1 (L) 12.3 - 15.4 %    RDW-SD 36.5 (L) 37.0 - 54.0 fl    MPV 9.9 6.0 - 12.0 fL    Platelets 272 140 - 450 10*3/mm3   Comprehensive Metabolic Panel    Specimen: Blood   Result Value Ref Range    Glucose 86 65 - 99 mg/dL    BUN 13 8 - 23 mg/dL    Creatinine 1.15 (H) 0.57 - 1.00 mg/dL    Sodium 139 136 - 145 mmol/L    Potassium 4.1 3.5 - 5.2 mmol/L    Chloride 104 98 - 107 mmol/L    CO2 25.8 22.0 - 29.0 mmol/L    Calcium 10.0 8.6 - 10.5 mg/dL    Total Protein 7.4 6.0 - 8.5 g/dL    Albumin 4.50 3.50 - 5.20 g/dL    ALT (SGPT) 16 1 - 33 U/L    AST (SGOT) 23 1 - 32 U/L    Alkaline Phosphatase 94 39 - 117 U/L    Total Bilirubin 0.4 0.0 - 1.2 mg/dL    Globulin 2.9 gm/dL    A/G Ratio 1.6 g/dL    BUN/Creatinine Ratio 11.3 7.0 - 25.0    Anion Gap 9.2 5.0 - 15.0 mmol/L    eGFR 54.3 (L) >60.0 mL/min/1.73   Hemoglobin A1c    Specimen: Blood   Result Value Ref Range    Hemoglobin A1C 6.30 (H) 4.80 - 5.60 %   Lipid Panel    Specimen: Blood   Result Value Ref Range    Total Cholesterol 146 0 - 200 mg/dL    Triglycerides 44 0 - 150 mg/dL    HDL Cholesterol 53 40 - 60 mg/dL    LDL Cholesterol  83 0 - 100 mg/dL    VLDL Cholesterol 10 5 - 40 mg/dL    LDL/HDL Ratio 1.59    Microalbumin / Creatinine Urine Ratio - Urine, Clean Catch    Specimen: Urine, Clean Catch   Result Value Ref Range    Microalbumin/Creatinine Ratio 9.3 mg/g    Creatinine, Urine 171.6 mg/dL    Microalbumin, Urine 1.6 mg/dL   TSH    Specimen: Blood   Result Value Ref Range    TSH 1.370 0.270 - 4.200 uIU/mL   Vitamin B12    Specimen: Blood   Result Value Ref Range    Vitamin B-12 1,417 (H) 211 - 946 pg/mL   POC Glucose, Blood    Specimen: Blood   Result Value Ref Range    Glucose 89 70 - 130 mg/dL     ASSESSMENT/PLAN:  1) Type 2 DM with no associated complications at this time: controlled, her A1C% is 6.30% today and was 5.7% last visit.  -  Reviewed treatment options.   - continue metformin 500 mg daily.  - counseled pt on carb consistent meal planning    DM health maintenance labs checked today and reviewed. Cr has improved since last visit.    2) mixed hyperlipidemia: controlled  Lipids: (12/2022) Tchol 146, TG 44, HDL 53, LDL 83 - on atorvastatin 20 mg daily  - Counseled pt that a statin is recommended for primary prevention of CVD  - continue atorvastatin 20 mg daily    RTC 6 months    Signed: Nciki Ayala MD

## 2022-12-06 LAB
ALBUMIN UR-MCNC: 1.6 MG/DL
CREAT UR-MCNC: 171.6 MG/DL
HBA1C MFR BLD: 6.3 % (ref 4.8–5.6)
MICROALBUMIN/CREAT UR: 9.3 MG/G
VIT B12 BLD-MCNC: 1417 PG/ML (ref 211–946)

## 2022-12-08 ENCOUNTER — TELEPHONE (OUTPATIENT)
Dept: ENDOCRINOLOGY | Facility: CLINIC | Age: 62
End: 2022-12-08

## 2022-12-09 NOTE — TELEPHONE ENCOUNTER
Spoke to patient about lab results. See clinic note from 12/05/2022 for details.   Signed: Nicki Ayala MD

## 2023-04-07 DIAGNOSIS — E11.9 CONTROLLED TYPE 2 DIABETES MELLITUS WITHOUT COMPLICATION, WITHOUT LONG-TERM CURRENT USE OF INSULIN: ICD-10-CM

## 2023-04-11 DIAGNOSIS — E78.2 MIXED HYPERLIPIDEMIA: ICD-10-CM

## 2023-04-11 RX ORDER — ATORVASTATIN CALCIUM 20 MG/1
20 TABLET, FILM COATED ORAL DAILY
Qty: 90 TABLET | Refills: 3 | Status: SHIPPED | OUTPATIENT
Start: 2023-04-11

## 2023-12-11 ENCOUNTER — OFFICE VISIT (OUTPATIENT)
Dept: ENDOCRINOLOGY | Facility: CLINIC | Age: 63
End: 2023-12-11
Payer: COMMERCIAL

## 2023-12-11 VITALS
SYSTOLIC BLOOD PRESSURE: 124 MMHG | BODY MASS INDEX: 37.36 KG/M2 | OXYGEN SATURATION: 97 % | WEIGHT: 203 LBS | DIASTOLIC BLOOD PRESSURE: 82 MMHG | HEART RATE: 77 BPM | HEIGHT: 62 IN

## 2023-12-11 DIAGNOSIS — E11.9 CONTROLLED TYPE 2 DIABETES MELLITUS WITHOUT COMPLICATION, WITHOUT LONG-TERM CURRENT USE OF INSULIN: Primary | ICD-10-CM

## 2023-12-11 DIAGNOSIS — N18.2 STAGE 2 CHRONIC KIDNEY DISEASE: ICD-10-CM

## 2023-12-11 DIAGNOSIS — E78.2 MIXED HYPERLIPIDEMIA: ICD-10-CM

## 2023-12-11 LAB
EXPIRATION DATE: ABNORMAL
GLUCOSE BLDC GLUCOMTR-MCNC: 87 MG/DL (ref 70–130)
HBA1C MFR BLD: 6.3 % (ref 4.5–5.7)
Lab: ABNORMAL

## 2023-12-11 PROCEDURE — 82947 ASSAY GLUCOSE BLOOD QUANT: CPT | Performed by: INTERNAL MEDICINE

## 2023-12-11 PROCEDURE — 82570 ASSAY OF URINE CREATININE: CPT | Performed by: INTERNAL MEDICINE

## 2023-12-11 PROCEDURE — 99214 OFFICE O/P EST MOD 30 MIN: CPT | Performed by: INTERNAL MEDICINE

## 2023-12-11 PROCEDURE — 83036 HEMOGLOBIN GLYCOSYLATED A1C: CPT | Performed by: INTERNAL MEDICINE

## 2023-12-11 PROCEDURE — 82043 UR ALBUMIN QUANTITATIVE: CPT | Performed by: INTERNAL MEDICINE

## 2023-12-11 PROCEDURE — 80061 LIPID PANEL: CPT | Performed by: INTERNAL MEDICINE

## 2023-12-11 PROCEDURE — 80050 GENERAL HEALTH PANEL: CPT | Performed by: INTERNAL MEDICINE

## 2023-12-11 PROCEDURE — 82607 VITAMIN B-12: CPT | Performed by: INTERNAL MEDICINE

## 2023-12-11 NOTE — PROGRESS NOTES
"Chief Complaint   Patient presents with    Diabetes     Follow up     HPI:   Sophie Medina is a 63 y.o.female who returns to endocrine clinic for f/u evaluation of pt's Type 2 DM and hyperlipidemia. Last visit 06/07/2023. Her history is as follows:    Interim Events:  - Has been having a pins and needles sensation in her left foot, begins in the buttock and travels down her leg  - Has been taking her medications as directed  - pt stopped famotidine. She stated she no longer needed the medication    1) Type 2 DM with no known associated complications at this time:  - diagnosed in 2014 during routine evaluation. Presented with c/o polyuria  - pt states she did not tolerate the ER metformin prescribed in 8/2016 - said it caused \"low blood sugars\" and \"dizziness\"  - was on low dose metformin, Had pt go off medication in 08/2019 to see if she could manage with dietary changes alone.     Current DM Medications:  - metformin 500 mg daily. Denies missed doses    Glucometer Review: no meter for today    DM Health Maintenance:  Ophtho: Last exam 01/2023 - no retinopathy per note. On plaquenil for RA  Podiatry: N/A  Monofilament / foot exam: 11/2021  Lipids: (12/2023) Tchol 159, TG 49, HDL 50, LDL 99 - on atorvastatin 20 mg daily  Urine Microalb/Cr ratio: (12/2023) normal   TSH: (12/2023) 1.110 - normal  CMP: (12/2023) Cr 1.10, eGFR 56.9, LFTs WNL  CBC: (12/2023) Hgb 13.4  Vit B12: (12/2023) 1,181 - is not on any type of supplement    2) mixed hyperlipidemia:  - tolerating atorvastatin 20 mg daily  Lipids: (12/2023) Tchol 159, TG 49, HDL 50, LDL 99 - on atorvastatin 20 mg daily      Other medical history:   - h/o RA, on plaquenil. Followed at [facility]     Review of Systems   Constitutional:  Negative for fatigue.        Wt stable   HENT: Negative.  Negative for congestion and rhinorrhea.    Eyes:  Negative for pain, redness, itching and visual disturbance.   Respiratory:  Positive for wheezing (occasional). Negative for cough " "and shortness of breath.    Cardiovascular: Negative.  Negative for chest pain.   Gastrointestinal:         Frequent heart burn, see HPI   Endocrine: Negative.  Negative for polydipsia, polyphagia and polyuria.   Genitourinary:  Negative for frequency and urgency.   Musculoskeletal:  Positive for arthralgias. Negative for joint swelling and myalgias.   Skin: Negative.    Neurological: Negative.  Negative for dizziness and headaches.   Hematological: Negative.    Psychiatric/Behavioral:  Positive for sleep disturbance.      The following portions of the patient's history were reviewed and updated as appropriate: allergies, current medications, past family history, past medical history, past social history, past surgical history and problem list.      /82   Pulse 77   Ht 157.5 cm (62\")   Wt 92.1 kg (203 lb)   SpO2 97%   BMI 37.13 kg/m²   Physical Exam   Constitutional: She is oriented to person, place, and time. She appears well-developed. No distress.   HENT:   Head: Normocephalic.   Eyes: Pupils are equal, round, and reactive to light. Conjunctivae are normal.   Neck: No thyromegaly present.   No palpable thyroid nodules     Cardiovascular: Normal rate, regular rhythm and normal heart sounds.   No murmur heard.  Pulmonary/Chest: Effort normal and breath sounds normal. She has no wheezes.   Abdominal: Soft. Normal appearance and bowel sounds are normal. She exhibits no mass. There is no abdominal tenderness.   Lymphadenopathy:     She has no cervical adenopathy.   Neurological: She is alert and oriented to person, place, and time. No cranial nerve deficit.   Skin: Skin is warm. No rash noted. No erythema.   Acanthosis nigricans   Psychiatric: Her behavior is normal.   Vitals reviewed.      LABS/IMAGING: outside records reviewed and summarized in HPI  Results for orders placed or performed in visit on 12/11/23   CBC (No Diff)    Specimen: Blood   Result Value Ref Range    WBC 6.80 3.40 - 10.80 10*3/mm3    " RBC 4.87 3.77 - 5.28 10*6/mm3    Hemoglobin 13.4 12.0 - 15.9 g/dL    Hematocrit 41.2 34.0 - 46.6 %    MCV 84.6 79.0 - 97.0 fL    MCH 27.5 26.6 - 33.0 pg    MCHC 32.5 31.5 - 35.7 g/dL    RDW 12.2 (L) 12.3 - 15.4 %    RDW-SD 36.3 (L) 37.0 - 54.0 fl    MPV 9.6 6.0 - 12.0 fL    Platelets 326 140 - 450 10*3/mm3   Comprehensive Metabolic Panel    Specimen: Blood   Result Value Ref Range    Glucose 86 65 - 99 mg/dL    BUN 12 8 - 23 mg/dL    Creatinine 1.10 (H) 0.57 - 1.00 mg/dL    Sodium 137 136 - 145 mmol/L    Potassium 3.9 3.5 - 5.2 mmol/L    Chloride 101 98 - 107 mmol/L    CO2 26.0 22.0 - 29.0 mmol/L    Calcium 9.8 8.6 - 10.5 mg/dL    Total Protein 7.4 6.0 - 8.5 g/dL    Albumin 4.8 3.5 - 5.2 g/dL    ALT (SGPT) 17 1 - 33 U/L    AST (SGOT) 21 1 - 32 U/L    Alkaline Phosphatase 109 39 - 117 U/L    Total Bilirubin 0.4 0.0 - 1.2 mg/dL    Globulin 2.6 gm/dL    A/G Ratio 1.8 g/dL    BUN/Creatinine Ratio 10.9 7.0 - 25.0    Anion Gap 10.0 5.0 - 15.0 mmol/L    eGFR 56.9 (L) >60.0 mL/min/1.73   Lipid Panel    Specimen: Blood   Result Value Ref Range    Total Cholesterol 159 0 - 200 mg/dL    Triglycerides 49 0 - 150 mg/dL    HDL Cholesterol 50 40 - 60 mg/dL    LDL Cholesterol  99 0 - 100 mg/dL    VLDL Cholesterol 10 5 - 40 mg/dL    LDL/HDL Ratio 1.98    Microalbumin / Creatinine Urine Ratio - Urine, Clean Catch    Specimen: Urine, Clean Catch   Result Value Ref Range    Microalbumin/Creatinine Ratio      Creatinine, Urine 165.8 mg/dL    Microalbumin, Urine <1.2 mg/dL   TSH    Specimen: Blood   Result Value Ref Range    TSH 1.110 0.270 - 4.200 uIU/mL   Vitamin B12    Specimen: Arm, Left; Blood   Result Value Ref Range    Vitamin B-12 1,181 (H) 211 - 946 pg/mL   POC Glycosylated Hemoglobin (Hb A1C)    Specimen: Blood   Result Value Ref Range    Hemoglobin A1C 6.3 (A) 4.5 - 5.7 %    Lot Number [id number]     Expiration Date 9/18/25    POC Glucose, Blood    Specimen: Blood   Result Value Ref Range    Glucose 87 70 - 130 mg/dL      ASSESSMENT/PLAN:  1) Type 2 DM with no associated complications at this time: controlled, her A1C% is 6.30% today and was 6.30% last visit.  - Reviewed treatment options.   - continue metformin 500 mg daily. Advised pt to take in AM.  - counseled pt on carb consistent meal planning    DM health maintenance labs checked today and reviewed. Has mild decreased in her estimated eGFR, however overall stable since 2022     2) mixed hyperlipidemia: controlled  Lipids: (12/2023) Tchol 159, TG 49, HDL 50, LDL 99 - on atorvastatin 20 mg daily  - Counseled pt that a statin is recommended for primary prevention of CVD  - continue atorvastatin 20 mg daily    3) CKD: has mild decreased in her estimated eGFR, however overall stable since 2022   Creatinine   Latest Ref Rng 0.57 - 1.00 mg/dL   4/10/2022 1.34 (H)    5/31/2022 6/18/2022 0.90    12/5/2022 1.15 (H)    6/7/2023 12/11/2023 1.10 (H)        eGFR   Latest Ref Rng >60.0 mL/min/1.73   4/10/2022 45.5 (L)    5/31/2022 6/18/2022 12/5/2022 54.3 (L)    6/7/2023 12/11/2023 56.9 (L)       RTC 6 months    Electronically Signed: Nicki Ayala MD

## 2023-12-12 LAB
ALBUMIN SERPL-MCNC: 4.8 G/DL (ref 3.5–5.2)
ALBUMIN UR-MCNC: <1.2 MG/DL
ALBUMIN/GLOB SERPL: 1.8 G/DL
ALP SERPL-CCNC: 109 U/L (ref 39–117)
ALT SERPL W P-5'-P-CCNC: 17 U/L (ref 1–33)
ANION GAP SERPL CALCULATED.3IONS-SCNC: 10 MMOL/L (ref 5–15)
AST SERPL-CCNC: 21 U/L (ref 1–32)
BILIRUB SERPL-MCNC: 0.4 MG/DL (ref 0–1.2)
BUN SERPL-MCNC: 12 MG/DL (ref 8–23)
BUN/CREAT SERPL: 10.9 (ref 7–25)
CALCIUM SPEC-SCNC: 9.8 MG/DL (ref 8.6–10.5)
CHLORIDE SERPL-SCNC: 101 MMOL/L (ref 98–107)
CHOLEST SERPL-MCNC: 159 MG/DL (ref 0–200)
CO2 SERPL-SCNC: 26 MMOL/L (ref 22–29)
CREAT SERPL-MCNC: 1.1 MG/DL (ref 0.57–1)
CREAT UR-MCNC: 165.8 MG/DL
DEPRECATED RDW RBC AUTO: 36.3 FL (ref 37–54)
EGFRCR SERPLBLD CKD-EPI 2021: 56.9 ML/MIN/1.73
ERYTHROCYTE [DISTWIDTH] IN BLOOD BY AUTOMATED COUNT: 12.2 % (ref 12.3–15.4)
GLOBULIN UR ELPH-MCNC: 2.6 GM/DL
GLUCOSE SERPL-MCNC: 86 MG/DL (ref 65–99)
HCT VFR BLD AUTO: 41.2 % (ref 34–46.6)
HDLC SERPL-MCNC: 50 MG/DL (ref 40–60)
HGB BLD-MCNC: 13.4 G/DL (ref 12–15.9)
LDLC SERPL CALC-MCNC: 99 MG/DL (ref 0–100)
LDLC/HDLC SERPL: 1.98 {RATIO}
MCH RBC QN AUTO: 27.5 PG (ref 26.6–33)
MCHC RBC AUTO-ENTMCNC: 32.5 G/DL (ref 31.5–35.7)
MCV RBC AUTO: 84.6 FL (ref 79–97)
MICROALBUMIN/CREAT UR: NORMAL MG/G{CREAT}
PLATELET # BLD AUTO: 326 10*3/MM3 (ref 140–450)
PMV BLD AUTO: 9.6 FL (ref 6–12)
POTASSIUM SERPL-SCNC: 3.9 MMOL/L (ref 3.5–5.2)
PROT SERPL-MCNC: 7.4 G/DL (ref 6–8.5)
RBC # BLD AUTO: 4.87 10*6/MM3 (ref 3.77–5.28)
SODIUM SERPL-SCNC: 137 MMOL/L (ref 136–145)
TRIGL SERPL-MCNC: 49 MG/DL (ref 0–150)
TSH SERPL DL<=0.05 MIU/L-ACNC: 1.11 UIU/ML (ref 0.27–4.2)
VIT B12 BLD-MCNC: 1181 PG/ML (ref 211–946)
VLDLC SERPL-MCNC: 10 MG/DL (ref 5–40)
WBC NRBC COR # BLD AUTO: 6.8 10*3/MM3 (ref 3.4–10.8)

## 2024-03-08 ENCOUNTER — HOSPITAL ENCOUNTER (EMERGENCY)
Facility: HOSPITAL | Age: 64
Discharge: HOME | End: 2024-03-08
Attending: EMERGENCY MEDICINE
Payer: COMMERCIAL

## 2024-03-08 ENCOUNTER — APPOINTMENT (OUTPATIENT)
Dept: GENERAL RADIOLOGY | Facility: HOSPITAL | Age: 64
End: 2024-03-08
Payer: COMMERCIAL

## 2024-03-08 VITALS
OXYGEN SATURATION: 94 % | RESPIRATION RATE: 18 BRPM | WEIGHT: 208 LBS | SYSTOLIC BLOOD PRESSURE: 120 MMHG | HEIGHT: 62 IN | DIASTOLIC BLOOD PRESSURE: 75 MMHG | HEART RATE: 84 BPM | TEMPERATURE: 98.6 F | BODY MASS INDEX: 38.28 KG/M2

## 2024-03-08 DIAGNOSIS — R06.02 SHORTNESS OF BREATH: ICD-10-CM

## 2024-03-08 DIAGNOSIS — B34.9 ACUTE VIRAL SYNDROME: Primary | ICD-10-CM

## 2024-03-08 LAB
ALBUMIN SERPL-MCNC: 4.4 G/DL (ref 3.5–5.2)
ALBUMIN/GLOB SERPL: 1.3 G/DL
ALP SERPL-CCNC: 104 U/L (ref 39–117)
ALT SERPL W P-5'-P-CCNC: 18 U/L (ref 1–33)
ANION GAP SERPL CALCULATED.3IONS-SCNC: 11 MMOL/L (ref 5–15)
AST SERPL-CCNC: 23 U/L (ref 1–32)
BASOPHILS # BLD AUTO: 0.04 10*3/MM3 (ref 0–0.2)
BASOPHILS NFR BLD AUTO: 0.4 % (ref 0–1.5)
BILIRUB SERPL-MCNC: 0.5 MG/DL (ref 0–1.2)
BUN SERPL-MCNC: 11 MG/DL (ref 8–23)
BUN/CREAT SERPL: 11 (ref 7–25)
CALCIUM SPEC-SCNC: 9.7 MG/DL (ref 8.6–10.5)
CHLORIDE SERPL-SCNC: 102 MMOL/L (ref 98–107)
CO2 SERPL-SCNC: 25 MMOL/L (ref 22–29)
CREAT SERPL-MCNC: 1 MG/DL (ref 0.57–1)
D DIMER PPP FEU-MCNC: 0.32 MCGFEU/ML (ref 0–0.62)
DEPRECATED RDW RBC AUTO: 41.2 FL (ref 37–54)
EGFRCR SERPLBLD CKD-EPI 2021: 63.8 ML/MIN/1.73
EOSINOPHIL # BLD AUTO: 0.16 10*3/MM3 (ref 0–0.4)
EOSINOPHIL NFR BLD AUTO: 1.6 % (ref 0.3–6.2)
ERYTHROCYTE [DISTWIDTH] IN BLOOD BY AUTOMATED COUNT: 12.7 % (ref 12.3–15.4)
FLUAV RNA RESP QL NAA+PROBE: NOT DETECTED
FLUBV RNA RESP QL NAA+PROBE: NOT DETECTED
GEN 5 2HR TROPONIN T REFLEX: 6 NG/L
GLOBULIN UR ELPH-MCNC: 3.5 GM/DL
GLUCOSE SERPL-MCNC: 110 MG/DL (ref 65–99)
HCT VFR BLD AUTO: 40.2 % (ref 34–46.6)
HGB BLD-MCNC: 13.2 G/DL (ref 12–15.9)
HOLD SPECIMEN: NORMAL
IMM GRANULOCYTES # BLD AUTO: 0.02 10*3/MM3 (ref 0–0.05)
IMM GRANULOCYTES NFR BLD AUTO: 0.2 % (ref 0–0.5)
LIPASE SERPL-CCNC: 16 U/L (ref 13–60)
LYMPHOCYTES # BLD AUTO: 1.55 10*3/MM3 (ref 0.7–3.1)
LYMPHOCYTES NFR BLD AUTO: 15.7 % (ref 19.6–45.3)
MCH RBC QN AUTO: 29.1 PG (ref 26.6–33)
MCHC RBC AUTO-ENTMCNC: 32.8 G/DL (ref 31.5–35.7)
MCV RBC AUTO: 88.7 FL (ref 79–97)
MONOCYTES # BLD AUTO: 0.85 10*3/MM3 (ref 0.1–0.9)
MONOCYTES NFR BLD AUTO: 8.6 % (ref 5–12)
NEUTROPHILS NFR BLD AUTO: 7.28 10*3/MM3 (ref 1.7–7)
NEUTROPHILS NFR BLD AUTO: 73.5 % (ref 42.7–76)
NRBC BLD AUTO-RTO: 0 /100 WBC (ref 0–0.2)
NT-PROBNP SERPL-MCNC: <36 PG/ML (ref 0–900)
PLATELET # BLD AUTO: 261 10*3/MM3 (ref 140–450)
PMV BLD AUTO: 10 FL (ref 6–12)
POTASSIUM SERPL-SCNC: 4 MMOL/L (ref 3.5–5.2)
PROT SERPL-MCNC: 7.9 G/DL (ref 6–8.5)
QT INTERVAL: 366 MS
QTC INTERVAL: 435 MS
RBC # BLD AUTO: 4.53 10*6/MM3 (ref 3.77–5.28)
SARS-COV-2 RNA RESP QL NAA+PROBE: NOT DETECTED
SODIUM SERPL-SCNC: 138 MMOL/L (ref 136–145)
TROPONIN T DELTA: 0 NG/L
TROPONIN T SERPL HS-MCNC: 6 NG/L
WBC NRBC COR # BLD AUTO: 9.9 10*3/MM3 (ref 3.4–10.8)
WHOLE BLOOD HOLD COAG: NORMAL
WHOLE BLOOD HOLD SPECIMEN: NORMAL

## 2024-03-08 PROCEDURE — 87636 SARSCOV2 & INF A&B AMP PRB: CPT | Performed by: EMERGENCY MEDICINE

## 2024-03-08 PROCEDURE — 96374 THER/PROPH/DIAG INJ IV PUSH: CPT

## 2024-03-08 PROCEDURE — 84484 ASSAY OF TROPONIN QUANT: CPT

## 2024-03-08 PROCEDURE — 93005 ELECTROCARDIOGRAM TRACING: CPT

## 2024-03-08 PROCEDURE — 80053 COMPREHEN METABOLIC PANEL: CPT

## 2024-03-08 PROCEDURE — 83690 ASSAY OF LIPASE: CPT

## 2024-03-08 PROCEDURE — 85379 FIBRIN DEGRADATION QUANT: CPT | Performed by: EMERGENCY MEDICINE

## 2024-03-08 PROCEDURE — 25010000002 KETOROLAC TROMETHAMINE PER 15 MG: Performed by: EMERGENCY MEDICINE

## 2024-03-08 PROCEDURE — 83880 ASSAY OF NATRIURETIC PEPTIDE: CPT

## 2024-03-08 PROCEDURE — 71045 X-RAY EXAM CHEST 1 VIEW: CPT

## 2024-03-08 PROCEDURE — 99284 EMERGENCY DEPT VISIT MOD MDM: CPT

## 2024-03-08 PROCEDURE — 85025 COMPLETE CBC W/AUTO DIFF WBC: CPT

## 2024-03-08 PROCEDURE — 36415 COLL VENOUS BLD VENIPUNCTURE: CPT

## 2024-03-08 RX ORDER — KETOROLAC TROMETHAMINE 15 MG/ML
15 INJECTION, SOLUTION INTRAMUSCULAR; INTRAVENOUS ONCE
Status: COMPLETED | OUTPATIENT
Start: 2024-03-08 | End: 2024-03-08

## 2024-03-08 RX ORDER — ASPIRIN 81 MG/1
324 TABLET, CHEWABLE ORAL ONCE
Status: DISCONTINUED | OUTPATIENT
Start: 2024-03-08 | End: 2024-03-08 | Stop reason: HOSPADM

## 2024-03-08 RX ORDER — SODIUM CHLORIDE 0.9 % (FLUSH) 0.9 %
10 SYRINGE (ML) INJECTION AS NEEDED
Status: DISCONTINUED | OUTPATIENT
Start: 2024-03-08 | End: 2024-03-08 | Stop reason: HOSPADM

## 2024-03-08 RX ADMIN — KETOROLAC TROMETHAMINE 15 MG: 15 INJECTION, SOLUTION INTRAMUSCULAR; INTRAVENOUS at 03:18

## 2024-03-08 NOTE — ED PROVIDER NOTES
Subjective   History of Present Illness  Patient presents for evaluation of multiple symptoms.  For about 2 weeks she has been having a cough.  Over the past few days she has also had a bifrontal headache.  2 days ago she started having chest pain across her bilateral upper chest and a pressure-like sensation.  Today in the middle afternoon that pain worsened in her chest which she has never had before and so she came to the ER for further evaluation.  No fevers or chills.  She has some difficulty breathing.  She has noticed a little bit of swelling in her bilateral lower extremities over the past few days reports that tonight it does not seem to be as bad.    History provided by:  Patient      Review of Systems    Past Medical History:   Diagnosis Date    Asthma     DM (diabetes mellitus), type 2, uncontrolled     Hyperlipidemia     Rheumatoid arthritis        Allergies   Allergen Reactions    Bactrim [Sulfamethoxazole-Trimethoprim] Rash       Past Surgical History:   Procedure Laterality Date    HYSTERECTOMY  2014       Family History   Problem Relation Age of Onset    Heart attack Mother 84    Hypertension Mother     Aneurysm Mother     Arthritis Father     Diabetes Father     Hypertension Father     Dementia Father     Diabetes Maternal Grandmother     No Known Problems Maternal Grandfather     No Known Problems Paternal Grandmother     No Known Problems Paternal Grandfather        Social History     Socioeconomic History    Marital status:    Tobacco Use    Smoking status: Former     Current packs/day: 0.00     Average packs/day: 0.5 packs/day for 2.0 years (1.0 ttl pk-yrs)     Types: Cigarettes     Start date: 10/19/1995     Quit date: 10/19/1997     Years since quittin.4    Smokeless tobacco: Never   Vaping Use    Vaping status: Never Used   Substance and Sexual Activity    Alcohol use: No    Drug use: No    Sexual activity: Defer           Objective   Physical Exam  Constitutional:        General: She is not in acute distress.  HENT:      Head: Normocephalic and atraumatic.   Eyes:      Conjunctiva/sclera: Conjunctivae normal.      Pupils: Pupils are equal, round, and reactive to light.   Cardiovascular:      Rate and Rhythm: Normal rate and regular rhythm.      Pulses: Normal pulses.      Heart sounds: No murmur heard.     No gallop.   Pulmonary:      Effort: Pulmonary effort is normal. No respiratory distress.      Breath sounds: No wheezing or rales.   Abdominal:      General: Abdomen is flat. There is no distension.      Tenderness: There is no abdominal tenderness.   Musculoskeletal:         General: No swelling or deformity. Normal range of motion.   Skin:     General: Skin is warm and dry.      Capillary Refill: Capillary refill takes less than 2 seconds.   Neurological:      General: No focal deficit present.      Mental Status: She is alert and oriented to person, place, and time.   Psychiatric:         Mood and Affect: Mood normal.         Behavior: Behavior normal.         Procedures           ED Course  ED Course as of 03/08/24 0518   Fri Mar 08, 2024   0116 Twelve-lead ECG independently interpreted by myself demonstrates normal sinus rhythm, no ST segment elevation or depression, T wave inversion in lead III. [KB]   0238 Chest x-ray independently interpreted by myself demonstrates no acute cardiopulmonary abnormality [KB]      ED Course User Index  [KB] Michele Leiva MD                                             Medical Decision Making  Differential diagnosis includes bronchitis, viral respiratory infection, pneumonia, pneumothorax, pulmonary embolism, acute coronary syndrome.  Lab and imaging studies were conducted.    Patient was reassessed.  Continues to be well-appearing, no respiratory distress or hypoxia.  Ultimately a clear diagnosis is not reached in the emergency department though I believe we have adequately evaluated the patient for emergent pathology.  He was recommended  she follow-up with her primary doctor, she was discharged from the ER in good condition    Problems Addressed:  Acute viral syndrome: complicated acute illness or injury    Amount and/or Complexity of Data Reviewed  External Data Reviewed: notes.     Details: 10/19/2018 reviewed most recent stress test demonstrating evidence of a low risk study.  Labs: ordered.     Details: Laboratory workup independently interpreted by myself demonstrates no acute abnormality.  D-dimer negative, serial troponins within normal limits  Radiology: ordered and independent interpretation performed. Decision-making details documented in ED Course.  ECG/medicine tests: ordered and independent interpretation performed. Decision-making details documented in ED Course.    Risk  OTC drugs.  Prescription drug management.  Decision regarding hospitalization.        Final diagnoses:   Acute viral syndrome   Shortness of breath       ED Disposition  ED Disposition       ED Disposition   Discharge    Condition   Stable    Comment   --             Recent Results (from the past 24 hour(s))   ECG 12 Lead ED Triage Standing Order; Chest Pain    Collection Time: 03/08/24  1:11 AM   Result Value Ref Range    QT Interval 366 ms    QTC Interval 435 ms   High Sensitivity Troponin T    Collection Time: 03/08/24  1:51 AM    Specimen: Blood   Result Value Ref Range    HS Troponin T 6 <14 ng/L   Comprehensive Metabolic Panel    Collection Time: 03/08/24  1:51 AM    Specimen: Blood   Result Value Ref Range    Glucose 110 (H) 65 - 99 mg/dL    BUN 11 8 - 23 mg/dL    Creatinine 1.00 0.57 - 1.00 mg/dL    Sodium 138 136 - 145 mmol/L    Potassium 4.0 3.5 - 5.2 mmol/L    Chloride 102 98 - 107 mmol/L    CO2 25.0 22.0 - 29.0 mmol/L    Calcium 9.7 8.6 - 10.5 mg/dL    Total Protein 7.9 6.0 - 8.5 g/dL    Albumin 4.4 3.5 - 5.2 g/dL    ALT (SGPT) 18 1 - 33 U/L    AST (SGOT) 23 1 - 32 U/L    Alkaline Phosphatase 104 39 - 117 U/L    Total Bilirubin 0.5 0.0 - 1.2 mg/dL     Globulin 3.5 gm/dL    A/G Ratio 1.3 g/dL    BUN/Creatinine Ratio 11.0 7.0 - 25.0    Anion Gap 11.0 5.0 - 15.0 mmol/L    eGFR 63.8 >60.0 mL/min/1.73   Lipase    Collection Time: 03/08/24  1:51 AM    Specimen: Blood   Result Value Ref Range    Lipase 16 13 - 60 U/L   BNP    Collection Time: 03/08/24  1:51 AM    Specimen: Blood   Result Value Ref Range    proBNP <36.0 0.0 - 900.0 pg/mL   Green Top (Gel)    Collection Time: 03/08/24  1:51 AM   Result Value Ref Range    Extra Tube Hold for add-ons.    Lavender Top    Collection Time: 03/08/24  1:51 AM   Result Value Ref Range    Extra Tube hold for add-on    Gold Top - SST    Collection Time: 03/08/24  1:51 AM   Result Value Ref Range    Extra Tube Hold for add-ons.    Light Blue Top    Collection Time: 03/08/24  1:51 AM   Result Value Ref Range    Extra Tube Hold for add-ons.    CBC Auto Differential    Collection Time: 03/08/24  1:51 AM    Specimen: Blood   Result Value Ref Range    WBC 9.90 3.40 - 10.80 10*3/mm3    RBC 4.53 3.77 - 5.28 10*6/mm3    Hemoglobin 13.2 12.0 - 15.9 g/dL    Hematocrit 40.2 34.0 - 46.6 %    MCV 88.7 79.0 - 97.0 fL    MCH 29.1 26.6 - 33.0 pg    MCHC 32.8 31.5 - 35.7 g/dL    RDW 12.7 12.3 - 15.4 %    RDW-SD 41.2 37.0 - 54.0 fl    MPV 10.0 6.0 - 12.0 fL    Platelets 261 140 - 450 10*3/mm3    Neutrophil % 73.5 42.7 - 76.0 %    Lymphocyte % 15.7 (L) 19.6 - 45.3 %    Monocyte % 8.6 5.0 - 12.0 %    Eosinophil % 1.6 0.3 - 6.2 %    Basophil % 0.4 0.0 - 1.5 %    Immature Grans % 0.2 0.0 - 0.5 %    Neutrophils, Absolute 7.28 (H) 1.70 - 7.00 10*3/mm3    Lymphocytes, Absolute 1.55 0.70 - 3.10 10*3/mm3    Monocytes, Absolute 0.85 0.10 - 0.90 10*3/mm3    Eosinophils, Absolute 0.16 0.00 - 0.40 10*3/mm3    Basophils, Absolute 0.04 0.00 - 0.20 10*3/mm3    Immature Grans, Absolute 0.02 0.00 - 0.05 10*3/mm3    nRBC 0.0 0.0 - 0.2 /100 WBC   D-dimer, Quantitative    Collection Time: 03/08/24  1:51 AM    Specimen: Blood   Result Value Ref Range    D-Dimer,  Quantitative 0.32 0.00 - 0.62 MCGFEU/mL   COVID-19 and FLU A/B PCR, 1 HR TAT - Swab, Nasopharynx    Collection Time: 03/08/24  1:52 AM    Specimen: Nasopharynx; Swab   Result Value Ref Range    COVID19 Not Detected Not Detected - Ref. Range    Influenza A PCR Not Detected Not Detected    Influenza B PCR Not Detected Not Detected   High Sensitivity Troponin T 2Hr    Collection Time: 03/08/24  4:31 AM    Specimen: Blood   Result Value Ref Range    HS Troponin T 6 <14 ng/L    Troponin T Delta 0 >=-4 - <+4 ng/L     Note: In addition to lab results from this visit, the labs listed above may include labs taken at another facility or during a different encounter within the last 24 hours. Please correlate lab times with ED admission and discharge times for further clarification of the services performed during this visit.    XR Chest 1 View   Final Result   Impression:   No acute cardiopulmonary abnormality.            Electronically Signed: Jose Adames MD     3/8/2024 1:38 AM EST     Workstation ID: [id number]        Vitals:    03/08/24 0414 03/08/24 0419 03/08/24 0424 03/08/24 0429   BP:       BP Location:       Patient Position:       Pulse: 80 79 80 84   Resp:       Temp:       TempSrc:       SpO2: 94% 94% 96% 94%   Weight:       Height:         Medications   sodium chloride 0.9 % flush 10 mL (has no administration in time range)   aspirin chewable tablet 324 mg (324 mg Oral Not Given 3/8/24 0158)   ketorolac (TORADOL) injection 15 mg (15 mg Intravenous Given 3/8/24 0318)     ECG/EMG Results (last 24 hours)       Procedure Component Value Units Date/Time    ECG 12 Lead ED Triage Standing Order; Chest Pain [[id number]] Collected: 03/08/24 0111     Updated: 03/08/24 0116     QT Interval 366 ms      QTC Interval 435 ms     Narrative:      Test Reason : ED Triage Standing Order~  Blood Pressure :   */*   mmHG  Vent. Rate :  85 BPM     Atrial Rate :  85 BPM     P-R Int : 162 ms          QRS Dur :  76 ms      QT Int : 366 ms        P-R-T Axes :  51 -17  17 degrees     QTc Int : 435 ms    Normal sinus rhythm  Cannot rule out Anterior infarct , age undetermined  Abnormal ECG  When compared with ECG of 17-OCT-2018 18:27,  No significant change was found  Confirmed by MD RIVERA KYLE ([id number]) on 3/8/2024 1:16:34 AM    Referred By:            Confirmed By: OMI RIVERA MD          ECG 12 Lead ED Triage Standing Order; Chest Pain   Final Result   Test Reason : ED Triage Standing Order~   Blood Pressure :   */*   mmHG   Vent. Rate :  85 BPM     Atrial Rate :  85 BPM      P-R Int : 162 ms          QRS Dur :  76 ms       QT Int : 366 ms       P-R-T Axes :  51 -17  17 degrees      QTc Int : 435 ms      Normal sinus rhythm   Cannot rule out Anterior infarct , age undetermined   Abnormal ECG   When compared with ECG of 17-OCT-2018 18:27,   No significant change was found   Confirmed by MD RIVERA KYLE ([id number]) on 3/8/2024 1:16:34 AM      Referred By:            Confirmed By: OMI RIVERA MD      ECG 12 Lead ED Triage Standing Order; Chest Pain    (Results Pending)           Chago Brandon MD  [address] [facility] [address] [phone]               Medication List      No changes were made to your prescriptions during this visit.            Omi Rivera MD  03/08/24 0537

## 2024-03-08 NOTE — DISCHARGE INSTRUCTIONS
I recommend you take Tylenol 650 mg every 6 hours and ibuprofen 400 mg every 6 hours as needed for pain unless you have a contraindication to these medications  Drink plenty of fluids to stay well-hydrated.  Call to schedule follow-up with your primary care doctor.  Return to the ER as needed for new or worsening symptoms

## 2024-04-14 DIAGNOSIS — E78.2 MIXED HYPERLIPIDEMIA: ICD-10-CM

## 2024-04-14 DIAGNOSIS — E11.9 CONTROLLED TYPE 2 DIABETES MELLITUS WITHOUT COMPLICATION, WITHOUT LONG-TERM CURRENT USE OF INSULIN: ICD-10-CM

## 2024-04-15 RX ORDER — ATORVASTATIN CALCIUM 20 MG/1
20 TABLET, FILM COATED ORAL DAILY
Qty: 90 TABLET | Refills: 3 | Status: SHIPPED | OUTPATIENT
Start: 2024-04-15

## 2024-04-15 NOTE — TELEPHONE ENCOUNTER
Rx Refill Note  Requested Prescriptions     Pending Prescriptions Disp Refills    atorvastatin (LIPITOR) 20 MG tablet [Pharmacy Med Name: ATORVASTATIN 20MG TABLETS] 90 tablet 3     Sig: TAKE 1 TABLET BY MOUTH DAILY    metFORMIN (GLUCOPHAGE) 500 MG tablet [Pharmacy Med Name: METFORMIN 500MG TABLETS] 90 tablet 3     Sig: TAKE 1 TABLET BY MOUTH DAILY WITH BREAKFAST      Last office visit with prescribing clinician: 12/11/2023   Last telemedicine visit with prescribing clinician: Visit date not found   Next office visit with prescribing clinician: 6/11/2024                         Would you like a call back once the refill request has been completed: [] Yes [] No    If the office needs to give you a call back, can they leave a voicemail: [] Yes [] No    Pita Wade MA  04/15/24, 08:22 EDT

## 2024-06-11 ENCOUNTER — OFFICE VISIT (OUTPATIENT)
Dept: ENDOCRINOLOGY | Facility: CLINIC | Age: 64
End: 2024-06-11
Payer: COMMERCIAL

## 2024-06-11 VITALS
SYSTOLIC BLOOD PRESSURE: 126 MMHG | BODY MASS INDEX: 38.28 KG/M2 | HEART RATE: 91 BPM | DIASTOLIC BLOOD PRESSURE: 80 MMHG | HEIGHT: 62 IN | OXYGEN SATURATION: 98 % | WEIGHT: 208 LBS

## 2024-06-11 DIAGNOSIS — E11.9 TYPE 2 DIABETES MELLITUS WITHOUT COMPLICATION, WITHOUT LONG-TERM CURRENT USE OF INSULIN: Primary | ICD-10-CM

## 2024-06-11 DIAGNOSIS — E78.2 MIXED HYPERLIPIDEMIA: ICD-10-CM

## 2024-06-11 LAB
ALBUMIN SERPL-MCNC: 4.4 G/DL (ref 3.5–5.2)
ALBUMIN/GLOB SERPL: 1.5 G/DL
ALP SERPL-CCNC: 98 U/L (ref 39–117)
ALT SERPL W P-5'-P-CCNC: 15 U/L (ref 1–33)
ANION GAP SERPL CALCULATED.3IONS-SCNC: 8 MMOL/L (ref 5–15)
AST SERPL-CCNC: 15 U/L (ref 1–32)
BILIRUB SERPL-MCNC: 0.5 MG/DL (ref 0–1.2)
BUN SERPL-MCNC: 9 MG/DL (ref 8–23)
BUN/CREAT SERPL: 9.1 (ref 7–25)
CALCIUM SPEC-SCNC: 9.4 MG/DL (ref 8.6–10.5)
CHLORIDE SERPL-SCNC: 103 MMOL/L (ref 98–107)
CO2 SERPL-SCNC: 27 MMOL/L (ref 22–29)
CREAT SERPL-MCNC: 0.99 MG/DL (ref 0.57–1)
DEPRECATED RDW RBC AUTO: 38.5 FL (ref 37–54)
EGFRCR SERPLBLD CKD-EPI 2021: 64.6 ML/MIN/1.73
ERYTHROCYTE [DISTWIDTH] IN BLOOD BY AUTOMATED COUNT: 12.3 % (ref 12.3–15.4)
EXPIRATION DATE: ABNORMAL
EXPIRATION DATE: NORMAL
GLOBULIN UR ELPH-MCNC: 2.9 GM/DL
GLUCOSE BLDC GLUCOMTR-MCNC: 116 MG/DL (ref 70–130)
GLUCOSE SERPL-MCNC: 97 MG/DL (ref 65–99)
HBA1C MFR BLD: 6.6 % (ref 4.5–5.7)
HCT VFR BLD AUTO: 38.4 % (ref 34–46.6)
HGB BLD-MCNC: 12.9 G/DL (ref 12–15.9)
Lab: ABNORMAL
Lab: NORMAL
MCH RBC QN AUTO: 29.1 PG (ref 26.6–33)
MCHC RBC AUTO-ENTMCNC: 33.6 G/DL (ref 31.5–35.7)
MCV RBC AUTO: 86.5 FL (ref 79–97)
PLATELET # BLD AUTO: 266 10*3/MM3 (ref 140–450)
PMV BLD AUTO: 9.8 FL (ref 6–12)
POTASSIUM SERPL-SCNC: 3.9 MMOL/L (ref 3.5–5.2)
PROT SERPL-MCNC: 7.3 G/DL (ref 6–8.5)
RBC # BLD AUTO: 4.44 10*6/MM3 (ref 3.77–5.28)
SODIUM SERPL-SCNC: 138 MMOL/L (ref 136–145)
WBC NRBC COR # BLD AUTO: 6.8 10*3/MM3 (ref 3.4–10.8)

## 2024-06-11 PROCEDURE — 80050 GENERAL HEALTH PANEL: CPT | Performed by: INTERNAL MEDICINE

## 2024-06-11 PROCEDURE — 82607 VITAMIN B-12: CPT | Performed by: INTERNAL MEDICINE

## 2024-06-11 RX ORDER — METFORMIN HYDROCHLORIDE 500 MG/1
500 TABLET, EXTENDED RELEASE ORAL
Qty: 90 TABLET | Refills: 3 | Status: SHIPPED | OUTPATIENT
Start: 2024-06-11

## 2024-06-11 NOTE — PROGRESS NOTES
"Chief Complaint   Patient presents with    Diabetes     Follow-up     HPI:   Sophie Medina is a 63 y.o.female who returns to endocrine clinic for f/u evaluation of pt's Type 2 DM and hyperlipidemia. Last visit 12/11/2023. Her history is as follows:    Interim Events:  - followed by  Rheumatology for her RA.  Was treated with a prednisone taper in February 2024.  Currently on Plaquenil.  -Treated with another prednisone 5-day taper for asthma in April 2024  - currently with right arm swelling. Undergoing evaluation by orthopedic surgery at [facility]  - has been having episodes of dizziness. BP is normal when these episodes occur      1) Type 2 DM with no known associated complications at this time:  - diagnosed in 2014 during routine evaluation. Presented with c/o polyuria  - pt states she did not tolerate the ER metformin prescribed in 8/2016 - said it caused \"low blood sugars\" and \"dizziness\"  - was on low dose metformin, Had pt go off medication in 08/2019 to see if she could manage with dietary changes alone.     Current DM Medications:  - metformin 500 mg daily. Denies missed doses    Glucometer Review: no meter for today    DM Health Maintenance:  Ophtho: Last exam 01/2023 - no retinopathy per note. On plaquenil for RA  Podiatry: N/A  Monofilament / foot exam: 11/2021  Lipids: (12/2023) Tchol 159, TG 49, HDL 50, LDL 99 - on atorvastatin 20 mg daily  Urine Microalb/Cr ratio: (12/2023) normal   TSH: (06/2024) 1.340 - normal  CMP: (06/2024) Cr 0.99, eGFR 64.6, LFTs WNL  CBC: (06/2024) WNL, Hgb 12.9  Vit B12: (06/2024) 1,040     2) mixed hyperlipidemia:  - tolerating atorvastatin 20 mg daily  Lipids: (12/2023) Tchol 159, TG 49, HDL 50, LDL 99 - on atorvastatin 20 mg daily      Other medical history:   - h/o RA, on plaquenil. Followed at [facility]     Review of Systems   Constitutional:  Negative for fatigue.        Wt gain, mild   HENT: Negative.  Negative for congestion and rhinorrhea.    Eyes:  Negative for pain, " "redness, itching and visual disturbance.   Respiratory:  Positive for wheezing (occasional). Negative for cough and shortness of breath.    Cardiovascular: Negative.  Negative for chest pain.   Gastrointestinal:         Frequent heart burn, see HPI   Endocrine: Negative.  Negative for polydipsia, polyphagia and polyuria.   Genitourinary:  Negative for frequency and urgency.   Musculoskeletal:  Positive for arthralgias. Negative for joint swelling and myalgias.   Skin: Negative.    Neurological:  Positive for dizziness. Negative for headaches.   Hematological: Negative.    Psychiatric/Behavioral:  Positive for sleep disturbance.      The following portions of the patient's history were reviewed and updated as appropriate: allergies, current medications, past family history, past medical history, past social history, past surgical history and problem list.    /80   Pulse 91   Ht 157.5 cm (62.01\")   Wt 94.3 kg (208 lb)   SpO2 98%   BMI 38.03 kg/m²   Physical Exam   Constitutional: She is oriented to person, place, and time. She appears well-developed. No distress.   HENT:   Head: Normocephalic.   Eyes: Pupils are equal, round, and reactive to light. Conjunctivae are normal.   Neck: No thyromegaly present.   No palpable thyroid nodules     Cardiovascular: Normal rate, regular rhythm and normal heart sounds.   No murmur heard.  Pulmonary/Chest: Effort normal and breath sounds normal. She has no wheezes.   Abdominal: Soft. Normal appearance and bowel sounds are normal. She exhibits no mass. There is no abdominal tenderness.   Lymphadenopathy:     She has no cervical adenopathy.   Neurological: She is alert and oriented to person, place, and time. No cranial nerve deficit.   Skin: Skin is warm. No rash noted. No erythema.   Acanthosis nigricans   Psychiatric: Her behavior is normal.   Vitals reviewed.      LABS/IMAGING: outside records reviewed and summarized in HPI  Results for orders placed or performed in " visit on 06/11/24   CBC (No Diff)    Specimen: Blood   Result Value Ref Range    WBC 6.80 3.40 - 10.80 10*3/mm3    RBC 4.44 3.77 - 5.28 10*6/mm3    Hemoglobin 12.9 12.0 - 15.9 g/dL    Hematocrit 38.4 34.0 - 46.6 %    MCV 86.5 79.0 - 97.0 fL    MCH 29.1 26.6 - 33.0 pg    MCHC 33.6 31.5 - 35.7 g/dL    RDW 12.3 12.3 - 15.4 %    RDW-SD 38.5 37.0 - 54.0 fl    MPV 9.8 6.0 - 12.0 fL    Platelets 266 140 - 450 10*3/mm3   Comprehensive Metabolic Panel    Specimen: Blood   Result Value Ref Range    Glucose 97 65 - 99 mg/dL    BUN 9 8 - 23 mg/dL    Creatinine 0.99 0.57 - 1.00 mg/dL    Sodium 138 136 - 145 mmol/L    Potassium 3.9 3.5 - 5.2 mmol/L    Chloride 103 98 - 107 mmol/L    CO2 27.0 22.0 - 29.0 mmol/L    Calcium 9.4 8.6 - 10.5 mg/dL    Total Protein 7.3 6.0 - 8.5 g/dL    Albumin 4.4 3.5 - 5.2 g/dL    ALT (SGPT) 15 1 - 33 U/L    AST (SGOT) 15 1 - 32 U/L    Alkaline Phosphatase 98 39 - 117 U/L    Total Bilirubin 0.5 0.0 - 1.2 mg/dL    Globulin 2.9 gm/dL    A/G Ratio 1.5 g/dL    BUN/Creatinine Ratio 9.1 7.0 - 25.0    Anion Gap 8.0 5.0 - 15.0 mmol/L    eGFR 64.6 >60.0 mL/min/1.73   TSH    Specimen: Blood   Result Value Ref Range    TSH 1.340 0.270 - 4.200 uIU/mL   Vitamin B12    Specimen: Arm, Left; Blood   Result Value Ref Range    Vitamin B-12 1,040 (H) 211 - 946 pg/mL   POC Glucose, Blood    Specimen: Blood   Result Value Ref Range    Glucose 116 70 - 130 mg/dL    Lot Number [id number]     Expiration Date 11/10/2024    POC Glycosylated Hemoglobin (Hb A1C)    Specimen: Blood   Result Value Ref Range    Hemoglobin A1C 6.6 (A) 4.5 - 5.7 %    Lot Number [id number]     Expiration Date 2/12/2026      ASSESSMENT/PLAN:  1) Type 2 DM with no associated complications at this time: controlled, her A1C% is 6.60% today and was 6.30% last visit.  - Reviewed treatment options.   - changing plain metformin 500 mg daily to metformin  mg daily.   - counseled pt on carb consistent meal planning    DM health maintenance labs checked  today, 12/2023 reviewed.     2) mixed hyperlipidemia: controlled  Lipids: (12/2023) Tchol 159, TG 49, HDL 50, LDL 99 - on atorvastatin 20 mg daily  - Counseled pt that a statin is recommended for primary prevention of CVD  - continue atorvastatin 20 mg daily    RTC 6 months    Electronically Signed: Nicki Ayala MD

## 2024-06-12 LAB
TSH SERPL DL<=0.05 MIU/L-ACNC: 1.34 UIU/ML (ref 0.27–4.2)
VIT B12 BLD-MCNC: 1040 PG/ML (ref 211–946)

## 2024-11-23 ENCOUNTER — APPOINTMENT (OUTPATIENT)
Facility: HOSPITAL | Age: 64
End: 2024-11-23
Payer: COMMERCIAL

## 2024-11-23 ENCOUNTER — HOSPITAL ENCOUNTER (EMERGENCY)
Facility: HOSPITAL | Age: 64
Discharge: HOME | End: 2024-11-23
Attending: FAMILY MEDICINE
Payer: COMMERCIAL

## 2024-11-23 VITALS
OXYGEN SATURATION: 93 % | DIASTOLIC BLOOD PRESSURE: 100 MMHG | SYSTOLIC BLOOD PRESSURE: 147 MMHG | WEIGHT: 207 LBS | BODY MASS INDEX: 38.09 KG/M2 | TEMPERATURE: 98.3 F | RESPIRATION RATE: 18 BRPM | HEIGHT: 62 IN | HEART RATE: 85 BPM

## 2024-11-23 DIAGNOSIS — R10.9 LEFT FLANK PAIN, CHRONIC: Primary | ICD-10-CM

## 2024-11-23 DIAGNOSIS — G89.29 LEFT FLANK PAIN, CHRONIC: Primary | ICD-10-CM

## 2024-11-23 DIAGNOSIS — R07.89 ATYPICAL CHEST PAIN: ICD-10-CM

## 2024-11-23 LAB
ALBUMIN SERPL-MCNC: 4.1 G/DL (ref 3.5–5.2)
ALBUMIN/GLOB SERPL: 1.3 G/DL
ALP SERPL-CCNC: 118 U/L (ref 39–117)
ALT SERPL W P-5'-P-CCNC: 16 U/L (ref 1–33)
ANION GAP SERPL CALCULATED.3IONS-SCNC: 13.7 MMOL/L (ref 5–15)
AST SERPL-CCNC: 38 U/L (ref 1–32)
BASOPHILS # BLD AUTO: 0.02 10*3/MM3 (ref 0–0.2)
BASOPHILS NFR BLD AUTO: 0.2 % (ref 0–1.5)
BILIRUB SERPL-MCNC: 0.5 MG/DL (ref 0–1.2)
BILIRUB UR QL STRIP: NEGATIVE
BUN SERPL-MCNC: 8 MG/DL (ref 8–23)
BUN/CREAT SERPL: 8.7 (ref 7–25)
CALCIUM SPEC-SCNC: 9.4 MG/DL (ref 8.6–10.5)
CHLORIDE SERPL-SCNC: 106 MMOL/L (ref 98–107)
CLARITY UR: CLEAR
CO2 SERPL-SCNC: 21.3 MMOL/L (ref 22–29)
COLOR UR: YELLOW
CREAT SERPL-MCNC: 0.92 MG/DL (ref 0.57–1)
D DIMER PPP FEU-MCNC: 0.44 MCGFEU/ML (ref 0–0.63)
DEPRECATED RDW RBC AUTO: 41.6 FL (ref 37–54)
EGFRCR SERPLBLD CKD-EPI 2021: 70.1 ML/MIN/1.73
EOSINOPHIL # BLD AUTO: 0.22 10*3/MM3 (ref 0–0.4)
EOSINOPHIL NFR BLD AUTO: 2.7 % (ref 0.3–6.2)
ERYTHROCYTE [DISTWIDTH] IN BLOOD BY AUTOMATED COUNT: 12.7 % (ref 12.3–15.4)
GEN 5 2HR TROPONIN T REFLEX: 7 NG/L
GLOBULIN UR ELPH-MCNC: 3.2 GM/DL
GLUCOSE SERPL-MCNC: 99 MG/DL (ref 65–99)
GLUCOSE UR STRIP-MCNC: NEGATIVE MG/DL
HCT VFR BLD AUTO: 39.4 % (ref 34–46.6)
HGB BLD-MCNC: 12.6 G/DL (ref 12–15.9)
HGB UR QL STRIP.AUTO: NEGATIVE
HOLD SPECIMEN: NORMAL
IMM GRANULOCYTES # BLD AUTO: 0 10*3/MM3 (ref 0–0.05)
IMM GRANULOCYTES NFR BLD AUTO: 0 % (ref 0–0.5)
KETONES UR QL STRIP: NEGATIVE
LEUKOCYTE ESTERASE UR QL STRIP.AUTO: NEGATIVE
LIPASE SERPL-CCNC: 10 U/L (ref 13–60)
LYMPHOCYTES # BLD AUTO: 1.19 10*3/MM3 (ref 0.7–3.1)
LYMPHOCYTES NFR BLD AUTO: 14.8 % (ref 19.6–45.3)
MAGNESIUM SERPL-MCNC: 2 MG/DL (ref 1.6–2.4)
MCH RBC QN AUTO: 28.1 PG (ref 26.6–33)
MCHC RBC AUTO-ENTMCNC: 32 G/DL (ref 31.5–35.7)
MCV RBC AUTO: 87.8 FL (ref 79–97)
MONOCYTES # BLD AUTO: 0.69 10*3/MM3 (ref 0.1–0.9)
MONOCYTES NFR BLD AUTO: 8.6 % (ref 5–12)
NEUTROPHILS NFR BLD AUTO: 5.91 10*3/MM3 (ref 1.7–7)
NEUTROPHILS NFR BLD AUTO: 73.7 % (ref 42.7–76)
NITRITE UR QL STRIP: NEGATIVE
PH UR STRIP.AUTO: 6 [PH] (ref 5–8)
PLATELET # BLD AUTO: 232 10*3/MM3 (ref 140–450)
PMV BLD AUTO: 9.9 FL (ref 6–12)
POTASSIUM SERPL-SCNC: 4.6 MMOL/L (ref 3.5–5.2)
PROT SERPL-MCNC: 7.3 G/DL (ref 6–8.5)
PROT UR QL STRIP: NEGATIVE
RBC # BLD AUTO: 4.49 10*6/MM3 (ref 3.77–5.28)
SODIUM SERPL-SCNC: 141 MMOL/L (ref 136–145)
SP GR UR STRIP: 1.02 (ref 1–1.03)
TROPONIN T DELTA: 0 NG/L
TROPONIN T SERPL HS-MCNC: 7 NG/L
TROPONIN T SERPL HS-MCNC: 7 NG/L
UROBILINOGEN UR QL STRIP: NORMAL
WBC NRBC COR # BLD AUTO: 8.03 10*3/MM3 (ref 3.4–10.8)
WHOLE BLOOD HOLD COAG: NORMAL
WHOLE BLOOD HOLD SPECIMEN: NORMAL

## 2024-11-23 PROCEDURE — 93005 ELECTROCARDIOGRAM TRACING: CPT | Performed by: PHYSICIAN ASSISTANT

## 2024-11-23 PROCEDURE — 85379 FIBRIN DEGRADATION QUANT: CPT | Performed by: PHYSICIAN ASSISTANT

## 2024-11-23 PROCEDURE — 83735 ASSAY OF MAGNESIUM: CPT | Performed by: PHYSICIAN ASSISTANT

## 2024-11-23 PROCEDURE — 99285 EMERGENCY DEPT VISIT HI MDM: CPT

## 2024-11-23 PROCEDURE — 80053 COMPREHEN METABOLIC PANEL: CPT | Performed by: PHYSICIAN ASSISTANT

## 2024-11-23 PROCEDURE — 25510000001 IOPAMIDOL 61 % SOLUTION: Performed by: FAMILY MEDICINE

## 2024-11-23 PROCEDURE — 84484 ASSAY OF TROPONIN QUANT: CPT | Performed by: PHYSICIAN ASSISTANT

## 2024-11-23 PROCEDURE — 81003 URINALYSIS AUTO W/O SCOPE: CPT | Performed by: PHYSICIAN ASSISTANT

## 2024-11-23 PROCEDURE — 74177 CT ABD & PELVIS W/CONTRAST: CPT

## 2024-11-23 PROCEDURE — 71045 X-RAY EXAM CHEST 1 VIEW: CPT

## 2024-11-23 PROCEDURE — 36415 COLL VENOUS BLD VENIPUNCTURE: CPT

## 2024-11-23 PROCEDURE — 85025 COMPLETE CBC W/AUTO DIFF WBC: CPT | Performed by: PHYSICIAN ASSISTANT

## 2024-11-23 PROCEDURE — 83690 ASSAY OF LIPASE: CPT | Performed by: PHYSICIAN ASSISTANT

## 2024-11-23 RX ORDER — ASPIRIN 325 MG
325 TABLET ORAL ONCE
Status: DISCONTINUED | OUTPATIENT
Start: 2024-11-23 | End: 2024-11-23 | Stop reason: HOSPADM

## 2024-11-23 RX ORDER — ONDANSETRON 2 MG/ML
4 INJECTION INTRAMUSCULAR; INTRAVENOUS EVERY 6 HOURS PRN
Status: DISCONTINUED | OUTPATIENT
Start: 2024-11-23 | End: 2024-11-23 | Stop reason: HOSPADM

## 2024-11-23 RX ORDER — IOPAMIDOL 612 MG/ML
100 INJECTION, SOLUTION INTRAVASCULAR
Status: COMPLETED | OUTPATIENT
Start: 2024-11-23 | End: 2024-11-23

## 2024-11-23 RX ADMIN — IOPAMIDOL 100 ML: 612 INJECTION, SOLUTION INTRAVENOUS at 15:07

## 2024-11-23 NOTE — DISCHARGE INSTRUCTIONS
Follow-up with PCP for recheck of symptoms.  Return the emergency department for new, worsening, concerning symptoms

## 2024-11-24 NOTE — FSED PROVIDER NOTE
Subjective  History of Present Illness:    Patient is a 64-year-old female with a medical history of asthma, diabetes, hyperlipidemia, rheumatoid arthritis presents emergency department for evaluation of several months of left flank pain described as a spasming sensation.  Patient denies aggravating alleviating factors.  Patient denies fevers, nausea vomiting, urinary symptoms, change in bowel movement.  Patient denies history of kidney stones.  Patient has not sought medical evaluation for symptoms until today.  Symptoms have been more frequent over the past couple of weeks prompting her to come the emergency department for evaluation.  Patient states that while she was using the restroom in the emergency department she developed some mid chest pain without other associated symptoms.  Patient denies history of MI, DVT or PE, arrhythmia.      Nurses Notes reviewed and agree, including vitals, allergies, social history and prior medical history.     REVIEW OF SYSTEMS: All systems reviewed and not pertinent unless noted.  Review of Systems   Cardiovascular:  Positive for chest pain.   Genitourinary:  Positive for flank pain.   All other systems reviewed and are negative.      Past Medical History:   Diagnosis Date    Asthma     DM (diabetes mellitus), type 2, uncontrolled     Hyperlipidemia     Rheumatoid arthritis        Allergies:    Patient has no known allergies.      Past Surgical History:   Procedure Laterality Date    HYSTERECTOMY  2014         Social History     Socioeconomic History    Marital status:    Tobacco Use    Smoking status: Former     Current packs/day: 0.00     Average packs/day: 0.5 packs/day for 2.0 years (1.0 ttl pk-yrs)     Types: Cigarettes     Start date: 10/19/1995     Quit date: 10/19/1997     Years since quittin.1    Smokeless tobacco: Never   Vaping Use    Vaping status: Never Used   Substance and Sexual Activity    Alcohol use: No    Drug use: No    Sexual activity: Defer  "        Family History   Problem Relation Age of Onset    Heart attack Mother 84    Hypertension Mother     Aneurysm Mother     Arthritis Father     Diabetes Father     Hypertension Father     Dementia Father     Diabetes Maternal Grandmother     No Known Problems Maternal Grandfather     No Known Problems Paternal Grandmother     No Known Problems Paternal Grandfather        Objective  Physical Exam:  /100   Pulse 85   Temp 98.3 °F (36.8 °C) (Oral)   Resp 18   Ht 157.5 cm (62\")   Wt 93.9 kg (207 lb)   SpO2 93%   BMI 37.86 kg/m²      Physical Exam  Vitals and nursing note reviewed.   Constitutional:       General: She is not in acute distress.     Appearance: Normal appearance. She is not toxic-appearing.   HENT:      Head: Normocephalic and atraumatic.      Nose: Nose normal.      Mouth/Throat:      Mouth: Mucous membranes are moist.   Eyes:      Extraocular Movements: Extraocular movements intact.      Conjunctiva/sclera: Conjunctivae normal.      Pupils: Pupils are equal, round, and reactive to light.   Cardiovascular:      Rate and Rhythm: Normal rate and regular rhythm.      Pulses: Normal pulses.      Heart sounds: Normal heart sounds. No murmur heard.  Pulmonary:      Effort: Pulmonary effort is normal. No respiratory distress.      Breath sounds: Normal breath sounds. No stridor. No wheezing.   Abdominal:      General: Abdomen is flat. There is no distension.      Palpations: Abdomen is soft. There is no mass.      Tenderness: There is no abdominal tenderness. There is no right CVA tenderness, left CVA tenderness or guarding.   Musculoskeletal:         General: No deformity. Normal range of motion.      Cervical back: Normal range of motion and neck supple.      Right lower leg: No edema.      Left lower leg: No edema.   Skin:     General: Skin is warm and dry.      Capillary Refill: Capillary refill takes less than 2 seconds.      Findings: No rash.   Neurological:      General: No focal " deficit present.      Mental Status: She is alert and oriented to person, place, and time.      Cranial Nerves: No cranial nerve deficit.      Sensory: No sensory deficit.      Gait: Gait normal.   Psychiatric:         Mood and Affect: Mood normal.         Behavior: Behavior normal.         Procedures    ED Course:         Lab Results (last 24 hours)       Procedure Component Value Units Date/Time    Urinalysis With Microscopic If Indicated (No Culture) - Urine, Clean Catch [[id number]]  (Normal) Collected: 11/23/24 1353    Specimen: Urine, Clean Catch Updated: 11/23/24 1400     Color, UA Yellow     Appearance, UA Clear     pH, UA 6.0     Specific Gravity, UA 1.025     Glucose, UA Negative     Ketones, UA Negative     Bilirubin, UA Negative     Blood, UA Negative     Protein, UA Negative     Leuk Esterase, UA Negative     Nitrite, UA Negative     Urobilinogen, UA 0.2 E.U./dL    Narrative:      Urine microscopic not indicated.    CBC & Differential [[id number]]  (Abnormal) Collected: 11/23/24 1403    Specimen: Blood Updated: 11/23/24 1425    Narrative:      The following orders were created for panel order CBC & Differential.  Procedure                               Abnormality         Status                     ---------                               -----------         ------                     CBC Auto Differential[[id number]]        Abnormal            Final result               Scan Slide[[id number]]                                                                    Please view results for these tests on the individual orders.    Comprehensive Metabolic Panel [[id number]]  (Abnormal) Collected: 11/23/24 1403    Specimen: Blood Updated: 11/23/24 1431     Glucose 99 mg/dL      BUN 8 mg/dL      Creatinine 0.92 mg/dL      Sodium 141 mmol/L      Potassium 4.6 mmol/L      Comment: Specimen hemolyzed.  Result may be falsely elevated.        Chloride 106 mmol/L      CO2 21.3 mmol/L      Calcium 9.4 mg/dL      Total Protein  7.3 g/dL      Albumin 4.1 g/dL      ALT (SGPT) 16 U/L      Comment: Specimen hemolyzed.  Result may  be falsely elevated.        AST (SGOT) 38 U/L      Comment: Specimen hemolyzed.  Result may be falsely elevated.        Alkaline Phosphatase 118 U/L      Total Bilirubin 0.5 mg/dL      Globulin 3.2 gm/dL      A/G Ratio 1.3 g/dL      BUN/Creatinine Ratio 8.7     Anion Gap 13.7 mmol/L      eGFR 70.1 mL/min/1.73     Narrative:      GFR Normal >60  Chronic Kidney Disease <60  Kidney Failure <15      Lipase [[id number]]  (Abnormal) Collected: 11/23/24 1403    Specimen: Blood Updated: 11/23/24 1430     Lipase 10 U/L      Comment: Specimen hemolyzed.  Results may be falsely decreased.       High Sensitivity Troponin T [[id number]]  (Normal) Collected: 11/23/24 1403    Specimen: Blood Updated: 11/23/24 1429     HS Troponin T 7 ng/L     Single High Sensitivity Troponin T [[id number]]  (Normal) Collected: 11/23/24 1403    Specimen: Blood Updated: 11/23/24 1429     HS Troponin T 7 ng/L     Magnesium [[id number]]  (Normal) Collected: 11/23/24 1403    Specimen: Blood Updated: 11/23/24 1430     Magnesium 2.0 mg/dL     D-dimer, Quantitative [[id number]]  (Normal) Collected: 11/23/24 1420    Specimen: Blood Updated: 11/23/24 1444     D-Dimer, Quantitative 0.44 MCGFEU/mL     Narrative:      According to the assay 's published package insert, a normal (<0.50 MCGFEU/mL) D-dimer result in conjunction with a non-high clinical probability assessment, excludes deep vein thrombosis (DVT) and pulmonary embolism (PE) with high sensitivity.    D-dimer values increase with age and this can make VTE exclusion of an older population difficult. To address this, the [employer], based on best available evidence and recent guidelines, recommends that clinicians use age-adjusted D-dimer thresholds in patients greater than 50 years of age with: a) a low probability of PE who do not meet all Pulmonary Embolism Rule Out  "Criteria, or b) in those with intermediate probability of PE.   The formula for an age-adjusted D-dimer cut-off is \"age/100\".  For example, a 60 year old patient would have an age-adjusted cut-off of 0.60 MCGFEU/mL and an 80 year old 0.80 MCGFEU/mL.    CBC Auto Differential [[id number]]  (Abnormal) Collected: 11/23/24 1420    Specimen: Blood Updated: 11/23/24 1425     WBC 8.03 10*3/mm3      RBC 4.49 10*6/mm3      Hemoglobin 12.6 g/dL      Hematocrit 39.4 %      MCV 87.8 fL      MCH 28.1 pg      MCHC 32.0 g/dL      RDW 12.7 %      RDW-SD 41.6 fl      MPV 9.9 fL      Platelets 232 10*3/mm3      Neutrophil % 73.7 %      Lymphocyte % 14.8 %      Monocyte % 8.6 %      Eosinophil % 2.7 %      Basophil % 0.2 %      Immature Grans % 0.0 %      Neutrophils, Absolute 5.91 10*3/mm3      Lymphocytes, Absolute 1.19 10*3/mm3      Monocytes, Absolute 0.69 10*3/mm3      Eosinophils, Absolute 0.22 10*3/mm3      Basophils, Absolute 0.02 10*3/mm3      Immature Grans, Absolute 0.00 10*3/mm3     High Sensitivity Troponin T 2Hr [[id number]]  (Normal) Collected: 11/23/24 1616    Specimen: Blood Updated: 11/23/24 1641     HS Troponin T 7 ng/L      Troponin T Delta 0 ng/L              CT Abdomen Pelvis With Contrast    Result Date: 11/23/2024  CT ABDOMEN PELVIS W CONTRAST Date of Exam: 11/23/2024 2:57 PM EST Indication: LLQ and left flank pain. Comparison: CT abdomen pelvis 6/18/2022 Technique: Axial CT images were obtained of the abdomen and pelvis following the uneventful intravenous administration of 100 mL Isovue-300. Reconstructed coronal and sagittal images were also obtained. Automated exposure control and iterative construction methods were used. Findings: Lung bases are clear. Unremarkable appearance of the liver, gallbladder, bile ducts, spleen, pancreas, adrenal glands. There are couple small simple bilateral renal cortical cysts. Otherwise unremarkable appearance of the kidneys without nephrolithiasis or hydronephrosis. Normal " appearance of the ureters and bladder. The uterus is surgically absent. There is mild sigmoid diverticulosis without evidence of diverticulitis. Normal appendix. No bowel obstruction. No inflammatory change of the GI tract. No abdominopelvic free fluid or fat stranding. No pneumoperitoneum. There is some atherosclerosis of the abdominal aorta with otherwise unremarkable appearance of the vasculature. There are shotty retroperitoneal lymph nodes, nonspecific; no bulky or clearly pathologic adenopathy. Unremarkable appearance of the body wall soft tissues. No acute or suspicious bony findings.     Impression: Impression: No acute abdominopelvic findings. Electronically Signed: Tae Joseph MD  11/23/2024 3:16 PM EST  Workstation ID: [id number]    XR Chest 1 View    Result Date: 11/23/2024  XR CHEST 1 VW Date of Exam: 11/23/2024 1:58 PM EST Indication: chest pain Comparison: AP chest x-ray 3/8/2024 Findings: Cardiac silhouette remains mildly enlarged. Lungs are adequately expanded and appear clear. No pneumothorax or large pleural effusion is seen.     Impression: Impression: No acute cardiopulmonary abnormality is identified. Electronically Signed: Shima Butler  11/23/2024 2:34 PM EST  Workstation ID: [id number]        [facility]      Initial impression of presenting illness: Patient is a 64-year-old female medical history of asthma, hyperlipidemia, rheumatoid arthritis presents emergency department for evaluation of left flank pain for the past several months described as a spasming sensation.  Patient did states she suddenly developed chest pain after arriving to the emergency department when using the restroom here.    Patient arrives afebrile, hematin stable, nontoxic-appearing with vitals interpreted by myself.     Pertinent features from physical exam: Soft, nontender abdomen.  No CVA TTP.    DDX: includes but is not limited to: ACS, arrhythmia, PE, gastritis, kidney stone, pyelonephritis, back muscle spasm or strain,  others    Initial diagnostic plan and interventions: Workup includes CBC, CMP, lipase, urinalysis, troponin, EKG, chest x-ray, D-dimer, CT abdomen pelvis with contrast.    Diagnostic information from other sources: Previous encounter    Results from initial plan were reviewed and interpreted by me revealing no evidence of leukocytosis, significant anemia or electrolyte abnormality.  There is mild AST elevation at 38.  Troponin 0 hours unremarkable without delta.  EKG is normal sinus rhythm without significant ST segment changes or T wave abnormalities.  D-dimer is unremarkable.  CT abdomen pelvis reveals no acute findings.    Re-evaluation: On reevaluation patient is completely asymptomatic and well-appearing and hemodynamic stable.  At this time patient is medically cleared for outpatient follow-up.  Patient will be given referral for cardiology for chest pain.  Patient instructed to follow closely with PCP for further evaluation of left flank or paraspinal muscle pain.  Patient given strict return precautions to the emergency department.  Patient has a heart score of 3.      Medications   iopamidol (ISOVUE-300) 61 % injection 100 mL (100 mL Intravenous Given 11/23/24 1507)       Results/clinical rationale were discussed with patient      Data interpreted: Nursing notes reviewed, vital signs reviewed.  Labs independently interpreted by me.  Imaging independently interpreted by me.  EKG independently interpreted by me.     Counseling: Discussed the results above with the patient regarding need for admission or discharge.  Patient understands and agrees plan of care.      -----  ED Disposition       ED Disposition   Discharge    Condition   Stable    Comment   --             Final diagnoses:   Left flank pain, chronic   Atypical chest pain      Your Follow-Up Providers       Schedule an appointment as soon as possible for a visit  with Chago Brandon MD.    Specialty: Family Medicine  [address] [place] [address]  [id number]  [phone]               [facility] .    Specialty: Cardiology  [address]  [facility] [address] [phone]  Additional information:  Phone Number: [phone]. Located on the same side of the street as [place].  Entrance Information: Patients are encouraged to park in front of [facility], using Entrance A, near the fountain.                      Contact information for after-discharge care    Follow-up information has not been specified.                    Your medication list        CONTINUE taking these medications        Instructions Last Dose Given Next Dose Due   Advair Diskus 250-50 MCG/DOSE DISKUS  Generic drug: Fluticasone-Salmeterol      Inhale 1 puff 2 (Two) Times a Day.       alclometasone 0.05 % ointment  Commonly known as: ACLOVATE      Apply  topically to the appropriate area as directed Daily.       atorvastatin 20 MG tablet  Commonly known as: LIPITOR      TAKE 1 TABLET BY MOUTH DAILY       Blood Glucose Monitoring Suppl device      Testing once daily       estradiol 0.1 MG/24HR patch  Commonly known as: VIVELLE-DOT      APPLY 1 PATCH TOPICALLY TWICE A WEEK AS DIRECTED       glucose blood test strip      Testing once daily       hydroxychloroquine 200 MG tablet  Commonly known as: PLAQUENIL      Take 1 tablet by mouth 2 (Two) Times a Day.       Lancets 33G misc      1 each Daily.       levocetirizine 5 MG tablet  Commonly known as: XYZAL      Take 1 tablet by mouth Daily.       lidocaine 5 % ointment  Commonly known as: XYLOCAINE      APPLY TO AFFECTED AREAS AS DIRECTED.       metFORMIN  MG 24 hr tablet  Commonly known as: GLUCOPHAGE-XR      Take 1 tablet by mouth Daily With Dinner.       ProAir  (90 Base) MCG/ACT inhaler  Generic drug: albuterol sulfate HFA      Inhale 2 puffs Every 6 (Six) Hours As Needed.       TUMERSAID PO      Take 500 mg by mouth.       vitamin E 1000 UNIT capsule       Take 1 capsule by mouth Daily.

## 2024-11-25 LAB
QT INTERVAL: 360 MS
QTC INTERVAL: 435 MS

## 2024-12-16 ENCOUNTER — OFFICE VISIT (OUTPATIENT)
Dept: ENDOCRINOLOGY | Facility: CLINIC | Age: 64
End: 2024-12-16
Payer: COMMERCIAL

## 2024-12-16 VITALS
SYSTOLIC BLOOD PRESSURE: 138 MMHG | BODY MASS INDEX: 38.28 KG/M2 | WEIGHT: 208 LBS | DIASTOLIC BLOOD PRESSURE: 82 MMHG | HEIGHT: 62 IN | HEART RATE: 82 BPM | OXYGEN SATURATION: 98 %

## 2024-12-16 DIAGNOSIS — E11.9 TYPE 2 DIABETES MELLITUS WITHOUT COMPLICATION, WITHOUT LONG-TERM CURRENT USE OF INSULIN: Primary | ICD-10-CM

## 2024-12-16 DIAGNOSIS — E78.2 MIXED HYPERLIPIDEMIA: ICD-10-CM

## 2024-12-16 LAB
EXPIRATION DATE: ABNORMAL
EXPIRATION DATE: NORMAL
GLUCOSE BLDC GLUCOMTR-MCNC: 85 MG/DL (ref 70–130)
HBA1C MFR BLD: 6 % (ref 4.5–5.7)
Lab: ABNORMAL
Lab: NORMAL

## 2024-12-16 PROCEDURE — 83036 HEMOGLOBIN GLYCOSYLATED A1C: CPT | Performed by: INTERNAL MEDICINE

## 2024-12-16 PROCEDURE — 99213 OFFICE O/P EST LOW 20 MIN: CPT | Performed by: INTERNAL MEDICINE

## 2024-12-16 PROCEDURE — 82947 ASSAY GLUCOSE BLOOD QUANT: CPT | Performed by: INTERNAL MEDICINE

## 2024-12-16 NOTE — PROGRESS NOTES
"Chief Complaint   Patient presents with    Diabetes     HPI:   Sophie Medina is a 64 y.o.female who returns to endocrine clinic for f/u evaluation of pt's Type 2 DM and hyperlipidemia. Last visit 06/11/2024. Her history is as follows:    Interim Events:  - followed by  Rheumatology for her RA. Currently on Plaquenil.  - (11/232024) s/p EMG to evaluate her foot pain which was negative for neuropathy  - pt reports her PCP (who practices functional medicine) told her atorvastatin causes brain fog and asked her to request to change her medication.   - Pt had labs at PCP's office on 12/13/2024, see below    1) Type 2 DM with no known associated complications at this time:  - diagnosed in 2014 during routine evaluation. Presented with c/o polyuria  - pt states she did not tolerate the ER metformin prescribed in 8/2016 - said it caused \"low blood sugars\" and \"dizziness\"  - was on low dose metformin, Had pt go off medication in 08/2019 to see if she could manage with dietary changes alone.     Current DM Medications:  - metformin  mg daily. Denies missed doses    Glucometer Review: no meter for today    DM Health Maintenance:  Ophtho: Last exam 01/2023 - no retinopathy per note. On plaquenil for RA  Podiatry: N/A  Monofilament / foot exam: 11/2021  Lipids: (12/2023) Tchol 159, TG 49, HDL 50, LDL 99 - on atorvastatin 20 mg daily  Urine Microalb/Cr ratio: (12/2023) normal   TSH: (12/2024) 1.290 - normal  CMP: (06/2024) Cr 0.99, eGFR 64.6, LFTs WNL  CBC: (06/2024) WNL, Hgb 12.9  Vit B12: (06/2024) 1,040     2) mixed hyperlipidemia:  - tolerating atorvastatin 20 mg daily  Lipids: (12/2023) Tchol 159, TG 49, HDL 50, LDL 99 - on atorvastatin 20 mg daily      Other medical history:   - h/o RA, on plaquenil. Followed at [facility]     Review of Systems   Constitutional:  Negative for fatigue.        Wt stable   HENT: Negative.  Negative for congestion and rhinorrhea.    Eyes:  Negative for pain, redness, itching and visual " "disturbance.   Respiratory:  Positive for wheezing (occasional). Negative for cough and shortness of breath.    Cardiovascular: Negative.  Negative for chest pain.   Gastrointestinal: Negative.    Endocrine: Negative.  Negative for polydipsia, polyphagia and polyuria.   Genitourinary:  Negative for frequency and urgency.   Musculoskeletal:  Positive for arthralgias. Negative for joint swelling and myalgias.        Foot pain   Skin: Negative.    Neurological:  Negative for headaches.   Hematological: Negative.    Psychiatric/Behavioral:  Positive for sleep disturbance.      The following portions of the patient's history were reviewed and updated as appropriate: allergies, current medications, past family history, past medical history, past social history, past surgical history and problem list.    /82   Pulse 82   Ht 157.5 cm (62.01\")   Wt 94.3 kg (208 lb)   SpO2 98%   BMI 38.03 kg/m²   Physical Exam   Constitutional: She is oriented to person, place, and time. She appears well-developed. No distress.   HENT:   Head: Normocephalic.   Eyes: Pupils are equal, round, and reactive to light. Conjunctivae are normal.   Neck: No thyromegaly present.   No palpable thyroid nodules     Cardiovascular: Normal rate, regular rhythm and normal heart sounds.   No murmur heard.  Pulmonary/Chest: Effort normal and breath sounds normal. She has no wheezes.   Abdominal: Soft. Normal appearance and bowel sounds are normal. She exhibits no mass. There is no abdominal tenderness.   Lymphadenopathy:     She has no cervical adenopathy.   Neurological: She is alert and oriented to person, place, and time. No cranial nerve deficit.   Skin: Skin is warm. No rash noted. No erythema.   Acanthosis nigricans   Psychiatric: Her behavior is normal.   Vitals reviewed.      LABS/IMAGING: outside records reviewed and summarized in HPI  Results for orders placed or performed in visit on 12/16/24   POC Glycosylated Hemoglobin (Hb A1C)    " Collection Time: 12/16/24  3:34 PM    Specimen: Blood   Result Value Ref Range    Hemoglobin A1C 6.0 (A) 4.5 - 5.7 %    Lot Number [id number]     Expiration Date 91,626    POC Glucose, Blood    Collection Time: 12/16/24  3:34 PM    Specimen: Blood   Result Value Ref Range    Glucose 85 70 - 130 mg/dL    Lot Number [id number]     Expiration Date 91,626      ASSESSMENT/PLAN:  1) Type 2 DM with no associated complications at this time: controlled, her A1C% is 6.0% today and was 6.60% last visit.  - continue metformin  mg daily.   - counseled pt on carb consistent meal planning    DM health maintenance labs form 06/2024 reviewed.     2) mixed hyperlipidemia: controlled  Lipids: (12/2023) Tchol 159, TG 49, HDL 50, LDL 99 - on atorvastatin 20 mg daily  - Counseled pt that a statin is recommended for primary prevention of CVD  - pt reports her PCP (who practices functional medicine) told her atorvastatin causes brain fog and asked her to request to change her medication. Advised pt to hold the atorvastatin for 2 weeks to see if she truly sees an improvement off the medication. If she feels better off the atorvastatin, will switch to a different statin.   Addendum: 12/23/2024, pt contacted me via [employer] and reported she did not feel better off the atorvastatin.   - continuing atorvastatin 20 mg daily    RTC 6 months    Electronically Signed: Nicki Ayala MD

## 2024-12-17 ENCOUNTER — TELEPHONE (OUTPATIENT)
Dept: ENDOCRINOLOGY | Facility: CLINIC | Age: 64
End: 2024-12-17
Payer: COMMERCIAL

## 2024-12-17 NOTE — TELEPHONE ENCOUNTER
----- Message from Nicki Ayala sent at 12/16/2024  3:43 PM EST -----  Regarding: recent labs from PCP's office  Please get copy of recent labs from pt's PCP's office  Thanks  MD  
Spoke to office - they will fax  
upper/lower

## 2024-12-28 LAB
QT INTERVAL: 360 MS
QTC INTERVAL: 435 MS

## 2025-02-19 ENCOUNTER — HOSPITAL ENCOUNTER (EMERGENCY)
Facility: HOSPITAL | Age: 65
Discharge: HOME | End: 2025-02-20
Attending: EMERGENCY MEDICINE
Payer: COMMERCIAL

## 2025-02-19 ENCOUNTER — APPOINTMENT (OUTPATIENT)
Dept: MRI IMAGING | Facility: HOSPITAL | Age: 65
End: 2025-02-19
Payer: COMMERCIAL

## 2025-02-19 ENCOUNTER — APPOINTMENT (OUTPATIENT)
Dept: CT IMAGING | Facility: HOSPITAL | Age: 65
End: 2025-02-19
Payer: COMMERCIAL

## 2025-02-19 DIAGNOSIS — R82.4 KETONURIA: ICD-10-CM

## 2025-02-19 DIAGNOSIS — R51.9 ACUTE NONINTRACTABLE HEADACHE, UNSPECIFIED HEADACHE TYPE: Primary | ICD-10-CM

## 2025-02-19 DIAGNOSIS — N28.9 RENAL INSUFFICIENCY: ICD-10-CM

## 2025-02-19 LAB
ALBUMIN SERPL-MCNC: 4.2 G/DL (ref 3.5–5.2)
ALBUMIN/GLOB SERPL: 1.4 G/DL
ALP SERPL-CCNC: 111 U/L (ref 39–117)
ALT SERPL W P-5'-P-CCNC: 14 U/L (ref 1–33)
ANION GAP SERPL CALCULATED.3IONS-SCNC: 9 MMOL/L (ref 5–15)
AST SERPL-CCNC: 20 U/L (ref 1–32)
BACTERIA UR QL AUTO: ABNORMAL /HPF
BASOPHILS # BLD AUTO: 0.04 10*3/MM3 (ref 0–0.2)
BASOPHILS NFR BLD AUTO: 0.4 % (ref 0–1.5)
BILIRUB SERPL-MCNC: 0.5 MG/DL (ref 0–1.2)
BILIRUB UR QL STRIP: NEGATIVE
BUN SERPL-MCNC: 8 MG/DL (ref 8–23)
BUN/CREAT SERPL: 7.6 (ref 7–25)
CALCIUM SPEC-SCNC: 9.4 MG/DL (ref 8.6–10.5)
CHLORIDE SERPL-SCNC: 105 MMOL/L (ref 98–107)
CLARITY UR: CLEAR
CO2 SERPL-SCNC: 26 MMOL/L (ref 22–29)
COLOR UR: YELLOW
CREAT SERPL-MCNC: 1.05 MG/DL (ref 0.57–1)
DEPRECATED RDW RBC AUTO: 39.4 FL (ref 37–54)
EGFRCR SERPLBLD CKD-EPI 2021: 59.8 ML/MIN/1.73
EOSINOPHIL # BLD AUTO: 0.06 10*3/MM3 (ref 0–0.4)
EOSINOPHIL NFR BLD AUTO: 0.6 % (ref 0.3–6.2)
ERYTHROCYTE [DISTWIDTH] IN BLOOD BY AUTOMATED COUNT: 12.5 % (ref 12.3–15.4)
GLOBULIN UR ELPH-MCNC: 2.9 GM/DL
GLUCOSE SERPL-MCNC: 98 MG/DL (ref 65–99)
GLUCOSE UR STRIP-MCNC: NEGATIVE MG/DL
HCT VFR BLD AUTO: 38.8 % (ref 34–46.6)
HGB BLD-MCNC: 12.9 G/DL (ref 12–15.9)
HGB UR QL STRIP.AUTO: NEGATIVE
HOLD SPECIMEN: NORMAL
HYALINE CASTS UR QL AUTO: ABNORMAL /LPF
IMM GRANULOCYTES # BLD AUTO: 0.02 10*3/MM3 (ref 0–0.05)
IMM GRANULOCYTES NFR BLD AUTO: 0.2 % (ref 0–0.5)
KETONES UR QL STRIP: ABNORMAL
LEUKOCYTE ESTERASE UR QL STRIP.AUTO: ABNORMAL
LYMPHOCYTES # BLD AUTO: 1.62 10*3/MM3 (ref 0.7–3.1)
LYMPHOCYTES NFR BLD AUTO: 16.4 % (ref 19.6–45.3)
MCH RBC QN AUTO: 28.6 PG (ref 26.6–33)
MCHC RBC AUTO-ENTMCNC: 33.2 G/DL (ref 31.5–35.7)
MCV RBC AUTO: 86 FL (ref 79–97)
MONOCYTES # BLD AUTO: 0.83 10*3/MM3 (ref 0.1–0.9)
MONOCYTES NFR BLD AUTO: 8.4 % (ref 5–12)
NEUTROPHILS NFR BLD AUTO: 7.28 10*3/MM3 (ref 1.7–7)
NEUTROPHILS NFR BLD AUTO: 74 % (ref 42.7–76)
NITRITE UR QL STRIP: NEGATIVE
NRBC BLD AUTO-RTO: 0 /100 WBC (ref 0–0.2)
PH UR STRIP.AUTO: <=5 [PH] (ref 5–8)
PLATELET # BLD AUTO: 291 10*3/MM3 (ref 140–450)
PMV BLD AUTO: 9.8 FL (ref 6–12)
POTASSIUM SERPL-SCNC: 3.6 MMOL/L (ref 3.5–5.2)
PROT SERPL-MCNC: 7.1 G/DL (ref 6–8.5)
PROT UR QL STRIP: ABNORMAL
RBC # BLD AUTO: 4.51 10*6/MM3 (ref 3.77–5.28)
RBC # UR STRIP: ABNORMAL /HPF
REF LAB TEST METHOD: ABNORMAL
SODIUM SERPL-SCNC: 140 MMOL/L (ref 136–145)
SP GR UR STRIP: 1.02 (ref 1–1.03)
SQUAMOUS #/AREA URNS HPF: ABNORMAL /HPF
UROBILINOGEN UR QL STRIP: ABNORMAL
WBC # UR STRIP: ABNORMAL /HPF
WBC NRBC COR # BLD AUTO: 9.85 10*3/MM3 (ref 3.4–10.8)
WHOLE BLOOD HOLD COAG: NORMAL
WHOLE BLOOD HOLD SPECIMEN: NORMAL

## 2025-02-19 PROCEDURE — 99284 EMERGENCY DEPT VISIT MOD MDM: CPT

## 2025-02-19 PROCEDURE — 70450 CT HEAD/BRAIN W/O DYE: CPT

## 2025-02-19 PROCEDURE — 25010000002 DIPHENHYDRAMINE PER 50 MG: Performed by: EMERGENCY MEDICINE

## 2025-02-19 PROCEDURE — 36415 COLL VENOUS BLD VENIPUNCTURE: CPT

## 2025-02-19 PROCEDURE — 96375 TX/PRO/DX INJ NEW DRUG ADDON: CPT

## 2025-02-19 PROCEDURE — 70551 MRI BRAIN STEM W/O DYE: CPT

## 2025-02-19 PROCEDURE — 81001 URINALYSIS AUTO W/SCOPE: CPT | Performed by: EMERGENCY MEDICINE

## 2025-02-19 PROCEDURE — 80053 COMPREHEN METABOLIC PANEL: CPT | Performed by: EMERGENCY MEDICINE

## 2025-02-19 PROCEDURE — 25010000002 METOCLOPRAMIDE PER 10 MG: Performed by: EMERGENCY MEDICINE

## 2025-02-19 PROCEDURE — 96374 THER/PROPH/DIAG INJ IV PUSH: CPT

## 2025-02-19 PROCEDURE — 25010000002 KETOROLAC TROMETHAMINE PER 15 MG: Performed by: EMERGENCY MEDICINE

## 2025-02-19 PROCEDURE — 85025 COMPLETE CBC W/AUTO DIFF WBC: CPT | Performed by: EMERGENCY MEDICINE

## 2025-02-19 RX ORDER — DIAZEPAM 10 MG/2ML
2.5 INJECTION, SOLUTION INTRAMUSCULAR; INTRAVENOUS ONCE
Status: DISCONTINUED | OUTPATIENT
Start: 2025-02-19 | End: 2025-02-20 | Stop reason: HOSPADM

## 2025-02-19 RX ORDER — DIPHENHYDRAMINE HYDROCHLORIDE 50 MG/ML
25 INJECTION INTRAMUSCULAR; INTRAVENOUS ONCE
Status: COMPLETED | OUTPATIENT
Start: 2025-02-19 | End: 2025-02-19

## 2025-02-19 RX ORDER — KETOROLAC TROMETHAMINE 15 MG/ML
15 INJECTION, SOLUTION INTRAMUSCULAR; INTRAVENOUS ONCE
Status: COMPLETED | OUTPATIENT
Start: 2025-02-19 | End: 2025-02-19

## 2025-02-19 RX ORDER — METOCLOPRAMIDE HYDROCHLORIDE 5 MG/ML
10 INJECTION INTRAMUSCULAR; INTRAVENOUS ONCE
Status: COMPLETED | OUTPATIENT
Start: 2025-02-19 | End: 2025-02-19

## 2025-02-19 RX ORDER — SODIUM CHLORIDE 0.9 % (FLUSH) 0.9 %
10 SYRINGE (ML) INJECTION AS NEEDED
Status: DISCONTINUED | OUTPATIENT
Start: 2025-02-19 | End: 2025-02-19

## 2025-02-19 RX ADMIN — KETOROLAC TROMETHAMINE 15 MG: 15 INJECTION, SOLUTION INTRAMUSCULAR; INTRAVENOUS at 22:36

## 2025-02-19 RX ADMIN — DIPHENHYDRAMINE HYDROCHLORIDE 25 MG: 50 INJECTION INTRAMUSCULAR; INTRAVENOUS at 22:37

## 2025-02-19 RX ADMIN — METOCLOPRAMIDE 10 MG: 5 INJECTION, SOLUTION INTRAMUSCULAR; INTRAVENOUS at 22:37

## 2025-02-19 NOTE — ED PROVIDER NOTES
Subjective   History of Present Illness  Patient is a pleasant 64-year-old female with a history of type 2 diabetes and rheumatoid arthritis.  Presents to the emergency department with a headache and hypertension.  Says yesterday when she was going to bed, yesterday evening, she felt a slight headache coming on.  When she woke this morning the headache was more pronounced as continued to get progressively worse throughout the day today.  Has waxed and waned but has been constant throughout the day.  Given the headache she is checked her blood pressure couple times and her blood pressure did not significantly elevated from its baseline today.  She states that she does not have a history of hypertension but today at 1 time her pressure was in the 220s systolic.  This combination of symptoms what prompts her visit to the emergency department.  She denies other associated complaints.  Denies fever, chest pain, breathing difficulty, abdominal pain, vomiting, diarrhea, or other acute complaints.  Denies visual deficit.  Denies weakness, numbness, coordination deficit, or other neurologic complaint.         Review of Systems   All other systems reviewed and are negative.      Past Medical History:   Diagnosis Date    Asthma     DM (diabetes mellitus), type 2, uncontrolled     Hyperlipidemia     Rheumatoid arthritis        No Known Allergies    Past Surgical History:   Procedure Laterality Date    HYSTERECTOMY  02/2014       Family History   Problem Relation Age of Onset    Heart attack Mother 84    Hypertension Mother     Aneurysm Mother     Arthritis Father     Diabetes Father     Hypertension Father     Dementia Father     Diabetes Maternal Grandmother     No Known Problems Maternal Grandfather     No Known Problems Paternal Grandmother     No Known Problems Paternal Grandfather        Social History     Socioeconomic History    Marital status:    Tobacco Use    Smoking status: Former     Current packs/day: 0.00      Average packs/day: 0.5 packs/day for 2.0 years (1.0 ttl pk-yrs)     Types: Cigarettes     Start date: 10/19/1995     Quit date: 10/19/1997     Years since quittin.3    Smokeless tobacco: Never   Vaping Use    Vaping status: Never Used   Substance and Sexual Activity    Alcohol use: No    Drug use: No    Sexual activity: Defer           Objective   Physical Exam  Vitals and nursing note reviewed.   Constitutional:       General: She is not in acute distress.  HENT:      Head: Normocephalic and atraumatic.   Eyes:      Conjunctiva/sclera: Conjunctivae normal.      Pupils: Pupils are equal, round, and reactive to light.   Neck:      Thyroid: No thyromegaly.   Cardiovascular:      Rate and Rhythm: Normal rate and regular rhythm. Tachycardia present.      Heart sounds: Normal heart sounds. No murmur heard.     No friction rub. No gallop.   Pulmonary:      Effort: Pulmonary effort is normal. No respiratory distress.      Breath sounds: Normal breath sounds.   Abdominal:      General: Bowel sounds are normal.      Palpations: Abdomen is soft.      Tenderness: There is no abdominal tenderness.   Musculoskeletal:         General: Normal range of motion.      Cervical back: Normal range of motion.   Skin:     General: Skin is warm and dry.   Neurological:      General: No focal deficit present.      Mental Status: She is alert and oriented to person, place, and time.   Psychiatric:         Behavior: Behavior normal.         Procedures           ED Course  ED Course as of 25 Workup is reassuring.  There was a nonspecific abnormality is suspecting calcification and age-related changes on the CT scan.  This was confirmed with MRI.  No acute blood or other acute abnormality.  Her blood pressure, even without intervention or treatment of the headache has been relatively unremarkable since she settled in her room.  Blood pressure has been in the 140s systolic the majority of her stay in  the emergency department and a diastolic in the 80s.  Vital signs essentially normal.  Given the normal imaging and improved blood pressure I am giving her a migraine cocktail.  Have her follow-up with primary care and neurology clinic.  Patient will monitor symptoms closely.  Both she and family will have a low threshold to return to the emergency department if symptoms persist, worsen, or other concerns arise. [CP]   2309 Headache is improving since the a migraine cocktail.  Still was present but is significantly better.  Patient admits that she has had increased stress lately and even for coming in she suspected that her increase stress was contributing to her headache.  Headache was gradual in onset.  Patient's had no visual or neurologic deficits.  CT and MRI this evening is reassuring.  I given her a referral to the neurology clinic.  She and the family member that is with her understand to monitor symptoms closely overnight and thereafter and have a low threshold to return to the emergency department if symptoms persist, worsen, or other concerns arise.  Both the patient and family member are comfortable with discharge at this time. [CP]      ED Course User Index  [CP] Danny Toth, DO      No results found for this or any previous visit (from the past 24 hours).  Note: In addition to lab results from this visit, the labs listed above may include labs taken at another facility or during a different encounter within the last 24 hours. Please correlate lab times with ED admission and discharge times for further clarification of the services performed during this visit.    MRI Brain Without Contrast   Final Result   Impression:   No acute intracranial finding.      Left basal ganglia abnormality favored to represent mineralization/calcification.         Electronically Signed: Jatin Medina     2/19/2025 9:23 PM EST     Workstation ID: [id number]      CT Head Without Contrast   Final Result   1. No convincing  acute intracranial findings by CT.   2. Tiny asymmetric left basal ganglia density probably reflecting senescent related calcifications. Differential may include small vascular malformation less likely parenchymal bleed. Consider dedicated brain MRI given history of hypertension and    headache.         Electronically Signed: Rickey Tanner MD     2/19/2025 5:59 PM EST     Workstation ID: [id number]        Vitals:    02/19/25 2259 02/19/25 2319 02/19/25 2339 02/19/25 2359   BP: 142/68 140/82 112/63 135/82   BP Location:       Patient Position:       Pulse: 90 87     Resp:       Temp:       TempSrc:       SpO2: 98% 97%     Weight:       Height:         Medications   diphenhydrAMINE (BENADRYL) injection 25 mg (25 mg Intravenous Given 2/19/25 2237)   ketorolac (TORADOL) injection 15 mg (15 mg Intravenous Given 2/19/25 2236)   metoclopramide (REGLAN) injection 10 mg (10 mg Intravenous Given 2/19/25 2237)     ECG/EMG Results (last 24 hours)       ** No results found for the last 24 hours. **          No orders to display      Latest Reference Range & Units 02/19/25 18:52 02/19/25 18:53   Sodium 136 - 145 mmol/L 140    Potassium 3.5 - 5.2 mmol/L 3.6    Chloride 98 - 107 mmol/L 105    CO2 22.0 - 29.0 mmol/L 26.0    Anion Gap 5.0 - 15.0 mmol/L 9.0    BUN 8 - 23 mg/dL 8    Creatinine 0.57 - 1.00 mg/dL 1.05 (H)    BUN/Creatinine Ratio 7.0 - 25.0  7.6    eGFR >60.0 mL/min/1.73 59.8 (L)    Glucose 65 - 99 mg/dL 98    Calcium 8.6 - 10.5 mg/dL 9.4    Alkaline Phosphatase 39 - 117 U/L 111    Total Protein 6.0 - 8.5 g/dL 7.1    Albumin 3.5 - 5.2 g/dL 4.2    Globulin gm/dL 2.9    A/G Ratio g/dL 1.4    AST (SGOT) 1 - 32 U/L 20    ALT (SGPT) 1 - 33 U/L 14    Total Bilirubin 0.0 - 1.2 mg/dL 0.5    WBC 3.40 - 10.80 10*3/mm3 9.85    RBC 3.77 - 5.28 10*6/mm3 4.51    Hemoglobin 12.0 - 15.9 g/dL 12.9    Hematocrit 34.0 - 46.6 % 38.8    Platelets 140 - 450 10*3/mm3 291    RDW 12.3 - 15.4 % 12.5    MCV 79.0 - 97.0 fL 86.0    MCH 26.6 - 33.0 pg  28.6    MCHC 31.5 - 35.7 g/dL 33.2    MPV 6.0 - 12.0 fL 9.8    RDW-SD 37.0 - 54.0 fl 39.4    Neutrophil Rel % 42.7 - 76.0 % 74.0    Lymphocyte Rel % 19.6 - 45.3 % 16.4 (L)    Monocyte Rel % 5.0 - 12.0 % 8.4    Eosinophil Rel % 0.3 - 6.2 % 0.6    Basophil Rel % 0.0 - 1.5 % 0.4    Immature Granulocyte Rel % 0.0 - 0.5 % 0.2    Neutrophils Absolute 1.70 - 7.00 10*3/mm3 7.28 (H)    Lymphocytes Absolute 0.70 - 3.10 10*3/mm3 1.62    Monocytes Absolute 0.10 - 0.90 10*3/mm3 0.83    Eosinophils Absolute 0.00 - 0.40 10*3/mm3 0.06    Basophils Absolute 0.00 - 0.20 10*3/mm3 0.04    Immature Grans, Absolute 0.00 - 0.05 10*3/mm3 0.02    nRBC 0.0 - 0.2 /100 WBC 0.0    Color, UA Yellow, Straw   Yellow   Appearance, UA Clear   Clear   Specific Gravity, UA 1.001 - 1.030   1.017   pH, UA 5.0 - 8.0   <=5.0   Glucose Negative   Negative   Ketones, UA Negative   Trace !   Blood, UA Negative   Negative   Nitrite, UA Negative   Negative   Leukocytes, UA Negative   Trace !   Protein, UA Negative   Trace !   Bilirubin, UA Negative   Negative   Urobilinogen, UA 0.2 - 1.0 E.U./dL   0.2 E.U./dL   RBC, UA None Seen, 0-2 /HPF  0-2   WBC, UA None Seen, 0-2 /HPF  3-5 !   Bacteria, UA None Seen, Trace /HPF  None Seen   Squamous Epithelial Cells, UA None Seen, 0-2 /HPF  0-2   Hyaline Casts, UA 0 - 6 /LPF  0-6   Methodology:   Automated Microscopy   (H): Data is abnormally high  (L): Data is abnormally low  !: Data is abnormal                                                   Medical Decision Making  Problems Addressed:  Acute nonintractable headache, unspecified headache type: complicated acute illness or injury  Ketonuria: complicated acute illness or injury  Renal insufficiency: complicated acute illness or injury    Amount and/or Complexity of Data Reviewed  External Data Reviewed: notes.  Labs: ordered. Decision-making details documented in ED Course.  Radiology: ordered and independent interpretation performed. Decision-making details documented  in ED Course.    Risk  Prescription drug management.        Final diagnoses:   Acute nonintractable headache, unspecified headache type   Renal insufficiency   Ketonuria       ED Disposition  ED Disposition       ED Disposition   Discharge    Condition   Stable    Comment   --               DISCHARGE    Patient discharged in stable condition.    Reviewed implications of results, diagnosis, meds, responsibility to follow up, warning signs and symptoms of possible worsening, potential complications and reasons to return to ER.    Patient/Family voiced understanding of above instructions.    Discussed plan for discharge, as there is no emergent indication for admission.  Pt/family is agreeable and understands need for follow up and possible repeat testing.  Pt/family is aware that discharge does not mean that nothing is wrong but that it indicates no emergency is currently present that requires admission and they must continue care with follow-up as given below or with a physician of their choice.     FOLLOW-UP  Chago Brandon MD  [address] [place] [id number] [phone]    Schedule an appointment as soon as possible for a visit       [facility] EMERGENCY DEPARTMENT  [address] [facility] [address] [phone]    If symptoms worsen    [facility] NEUROLOGY  [address]  [address]  [facility] [address] [phone]  Schedule an appointment as soon as possible for a visit            Medication List      No changes were made to your prescriptions during this visit.            Danny Toth DO  02/22/25 2011

## 2025-02-20 ENCOUNTER — APPOINTMENT (OUTPATIENT)
Facility: HOSPITAL | Age: 65
End: 2025-02-20
Payer: COMMERCIAL

## 2025-02-20 ENCOUNTER — HOSPITAL ENCOUNTER (EMERGENCY)
Facility: HOSPITAL | Age: 65
Discharge: HOME | End: 2025-02-21
Attending: STUDENT IN AN ORGANIZED HEALTH CARE EDUCATION/TRAINING PROGRAM
Payer: COMMERCIAL

## 2025-02-20 VITALS
RESPIRATION RATE: 19 BRPM | DIASTOLIC BLOOD PRESSURE: 82 MMHG | TEMPERATURE: 98.2 F | WEIGHT: 199.5 LBS | HEIGHT: 62 IN | BODY MASS INDEX: 36.71 KG/M2 | SYSTOLIC BLOOD PRESSURE: 135 MMHG | HEART RATE: 79 BPM | OXYGEN SATURATION: 99 %

## 2025-02-20 VITALS
BODY MASS INDEX: 36.62 KG/M2 | HEART RATE: 87 BPM | OXYGEN SATURATION: 97 % | WEIGHT: 199 LBS | TEMPERATURE: 98.3 F | RESPIRATION RATE: 18 BRPM | DIASTOLIC BLOOD PRESSURE: 82 MMHG | HEIGHT: 62 IN | SYSTOLIC BLOOD PRESSURE: 135 MMHG

## 2025-02-20 DIAGNOSIS — R51.9 NONINTRACTABLE HEADACHE, UNSPECIFIED CHRONICITY PATTERN, UNSPECIFIED HEADACHE TYPE: ICD-10-CM

## 2025-02-20 DIAGNOSIS — I71.22 ANEURYSM OF AORTIC ARCH WITHOUT RUPTURE: Primary | ICD-10-CM

## 2025-02-20 DIAGNOSIS — R03.0 BLOOD PRESSURE ELEVATED WITHOUT HISTORY OF HTN: ICD-10-CM

## 2025-02-20 LAB
ALBUMIN SERPL-MCNC: 3.9 G/DL (ref 3.5–5.2)
ALBUMIN/GLOB SERPL: 1.3 G/DL
ALP SERPL-CCNC: 101 U/L (ref 39–117)
ALT SERPL W P-5'-P-CCNC: 14 U/L (ref 1–33)
ANION GAP SERPL CALCULATED.3IONS-SCNC: 11.7 MMOL/L (ref 5–15)
AST SERPL-CCNC: 30 U/L (ref 1–32)
BASOPHILS # BLD AUTO: 0.01 10*3/MM3 (ref 0–0.2)
BASOPHILS NFR BLD AUTO: 0.1 % (ref 0–1.5)
BILIRUB SERPL-MCNC: 0.3 MG/DL (ref 0–1.2)
BUN SERPL-MCNC: 14 MG/DL (ref 8–23)
BUN/CREAT SERPL: 14.3 (ref 7–25)
CALCIUM SPEC-SCNC: 8.9 MG/DL (ref 8.6–10.5)
CHLORIDE SERPL-SCNC: 106 MMOL/L (ref 98–107)
CO2 SERPL-SCNC: 23.3 MMOL/L (ref 22–29)
CREAT SERPL-MCNC: 0.98 MG/DL (ref 0.57–1)
DEPRECATED RDW RBC AUTO: 40.5 FL (ref 37–54)
EGFRCR SERPLBLD CKD-EPI 2021: 65 ML/MIN/1.73
EOSINOPHIL # BLD AUTO: 0.15 10*3/MM3 (ref 0–0.4)
EOSINOPHIL NFR BLD AUTO: 1.6 % (ref 0.3–6.2)
ERYTHROCYTE [DISTWIDTH] IN BLOOD BY AUTOMATED COUNT: 12.6 % (ref 12.3–15.4)
GEN 5 1HR TROPONIN T REFLEX: 6 NG/L
GLOBULIN UR ELPH-MCNC: 3.1 GM/DL
GLUCOSE SERPL-MCNC: 97 MG/DL (ref 65–99)
HCT VFR BLD AUTO: 36.7 % (ref 34–46.6)
HGB BLD-MCNC: 12.3 G/DL (ref 12–15.9)
IMM GRANULOCYTES # BLD AUTO: 0.01 10*3/MM3 (ref 0–0.05)
IMM GRANULOCYTES NFR BLD AUTO: 0.1 % (ref 0–0.5)
LYMPHOCYTES # BLD AUTO: 1.41 10*3/MM3 (ref 0.7–3.1)
LYMPHOCYTES NFR BLD AUTO: 15.4 % (ref 19.6–45.3)
MCH RBC QN AUTO: 29 PG (ref 26.6–33)
MCHC RBC AUTO-ENTMCNC: 33.5 G/DL (ref 31.5–35.7)
MCV RBC AUTO: 86.6 FL (ref 79–97)
MONOCYTES # BLD AUTO: 0.85 10*3/MM3 (ref 0.1–0.9)
MONOCYTES NFR BLD AUTO: 9.3 % (ref 5–12)
NEUTROPHILS NFR BLD AUTO: 6.7 10*3/MM3 (ref 1.7–7)
NEUTROPHILS NFR BLD AUTO: 73.5 % (ref 42.7–76)
PLATELET # BLD AUTO: 278 10*3/MM3 (ref 140–450)
PMV BLD AUTO: 10 FL (ref 6–12)
POTASSIUM SERPL-SCNC: 3.9 MMOL/L (ref 3.5–5.2)
PROT SERPL-MCNC: 7 G/DL (ref 6–8.5)
RBC # BLD AUTO: 4.24 10*6/MM3 (ref 3.77–5.28)
SODIUM SERPL-SCNC: 141 MMOL/L (ref 136–145)
TROPONIN T NUMERIC DELTA: -1 NG/L
TROPONIN T SERPL HS-MCNC: 7 NG/L
WBC NRBC COR # BLD AUTO: 9.13 10*3/MM3 (ref 3.4–10.8)

## 2025-02-20 PROCEDURE — 70498 CT ANGIOGRAPHY NECK: CPT

## 2025-02-20 PROCEDURE — 70450 CT HEAD/BRAIN W/O DYE: CPT

## 2025-02-20 PROCEDURE — 96374 THER/PROPH/DIAG INJ IV PUSH: CPT

## 2025-02-20 PROCEDURE — 99285 EMERGENCY DEPT VISIT HI MDM: CPT

## 2025-02-20 PROCEDURE — 80053 COMPREHEN METABOLIC PANEL: CPT | Performed by: PHYSICIAN ASSISTANT

## 2025-02-20 PROCEDURE — 85025 COMPLETE CBC W/AUTO DIFF WBC: CPT | Performed by: PHYSICIAN ASSISTANT

## 2025-02-20 PROCEDURE — 25510000001 IOPAMIDOL PER 1 ML: Performed by: STUDENT IN AN ORGANIZED HEALTH CARE EDUCATION/TRAINING PROGRAM

## 2025-02-20 PROCEDURE — 93005 ELECTROCARDIOGRAM TRACING: CPT | Performed by: PHYSICIAN ASSISTANT

## 2025-02-20 PROCEDURE — 70496 CT ANGIOGRAPHY HEAD: CPT

## 2025-02-20 PROCEDURE — 71045 X-RAY EXAM CHEST 1 VIEW: CPT

## 2025-02-20 PROCEDURE — 36415 COLL VENOUS BLD VENIPUNCTURE: CPT

## 2025-02-20 PROCEDURE — 25010000002 PROCHLORPERAZINE 10 MG/2ML SOLUTION: Performed by: PHYSICIAN ASSISTANT

## 2025-02-20 PROCEDURE — 84484 ASSAY OF TROPONIN QUANT: CPT | Performed by: PHYSICIAN ASSISTANT

## 2025-02-20 RX ORDER — SODIUM CHLORIDE 0.9 % (FLUSH) 0.9 %
10 SYRINGE (ML) INJECTION AS NEEDED
Status: DISCONTINUED | OUTPATIENT
Start: 2025-02-20 | End: 2025-02-21 | Stop reason: HOSPADM

## 2025-02-20 RX ORDER — CLONIDINE HYDROCHLORIDE 0.1 MG/1
0.1 TABLET ORAL 2 TIMES DAILY
Qty: 15 TABLET | Refills: 0 | Status: SHIPPED | OUTPATIENT
Start: 2025-02-20

## 2025-02-20 RX ORDER — IOPAMIDOL 755 MG/ML
100 INJECTION, SOLUTION INTRAVASCULAR
Status: COMPLETED | OUTPATIENT
Start: 2025-02-20 | End: 2025-02-20

## 2025-02-20 RX ORDER — PROCHLORPERAZINE EDISYLATE 5 MG/ML
5 INJECTION INTRAMUSCULAR; INTRAVENOUS ONCE
Status: COMPLETED | OUTPATIENT
Start: 2025-02-20 | End: 2025-02-20

## 2025-02-20 RX ADMIN — IOPAMIDOL 85 ML: 755 INJECTION, SOLUTION INTRAVENOUS at 20:53

## 2025-02-20 RX ADMIN — PROCHLORPERAZINE EDISYLATE 5 MG: 5 INJECTION INTRAMUSCULAR; INTRAVENOUS at 20:37

## 2025-02-21 LAB
QT INTERVAL: 388 MS
QTC INTERVAL: 447 MS

## 2025-02-21 NOTE — FSED PROVIDER NOTE
[place]    EMERGENCY DEPARTMENT ENCOUNTER      Pt Name: Sophie Medina  MRN: [id number]  YOB: 1960  Date of evaluation: 2/20/2025  Provider: Macy Martin PA-C    CHIEF COMPLAINT       Chief Complaint   Patient presents with    Hypertension     C/o having high BP. Denies hx of HTN. Was seen at [facility] last night and was given a cocktail per patient. Headache at 6/10;     Headache       HISTORY OF PRESENT ILLNESS  (Location/Symptom, Timing/Onset, Context/Setting, Quality, Duration, Modifying Factors, Severity.)   Sophie Medina is a 64 y.o. female who presents to the emergency department with a chief complaint of headache, high blood pressure.  Denies any vision changes, numbness, tingling, paresthesias.  She denies any chest pain, shortness of breath, abdominal pain, nausea, vomiting.  She has taken no medication for her symptoms.  She was in the emergency department at [facility] [place] operated by [facility] yesterday for the same thing.  She states the migraine cocktail helped.    [facility]   Nursing notes were reviewed.  REVIEW OF SYSTEMS    (2-9 systems for level 4, 10 or more for level 5)   Review of Systems   Neurological:  Positive for headaches.   All other systems reviewed and are negative.       All systems reviewed and negative except for those discussed in HPI.   PAST MEDICAL HISTORY     Past Medical History:   Diagnosis Date    Asthma     DM (diabetes mellitus), type 2, uncontrolled     Hyperlipidemia     Rheumatoid arthritis        SURGICAL HISTORY       Past Surgical History:   Procedure Laterality Date    HYSTERECTOMY  02/2014       CURRENT MEDICATIONS     No current facility-administered medications for this encounter.    Current Outpatient Medications:     ADVAIR DISKUS 250-50 MCG/DOSE DISKUS, Inhale 1 puff 2 (Two) Times a Day., Disp: , Rfl: 6    alclomethasone (ACLOVATE) 0.05 % cream, Apply  topically to the appropriate area as directed Daily., Disp: , Rfl: 3    atorvastatin (LIPITOR) 20 MG  tablet, TAKE 1 TABLET BY MOUTH DAILY, Disp: 90 tablet, Rfl: 3    Blood Glucose Monitoring Suppl device, Testing once daily, Disp: 1 each, Rfl: 0    cloNIDine (CATAPRES) 0.1 MG tablet, Take 1 tablet by mouth 2 (Two) Times a Day. Indications: Hypertensive Urgency, Disp: 15 tablet, Rfl: 0    estradiol (MINIVELLE, VIVELLE-DOT) 0.1 MG/24HR patch, APPLY 1 PATCH TOPICALLY TWICE A WEEK AS DIRECTED, Disp: , Rfl:     glucose blood test strip, Testing once daily, Disp: 50 each, Rfl: 5    hydroxychloroquine (PLAQUENIL) 200 MG tablet, Take 1 tablet by mouth 2 (Two) Times a Day., Disp: , Rfl: 3    Lancets 33G misc, 1 each Daily., Disp: 50 each, Rfl: 5    levocetirizine (XYZAL) 5 MG tablet, Take 1 tablet by mouth Daily., Disp: , Rfl: 3    lidocaine (XYLOCAINE) 5 % ointment, APPLY TO AFFECTED AREAS AS DIRECTED., Disp: , Rfl: 5    metFORMIN ER (GLUCOPHAGE-XR) 500 MG 24 hr tablet, Take 1 tablet by mouth Daily With Dinner., Disp: 90 tablet, Rfl: 3    Misc Natural Products (TUMERSAID PO), Take 500 mg by mouth., Disp: , Rfl:     PROAIR  (90 BASE) MCG/ACT inhaler, Inhale 2 puffs Every 6 (Six) Hours As Needed., Disp: , Rfl: 6    vitamin E 1000 UNIT capsule, Take 1 capsule by mouth Daily., Disp: , Rfl:     ALLERGIES     Patient has no known allergies.    FAMILY HISTORY       Family History   Problem Relation Age of Onset    Heart attack Mother 84    Hypertension Mother     Aneurysm Mother     Arthritis Father     Diabetes Father     Hypertension Father     Dementia Father     Diabetes Maternal Grandmother     No Known Problems Maternal Grandfather     No Known Problems Paternal Grandmother     No Known Problems Paternal Grandfather         SOCIAL HISTORY       Social History     Socioeconomic History    Marital status:    Tobacco Use    Smoking status: Former     Current packs/day: 0.00     Average packs/day: 0.5 packs/day for 2.0 years (1.0 ttl pk-yrs)     Types: Cigarettes     Start date: 10/19/1995     Quit date:  10/19/1997     Years since quittin.3    Smokeless tobacco: Never   Vaping Use    Vaping status: Never Used   Substance and Sexual Activity    Alcohol use: No    Drug use: No    Sexual activity: Defer       PHYSICAL EXAM    (up to 7 for level 4, 8 or more for level 5)   Physical Exam  Vitals and nursing note reviewed.   Constitutional:       General: She is not in acute distress.     Appearance: Normal appearance. She is obese. She is not ill-appearing, toxic-appearing or diaphoretic.   HENT:      Head: Normocephalic and atraumatic.      Right Ear: External ear normal. There is no impacted cerumen.      Left Ear: External ear normal. There is no impacted cerumen.      Nose: Nose normal.      Mouth/Throat:      Mouth: Mucous membranes are moist.   Eyes:      General: No scleral icterus.        Right eye: No discharge.         Left eye: No discharge.      Extraocular Movements: Extraocular movements intact.      Conjunctiva/sclera: Conjunctivae normal.      Pupils: Pupils are equal, round, and reactive to light.   Cardiovascular:      Rate and Rhythm: Normal rate and regular rhythm.      Pulses: Normal pulses.      Heart sounds: Normal heart sounds.   Pulmonary:      Effort: Pulmonary effort is normal.      Breath sounds: Normal breath sounds.   Abdominal:      General: Abdomen is flat.      Palpations: Abdomen is soft.      Tenderness: There is no abdominal tenderness.   Musculoskeletal:         General: Normal range of motion.      Cervical back: Full passive range of motion without pain, normal range of motion and neck supple.   Skin:     General: Skin is warm and dry.      Capillary Refill: Capillary refill takes less than 2 seconds.      Findings: No bruising or rash.   Neurological:      General: No focal deficit present.      Mental Status: She is alert and oriented to person, place, and time.      GCS: GCS eye subscore is 4. GCS verbal subscore is 5. GCS motor subscore is 6.      Cranial Nerves: Cranial  nerves 2-12 are intact.      Sensory: No sensory deficit.      Motor: No weakness.      Coordination: Coordination normal.      Gait: Gait normal.   Psychiatric:         Mood and Affect: Mood normal.         Behavior: Behavior normal.          DIAGNOSTIC RESULTS     EKG: All EKGs are interpreted by the Emergency Department Physician who either signs or Co-signs this chart in the absence of a cardiologist.    ECG 12 Lead Other; nausea, Hypertensive urgency   Preliminary Result   Test Reason : Other~   Blood Pressure :   */*   mmHG   Vent. Rate :  80 BPM     Atrial Rate :  80 BPM      P-R Int : 172 ms          QRS Dur :  76 ms       QT Int : 388 ms       P-R-T Axes :  44  -6  32 degrees     QTcB Int : 447 ms      Normal sinus rhythm   Cannot rule out Anterior infarct , age undetermined   Abnormal ECG   When compared with ECG of 23-Nov-2024 13:51,   Nonspecific T wave abnormality now evident in Anterior leads      Referred By:            Confirmed By:           RADIOLOGY:   Non-plain film images such as CT, Ultrasound and MRI are read by the radiologist. Plain radiographic images are visualized and preliminarily interpreted by the emergency physician with the below findings:    [x] Radiologist's Report Reviewed:  CT Head Without Contrast   Final Result   No evidence of vessel occlusion or stenosis.. Small aneurysmal outpouching of the aortic arch measuring 0.8 cm normal CT of the head.               Electronically Signed: Chago Felix MD     2/20/2025 9:03 PM EST     Workstation ID: [id number]      CT Angiogram Head   Final Result   No evidence of vessel occlusion or stenosis.. Small aneurysmal outpouching of the aortic arch measuring 0.8 cm normal CT of the head.               Electronically Signed: Chago Felix MD     2/20/2025 9:03 PM EST     Workstation ID: [id number]      CT Angiogram Neck   Final Result   No evidence of vessel occlusion or stenosis.. Small aneurysmal outpouching of the aortic arch measuring 0.8 cm  normal CT of the head.               Electronically Signed: Chago Felix MD     2/20/2025 9:03 PM EST     Workstation ID: [id number]      XR Chest 1 View   Final Result   Impression:   No radiographic evidence of acute cardiopulmonary abnormality.            Electronically Signed: Jatinbrianna Medina     2/20/2025 8:58 PM EST     Workstation ID: [id number]          ED BEDSIDE ULTRASOUND:   Performed by ED Physician - none    LABS:    I have reviewed and interpreted all of the currently available lab results from this visit (if applicable):  Results for orders placed or performed during the hospital encounter of 02/20/25   Comprehensive Metabolic Panel    Collection Time: 02/20/25  8:18 PM    Specimen: Blood   Result Value Ref Range    Glucose 97 65 - 99 mg/dL    BUN 14 8 - 23 mg/dL    Creatinine 0.98 0.57 - 1.00 mg/dL    Sodium 141 136 - 145 mmol/L    Potassium 3.9 3.5 - 5.2 mmol/L    Chloride 106 98 - 107 mmol/L    CO2 23.3 22.0 - 29.0 mmol/L    Calcium 8.9 8.6 - 10.5 mg/dL    Total Protein 7.0 6.0 - 8.5 g/dL    Albumin 3.9 3.5 - 5.2 g/dL    ALT (SGPT) 14 1 - 33 U/L    AST (SGOT) 30 1 - 32 U/L    Alkaline Phosphatase 101 39 - 117 U/L    Total Bilirubin 0.3 0.0 - 1.2 mg/dL    Globulin 3.1 gm/dL    A/G Ratio 1.3 g/dL    BUN/Creatinine Ratio 14.3 7.0 - 25.0    Anion Gap 11.7 5.0 - 15.0 mmol/L    eGFR 65.0 >60.0 mL/min/1.73   High Sensitivity Troponin T    Collection Time: 02/20/25  8:18 PM    Specimen: Blood   Result Value Ref Range    HS Troponin T 7 <14 ng/L   CBC Auto Differential    Collection Time: 02/20/25  8:18 PM    Specimen: Blood   Result Value Ref Range    WBC 9.13 3.40 - 10.80 10*3/mm3    RBC 4.24 3.77 - 5.28 10*6/mm3    Hemoglobin 12.3 12.0 - 15.9 g/dL    Hematocrit 36.7 34.0 - 46.6 %    MCV 86.6 79.0 - 97.0 fL    MCH 29.0 26.6 - 33.0 pg    MCHC 33.5 31.5 - 35.7 g/dL    RDW 12.6 12.3 - 15.4 %    RDW-SD 40.5 37.0 - 54.0 fl    MPV 10.0 6.0 - 12.0 fL    Platelets 278 140 - 450 10*3/mm3    Neutrophil % 73.5 42.7 -  76.0 %    Lymphocyte % 15.4 (L) 19.6 - 45.3 %    Monocyte % 9.3 5.0 - 12.0 %    Eosinophil % 1.6 0.3 - 6.2 %    Basophil % 0.1 0.0 - 1.5 %    Immature Grans % 0.1 0.0 - 0.5 %    Neutrophils, Absolute 6.70 1.70 - 7.00 10*3/mm3    Lymphocytes, Absolute 1.41 0.70 - 3.10 10*3/mm3    Monocytes, Absolute 0.85 0.10 - 0.90 10*3/mm3    Eosinophils, Absolute 0.15 0.00 - 0.40 10*3/mm3    Basophils, Absolute 0.01 0.00 - 0.20 10*3/mm3    Immature Grans, Absolute 0.01 0.00 - 0.05 10*3/mm3   ECG 12 Lead Other; nausea, Hypertensive urgency    Collection Time: 02/20/25  8:28 PM   Result Value Ref Range    QT Interval 388 ms    QTC Interval 447 ms   High Sensitivity Troponin T 1Hr    Collection Time: 02/20/25  9:31 PM    Specimen: Blood   Result Value Ref Range    HS Troponin T 6 <14 ng/L    Troponin T Numeric Delta -1 Abnormal if >/=3 ng/L        All other labs were within normal range or not returned as of this dictation.    EMERGENCY DEPARTMENT COURSE and DIFFERENTIAL DIAGNOSIS/MDM:   Vitals:    Vitals:    02/20/25 2200 02/20/25 2250 02/20/25 2300 02/20/25 2334   BP: 132/81 158/82 160/87 135/82   BP Location:       Patient Position:       Pulse: 72 79 72 79   Resp:       Temp:       TempSrc:       SpO2: 98% 100% 99% 99%   Weight:       Height:                    DIFFERENTIAL DIAGNOSIS    Differential diagnosis considered in the evaluation and treatment of patient include but not limited to: CVA, migraine headache, tension headache, hypertensive urgency.    MDM     Patient is evaluated and labs are drawn.  Aortic arch incidentally had an 8 mm aneurysm on the CTA of the neck.  She will be given an ambulatory referral to cardiothoracic surgery for this.  She is having no chest pain.  Due to this and the elevated blood pressure of systolic 200s that brought her into the emergency department tonight we will give her clonidine to take for blood pressure greater than 160 systolic.  Patient had taken no Motrin or Tylenol for her headache  prior to arrival and is counseled on taking this for her headache prior to taking medicine for her blood pressure.  She is instructed to keep a headache log and a blood pressure log and is also provided with instructions to follow-up with primary care, neurology.  She should return immediately to the emergency department for any new or worsening symptoms.  Patient is pain-free and her blood pressure has been stable throughout the course of her emergency department visit after only administering Compazine.    I had a discussion with the patient/family regarding diagnosis, diagnostic results, treatment plan, and medications.  The patient/family indicated understanding of these instructions.  I spent adequate time at the bedside preceding discharge necessary to personally discuss the aftercare instructions, giving patient education, providing explanations of the results of our evaluations/findings, and my decision making to assure that the patient/family understand the plan of care.  Time was allotted to answer questions at that time and throughout the ED course.  Emphasis was placed on timely follow-up after discharge.  I also discussed the potential for the development of an acute emergent condition requiring further evaluation, admission, or even surgical intervention. I discussed that we found nothing during the visit today indicating the need for further workup, admission, or the presence of an unstable medical condition.  I encouraged the patient to return to the emergency department immediately for ANY concerns, worsening, new complaints, or if symptoms persist and unable to seek follow-up in a timely fashion.  The patient/family expressed understanding and agreement with this plan.  The patient will follow-up with cardiothoracic surgery, neurology, primary care for reevaluation.     MEDICATIONS ADMINISTERED IN ED:  Medications   prochlorperazine (COMPAZINE) injection 5 mg (5 mg Intravenous Given 2/20/25 2037)    iopamidol (ISOVUE-370) 76 % injection 100 mL (85 mL Intravenous Given 2/20/25 2053)       PROCEDURES:  Procedures          CRITICAL CARE TIME    Total Critical Care time was 0 minutes, excluding separately reportable procedures.   There was a high probability of clinically significant/life threatening deterioration in the patient's condition which required my urgent intervention.    FINAL IMPRESSION      1. Aneurysm of aortic arch without rupture    2. Nonintractable headache, unspecified chronicity pattern, unspecified headache type    3. Blood pressure elevated without history of HTN          DISPOSITION/PLAN     ED Disposition       ED Disposition   Discharge    Condition   Stable    Comment   --               PATIENT REFERRED TO:  [facility] EMERGENCY DEPARTMENT [place]  [address]  [facility] [id number] [phone]  Go to   As needed, If symptoms worsen    Chago Brandon MD  [address] [id number]  [phone]    Schedule an appointment as soon as possible for a visit in 1 day      Julián Segundo MD  [address] [facility]  [address]  [facility] [address] [phone]    Schedule an appointment as soon as possible for a visit       Elgin Santiago MD  [address] [facility]  [address] [place], [place] [id number] [phone]    Schedule an appointment as soon as possible for a visit       Elgin Santiago MD  [address] [address]  [phone]            DISCHARGE MEDICATIONS:     Medication List        START taking these medications      cloNIDine 0.1 MG tablet  Commonly known as: CATAPRES  Take 1 tablet by mouth 2 (Two) Times a Day. Indications: Hypertensive Urgency            CONTINUE taking these medications      Advair Diskus 250-50 MCG/DOSE DISKUS  Generic drug: Fluticasone-Salmeterol     alclometasone 0.05 % ointment  Commonly known as: ACLOVATE     atorvastatin 20 MG tablet  Commonly known as: LIPITOR  TAKE 1  TABLET BY MOUTH DAILY     Blood Glucose Monitoring Suppl device  Testing once daily     estradiol 0.1 MG/24HR patch  Commonly known as: VIVELLE-DOT     glucose blood test strip  Testing once daily     hydroxychloroquine 200 MG tablet  Commonly known as: PLAQUENIL     Lancets 33G misc  1 each Daily.     levocetirizine 5 MG tablet  Commonly known as: XYZAL     lidocaine 5 % ointment  Commonly known as: XYLOCAINE     metFORMIN  MG 24 hr tablet  Commonly known as: GLUCOPHAGE-XR  Take 1 tablet by mouth Daily With Dinner.     ProAir  (90 Base) MCG/ACT inhaler  Generic drug: albuterol sulfate HFA     TUMERSAID PO     vitamin E 1000 UNIT capsule               Where to Get Your Medications        These medications were sent to [facility] [address] [place] [address] [phone] [place] [phone] [address] [address]      Phone: [phone]   cloNIDine 0.1 MG tablet         Comment: Please note this report has been produced using speech recognition software.      Macy Martin PA-C

## 2025-02-26 ENCOUNTER — HOSPITAL ENCOUNTER (EMERGENCY)
Facility: HOSPITAL | Age: 65
Discharge: HOME | End: 2025-02-26
Attending: STUDENT IN AN ORGANIZED HEALTH CARE EDUCATION/TRAINING PROGRAM | Admitting: EMERGENCY MEDICINE
Payer: COMMERCIAL

## 2025-02-26 VITALS
HEIGHT: 62 IN | OXYGEN SATURATION: 99 % | HEART RATE: 73 BPM | WEIGHT: 198.41 LBS | RESPIRATION RATE: 18 BRPM | BODY MASS INDEX: 36.51 KG/M2 | SYSTOLIC BLOOD PRESSURE: 170 MMHG | TEMPERATURE: 98.3 F | DIASTOLIC BLOOD PRESSURE: 89 MMHG

## 2025-02-26 DIAGNOSIS — I10 PRIMARY HYPERTENSION: Primary | ICD-10-CM

## 2025-02-26 PROCEDURE — 99282 EMERGENCY DEPT VISIT SF MDM: CPT

## 2025-02-26 RX ORDER — KETOROLAC TROMETHAMINE 15 MG/ML
15 INJECTION, SOLUTION INTRAMUSCULAR; INTRAVENOUS ONCE
Status: DISCONTINUED | OUTPATIENT
Start: 2025-02-26 | End: 2025-02-27 | Stop reason: HOSPADM

## 2025-02-26 RX ORDER — CLONIDINE HYDROCHLORIDE 0.1 MG/1
0.1 TABLET ORAL 3 TIMES DAILY
Qty: 6 TABLET | Refills: 0 | Status: SHIPPED | OUTPATIENT
Start: 2025-02-26

## 2025-02-27 NOTE — FSED PROVIDER NOTE
Subjective  History of Present Illness:    Patient is a 64-year-old female presenting to the emergency department with complaints of hypertension.  She states this is her third emergency department visit for similar complaints over the past week.  She was initially started on clonidine as needed but states she was having frequent episodes of hypertension with associated headache.  At her last ED visit on  she underwent an MRI, as well as a CTA of her head and neck which were normal-appearing.  Her labs at her previous visits have been normal.  She states she is only experiencing a headache and typically only occurs when her blood pressure is elevated.  At current time, she she states the headache is very mild but her blood pressure had improved.  She was started on losartan by her PCP but states she just started it today around 2 PM.  She denies chest pain, shortness of breath, nausea, vomiting, weakness, numbness, tingling.      Nurses Notes reviewed and agree, including vitals, allergies, social history and prior medical history.     REVIEW OF SYSTEMS: All systems reviewed and not pertinent unless noted.  Review of Systems   Neurological:  Positive for headaches.   All other systems reviewed and are negative.      Past Medical History:   Diagnosis Date    Asthma     DM (diabetes mellitus), type 2, uncontrolled     Hyperlipidemia     Rheumatoid arthritis        Allergies:    Patient has no known allergies.      Past Surgical History:   Procedure Laterality Date    HYSTERECTOMY  2014         Social History     Socioeconomic History    Marital status:    Tobacco Use    Smoking status: Former     Current packs/day: 0.00     Average packs/day: 0.5 packs/day for 2.0 years (1.0 ttl pk-yrs)     Types: Cigarettes     Start date: 10/19/1995     Quit date: 10/19/1997     Years since quittin.3    Smokeless tobacco: Never   Vaping Use    Vaping status: Never Used   Substance and Sexual Activity    Alcohol  "use: No    Drug use: No    Sexual activity: Defer         Family History   Problem Relation Age of Onset    Heart attack Mother 84    Hypertension Mother     Aneurysm Mother     Arthritis Father     Diabetes Father     Hypertension Father     Dementia Father     Diabetes Maternal Grandmother     No Known Problems Maternal Grandfather     No Known Problems Paternal Grandmother     No Known Problems Paternal Grandfather        Objective  Physical Exam:  /89   Pulse 73   Temp 98.3 °F (36.8 °C)   Resp 18   Ht 157 cm (61.81\")   Wt 90 kg (198 lb 6.6 oz)   SpO2 99%   BMI 36.51 kg/m²      Physical Exam  Vitals and nursing note reviewed.   Constitutional:       Appearance: Normal appearance. She is normal weight.   HENT:      Head: Normocephalic and atraumatic.   Eyes:      Extraocular Movements: Extraocular movements intact.      Conjunctiva/sclera: Conjunctivae normal.      Pupils: Pupils are equal, round, and reactive to light.   Cardiovascular:      Rate and Rhythm: Normal rate.   Pulmonary:      Effort: Pulmonary effort is normal.   Musculoskeletal:         General: Normal range of motion.      Cervical back: Normal range of motion.   Skin:     General: Skin is warm and dry.   Neurological:      General: No focal deficit present.      Mental Status: She is alert and oriented to person, place, and time. Mental status is at baseline.   Psychiatric:         Mood and Affect: Mood normal.         Behavior: Behavior normal.         Thought Content: Thought content normal.         Judgment: Judgment normal.         Procedures    ED Course:    ED Course as of 02/26/25 2212 Wed Feb 26, 2025 2210 Patient evaluated for hypertension.  She just darted her blood pressure medicine today--losartan.  Upon arrival to ED, she is comfortable and normotensive.  No further intervention was taken at that time.  She was observed in the emergency department and did eventually complain of a headache and was hypertensive with a " systolic BP in the 180s.  She refused any further treatment for headache.  I reviewed her previous imaging and labs, previous CTA and MRI head were negative.  She has thus far had normal lab evaluation.  Advised the patient to continue her losartan as prescribed over the next few days and that she could continue clonidine to 3 times a day if persistently hypertensive but if this was the case, she needed to follow-up with her PCP for losartan adjustment.  She understands and agrees with plan of care.  She is well-appearing with stable vitals at time of discharge [JJ]      ED Course User Index  [JJ] Warren Holden PA-C       Lab Results (last 24 hours)       ** No results found for the last 24 hours. **             No radiology results from the last 24 hrs       MDM        DDX: includes but is not limited to: Presents emergency    Patient arrives POV with vitals interpreted by myself.     Pertinent features from physical exam: No focal neurological deficit.    Diagnostic information from other sources: Previous MRI, CTA head and neck negative.  I reviewed her labs from her previous ED visit, nonactionable.    Interventions / Re-evaluation: Upon arrival to ED, patient had been fairly comfortable and normotensive.  For this reason, no intervention was initially taken and repeat labs were not obtained.  She did become hypertensive with subsequent headache.  I ordered Toradol for pain control and patient refused.    Medications   ketorolac (TORADOL) injection 15 mg (15 mg Intramuscular Not Given 2/26/25 2202)       Results/clinical rationale were discussed with patient    Consultations/Discussion of results with other physicians: Dr. Caldwell    -----  ED Disposition       ED Disposition   Discharge    Condition   Stable    Comment   --             Final diagnoses:   Primary hypertension      Your Follow-Up Providers       Chago Brandon MD In 2 days.    Specialty: Family Medicine  Follow up details: recheck of  symptoms  [address] [facility] [address]  [phone]                       Contact information for after-discharge care    Follow-up information has not been specified.                    Your medication list        CHANGE how you take these medications        Instructions Last Dose Given Next Dose Due   cloNIDine 0.1 MG tablet  Commonly known as: CATAPRES  What changed: Another medication with the same name was added. Make sure you understand how and when to take each.      Take 1 tablet by mouth 2 (Two) Times a Day. Indications: Hypertensive Urgency       cloNIDine 0.1 MG tablet  Commonly known as: CATAPRES  What changed: You were already taking a medication with the same name, and this prescription was added. Make sure you understand how and when to take each.      Take 1 tablet by mouth 3 times a day.              CONTINUE taking these medications        Instructions Last Dose Given Next Dose Due   Advair Diskus 250-50 MCG/DOSE DISKUS  Generic drug: Fluticasone-Salmeterol      Inhale 1 puff 2 (Two) Times a Day.       alclometasone 0.05 % ointment  Commonly known as: ACLOVATE      Apply  topically to the appropriate area as directed Daily.       atorvastatin 20 MG tablet  Commonly known as: LIPITOR      TAKE 1 TABLET BY MOUTH DAILY       Blood Glucose Monitoring Suppl device      Testing once daily       estradiol 0.1 MG/24HR patch  Commonly known as: VIVELLE-DOT      APPLY 1 PATCH TOPICALLY TWICE A WEEK AS DIRECTED       glucose blood test strip      Testing once daily       hydroxychloroquine 200 MG tablet  Commonly known as: PLAQUENIL      Take 1 tablet by mouth 2 (Two) Times a Day.       Lancets 33G misc      1 each Daily.       levocetirizine 5 MG tablet  Commonly known as: XYZAL      Take 1 tablet by mouth Daily.       lidocaine 5 % ointment  Commonly known as: XYLOCAINE      APPLY TO AFFECTED AREAS AS DIRECTED.       metFORMIN  MG 24 hr tablet  Commonly known as: GLUCOPHAGE-XR      Take 1 tablet  by mouth Daily With Dinner.       ProAir  (90 Base) MCG/ACT inhaler  Generic drug: albuterol sulfate HFA      Inhale 2 puffs Every 6 (Six) Hours As Needed.       TUMERSAID PO      Take 500 mg by mouth.       vitamin E 1000 UNIT capsule      Take 1 capsule by mouth Daily.                 Where to Get Your Medications        These medications were sent to [facility] [address] [place], [place] [address] - [phone]  - [phone] [address] [address]      Phone: [phone]   cloNIDine 0.1 MG tablet

## 2025-03-04 LAB
QT INTERVAL: 388 MS
QTC INTERVAL: 447 MS

## 2025-03-11 ENCOUNTER — OFFICE VISIT (OUTPATIENT)
Age: 65
End: 2025-03-11
Payer: COMMERCIAL

## 2025-03-11 VITALS
RESPIRATION RATE: 16 BRPM | TEMPERATURE: 98.8 F | DIASTOLIC BLOOD PRESSURE: 94 MMHG | SYSTOLIC BLOOD PRESSURE: 156 MMHG | WEIGHT: 192.5 LBS | HEIGHT: 62 IN | HEART RATE: 100 BPM | BODY MASS INDEX: 35.43 KG/M2

## 2025-03-11 DIAGNOSIS — M05.9 RHEUMATOID ARTHRITIS WITH POSITIVE RHEUMATOID FACTOR, INVOLVING UNSPECIFIED SITE: ICD-10-CM

## 2025-03-11 DIAGNOSIS — J45.20 MILD INTERMITTENT ASTHMA WITHOUT COMPLICATION: ICD-10-CM

## 2025-03-11 DIAGNOSIS — I10 PRIMARY HYPERTENSION: ICD-10-CM

## 2025-03-11 DIAGNOSIS — I71.22 ANEURYSM OF AORTIC ARCH WITHOUT RUPTURE: ICD-10-CM

## 2025-03-11 DIAGNOSIS — N18.31 STAGE 3A CHRONIC KIDNEY DISEASE: ICD-10-CM

## 2025-03-11 DIAGNOSIS — E11.9 TYPE 2 DIABETES MELLITUS WITHOUT COMPLICATION, WITHOUT LONG-TERM CURRENT USE OF INSULIN: Primary | ICD-10-CM

## 2025-03-11 DIAGNOSIS — E78.2 MIXED HYPERLIPIDEMIA: ICD-10-CM

## 2025-03-11 DIAGNOSIS — N95.1 HOT FLASHES DUE TO MENOPAUSE: ICD-10-CM

## 2025-03-11 PROBLEM — M06.9 RHEUMATOID ARTHRITIS: Status: ACTIVE | Noted: 2018-01-23

## 2025-03-11 PROCEDURE — 82043 UR ALBUMIN QUANTITATIVE: CPT | Performed by: STUDENT IN AN ORGANIZED HEALTH CARE EDUCATION/TRAINING PROGRAM

## 2025-03-11 PROCEDURE — 82570 ASSAY OF URINE CREATININE: CPT | Performed by: STUDENT IN AN ORGANIZED HEALTH CARE EDUCATION/TRAINING PROGRAM

## 2025-03-11 RX ORDER — MELATONIN 3 MG
5 TABLET ORAL DAILY
COMMUNITY

## 2025-03-11 RX ORDER — OLMESARTAN MEDOXOMIL AND HYDROCHLOROTHIAZIDE 20/12.5 20; 12.5 MG/1; MG/1
2 TABLET ORAL DAILY
Qty: 60 TABLET | Refills: 1 | Status: SHIPPED | OUTPATIENT
Start: 2025-03-11 | End: 2025-03-13

## 2025-03-11 NOTE — PROGRESS NOTES
Office Note     Name: Sophie Medina    : 1960     MRN: [id number]     Chief Complaint  Establish Care, Hypertension, and Diabetes    Subjective     History of Present Illness:    History of Present Illness  64-year-old female presents to establish care and discuss hypertension, diabetes, asthma, skin condition, hyperlipidemia, menopausal symptoms, allergies, aortic arch aneurysm, rheumatoid arthritis, and chronic kidney disease.    She has experienced elevated blood pressure, previously managed by a holistic doctor. BP readings were typically normal, occasionally spiking to 139/84 or 87. About 3.5 weeks ago, she had severe headaches and a BP of 200/150 at home. In the ER, her BP initially decreased but then increased again. Despite clonidine, her BP remained elevated overnight. Multiple hospital visits for hypertension. Amlodipine 5 mg from [facility] was ineffective. Severe headaches occur with high BP. Currently on losartan 50 mg in the morning, effective until ~1 PM. Discontinued clonidine and amlodipine. Never prescribed diuretics. Takes DHEA, nattokinase, and fish oil supplements.    Asthma managed with Advair and ProAir as needed. Sees allergist for asthma flares requiring shots.    Skin condition managed with alclometasone since age 18. Without it, she gets facial patches.    Hyperlipidemia managed with atorvastatin.    Uses estradiol patch for menopausal symptoms post-hysterectomy. Follows up with gynecologist yearly. Has had a hysterectomy       Allergies managed with Xyzal.    Diabetes managed with metformin and endocrinology follow-up.    Recent CT scan revealed aortic arch aneurysm. Scheduled for cardiothoracic surgeon next week.    Rheumatoid arthritis managed by [facility]     Upcoming colonoscopy was canceled due to high BP. Mammogram done a month ago.          Past Medical History:   Past Medical History:   Diagnosis Date    Allergic     Seasonal allergies     Asthma     DM (diabetes mellitus), type 2, uncontrolled     Hyperlipidemia     Hypertension 02/2025    Rheumatoid arthritis        Past Surgical History:   Past Surgical History:   Procedure Laterality Date    HYSTERECTOMY  02/2014       Immunizations:   Immunization History   Administered Date(s) Administered    COVID-19 (PFIZER) Purple Cap Monovalent 06/15/2021, 07/06/2021, 01/08/2022    Covid-19 (Pfizer) Gray Cap Monovalent 06/30/2022    Fluzone (or Fluarix & Flulaval for VFC) >6mos 09/19/2019    Hepatitis A 09/19/2019    Influenza, Unspecified 10/26/2021        Medications:     Current Outpatient Medications:     ADVAIR DISKUS 250-50 MCG/DOSE DISKUS, Inhale 1 puff 2 (Two) Times a Day., Disp: , Rfl: 6    alclomethasone (ACLOVATE) 0.05 % cream, Apply  topically to the appropriate area as directed Daily., Disp: , Rfl: 3    atorvastatin (LIPITOR) 20 MG tablet, TAKE 1 TABLET BY MOUTH DAILY, Disp: 90 tablet, Rfl: 3    Blood Glucose Monitoring Suppl device, Testing once daily, Disp: 1 each, Rfl: 0    DHEA 10 MG tablet, Take 5 mg by mouth Daily., Disp: , Rfl:     estradiol (MINIVELLE, VIVELLE-DOT) 0.1 MG/24HR patch, APPLY 1 PATCH TOPICALLY TWICE A WEEK AS DIRECTED, Disp: , Rfl:     glucose blood test strip, Testing once daily, Disp: 50 each, Rfl: 5    hydroxychloroquine (PLAQUENIL) 200 MG tablet, Take 1 tablet by mouth 2 (Two) Times a Day., Disp: , Rfl: 3    Lancets 33G misc, 1 each Daily., Disp: 50 each, Rfl: 5    levocetirizine (XYZAL) 5 MG tablet, Take 1 tablet by mouth Daily., Disp: , Rfl: 3    lidocaine (XYLOCAINE) 5 % ointment, APPLY TO AFFECTED AREAS AS DIRECTED., Disp: , Rfl: 5    metFORMIN ER (GLUCOPHAGE-XR) 500 MG 24 hr tablet, Take 1 tablet by mouth Daily With Dinner., Disp: 90 tablet, Rfl: 3    PROAIR  (90 BASE) MCG/ACT inhaler, Inhale 2 puffs Every 6 (Six) Hours As Needed., Disp: , Rfl: 6    vitamin E 1000 UNIT capsule, Take 1 capsule by mouth Daily., Disp: , Rfl:      "olmesartan-hydrochlorothiazide (BENICAR HCT) 20-12.5 MG per tablet, Take 2 tablets by mouth Daily., Disp: 60 tablet, Rfl: 1    Allergies:   No Known Allergies    Family History:   Family History   Problem Relation Age of Onset    Heart attack Mother 84    Hypertension Mother     Aneurysm Mother     Arthritis Father     Diabetes Father     Hypertension Father     Dementia Father     Diabetes Maternal Grandmother     No Known Problems Maternal Grandfather     No Known Problems Paternal Grandmother     No Known Problems Paternal Grandfather     Hypertension Daughter        Social History:   Social History     Socioeconomic History    Marital status:    Tobacco Use    Smoking status: Former     Current packs/day: 0.00     Average packs/day: 0.5 packs/day for 2.0 years (1.0 ttl pk-yrs)     Types: Cigarettes     Start date: 10/19/1995     Quit date: 10/19/1997     Years since quittin.4     Passive exposure: Past    Smokeless tobacco: Never   Vaping Use    Vaping status: Never Used   Substance and Sexual Activity    Alcohol use: No    Drug use: No    Sexual activity: Not Currently     Partners: Male     Birth control/protection: None, Hysterectomy         Objective     Vital Signs  /94 (BP Location: Left arm, Patient Position: Sitting, Cuff Size: Large Adult)   Pulse 100   Temp 98.8 °F (37.1 °C) (Oral)   Resp 16   Ht 157 cm (61.81\")   Wt 87.3 kg (192 lb 8 oz)   BMI 35.42 kg/m²   Estimated body mass index is 35.42 kg/m² as calculated from the following:    Height as of this encounter: 157 cm (61.81\").    Weight as of this encounter: 87.3 kg (192 lb 8 oz).          Physical Exam  Vitals reviewed.   Constitutional:       Appearance: Normal appearance.   HENT:      Head: Normocephalic and atraumatic.   Cardiovascular:      Rate and Rhythm: Normal rate.   Pulmonary:      Effort: Pulmonary effort is normal. No respiratory distress.   Neurological:      General: No focal deficit present.      Mental " Status: She is alert and oriented to person, place, and time.   Psychiatric:         Mood and Affect: Mood normal.         Behavior: Behavior normal.          Assessment and Plan     Diagnoses and all orders for this visit:    1. Type 2 diabetes mellitus without complication, without long-term current use of insulin (Primary)  -     POC Glycosylated Hemoglobin (Hb A1C)  -     Microalbumin / Creatinine Urine Ratio - Urine, Clean Catch; Future  -     Microalbumin / Creatinine Urine Ratio - Urine, Clean Catch    2. Mild intermittent asthma without complication    3. Mixed hyperlipidemia    4. Primary hypertension  -     olmesartan-hydrochlorothiazide (BENICAR HCT) 20-12.5 MG per tablet; Take 2 tablets by mouth Daily.  Dispense: 60 tablet; Refill: 1    5. Hot flashes due to menopause    6. Aneurysm of aortic arch without rupture    7. Rheumatoid arthritis with positive rheumatoid factor, involving unspecified site    8. Stage 3a chronic kidney disease         Assessment & Plan  Hypertension, uncontrolled   - Losartan 50mg daily is not controlling her BP, likely due to short half life.   Plan;   -Discontinue losartan   - Prescribe olmesartan-hydrochlorothiazide, 2 tablets in the morning  - Contact office if systolic BP >180 for potential additional medication  - Follow up in 1 month     T2DM   -Follows with [facility]   Plan:   - Continue metformin  - Diabetes management can continue with endocrinologist or here  - Conduct urine microalbumin test    Asthma  - Continue Advair and ProAir as needed  - Follows with allergist     Hyperlipidemia  - Continue atorvastatin  - Lipid panel due with next labs     Hot flashes due to menopause   - Continue estradiol patch  - Follows with gynecology   -Her ASCVD risk is elevated, 25%, may need to discuss discontinuing HRT with gynecologist       Aortic arch aneurysm  - Scheduled for CT surgeon evaluation next week    Rheumatoid arthritis  - Continue follow-up with   [facility]    Chronic kidney disease 3a   - Avg GFR 45-60, suspect secondary to HTN and diabetes   - Manage BP and diabetes to prevent further damage    Follow-up  - Follow-up in 1 month        Follow Up  Return in about 1 month (around 4/11/2025) for Follow Up.    Patient or patient representative verbalized consent for the use of Ambient Listening during the visit with  Paola Brito MD for chart documentation. 3/11/2025  14:45 EDT      Paola Brito MD   [facility]  [address] [address] [phone]    Please note that portions of this document may have been completed with a voice recognition program.      At [facility], we believe that sharing information builds trust and better relationships. You are receiving this note because you are receiving care at [facility] or have recently visited. It is possible you will see health information before a provider has talked with you about it. This kind of information can be easy to misunderstand as it is a medical document. It is intended as pohs-ni-jezv communication. It is written in medical language and may contain abbreviations or verbiage that are unfamiliar. It may appear blunt or direct. Medical documents are intended to carry relevant information, facts as evident, and the clinical opinion of the practitioner.  To help you fully understand what it means for your health, we urge you to discuss this note with your provider.

## 2025-03-12 ENCOUNTER — PRIOR AUTHORIZATION (OUTPATIENT)
Age: 65
End: 2025-03-12
Payer: COMMERCIAL

## 2025-03-12 LAB
ALBUMIN UR-MCNC: 2 MG/DL
CREAT UR-MCNC: 182.2 MG/DL
MICROALBUMIN/CREAT UR: 11 MG/G (ref 0–29)

## 2025-03-12 NOTE — TELEPHONE ENCOUNTER
Key: [id number]    Drug:  Olmesartan Medoxomil-HCTZ 20-12.5MG tablets    Your PA has been resolved, no additional PA is required. For further inquiries please contact the number on the back of the member prescription card

## 2025-03-13 ENCOUNTER — RESULTS FOLLOW-UP (OUTPATIENT)
Age: 65
End: 2025-03-13
Payer: COMMERCIAL

## 2025-03-13 RX ORDER — LOSARTAN POTASSIUM 50 MG/1
50 TABLET ORAL 2 TIMES DAILY
Qty: 60 TABLET | Refills: 0 | Status: SHIPPED | OUTPATIENT
Start: 2025-03-13

## 2025-03-13 RX ORDER — HYDROCHLOROTHIAZIDE 25 MG/1
25 TABLET ORAL DAILY
Qty: 30 TABLET | Refills: 5 | Status: SHIPPED | OUTPATIENT
Start: 2025-03-13

## 2025-03-14 NOTE — PROGRESS NOTES
"     [facility] Cardiothoracic Surgery New Patient Office Note     Date of Encounter: 2025     Name: Sophie Medina  : 1960     Referred By: No ref. provider found  PCP: Paola Brito MD    Chief Complaint:    Chief Complaint   Patient presents with    Consult     Referred by Danny Toth DO for small aortic arch aneurysm. Patient has chest pains often. Patient's mother had history of AAA and MI       Subjective      History of Present Illness:    Sophie Medina is a 64 y.o. female referred to CT Surgery for aortic arch aneurysm. PMH: allergies, asthma, type 2 DM (Hgb A1C 6.0), HLD, HTN, and RA. Patient has recently been evaluated in the emergency department on 4 occasions due to uncontrolled hypertension. CT imaging on  demonstrated a small aneurysmal outpouching of the aortic arch measuring 0.8 cm. She has since established care with a new PCP, Dr. Brito, and is working on BP control. She reports a positive family history of aneurysmal disease, mother passed away from MI while hospitalized for AAA repair. She denies a personal history of connective tissue disorder. She reports episodes of chest pain to which she has presented to the ER but has been told \"everything is normal\". Denies abnormal back or scapular pain. She reports she smoked for <2 years and does not use any other form of tobacco.     Review of Systems:  Review of Systems   Constitutional: Negative for chills, decreased appetite, diaphoresis, fever, malaise/fatigue, night sweats, weight gain and weight loss.   HENT:  Negative for hoarse voice.    Eyes:  Negative for blurred vision, double vision and visual disturbance.   Cardiovascular:  Positive for chest pain. Negative for claudication, dyspnea on exertion, irregular heartbeat, leg swelling, near-syncope, orthopnea, palpitations, paroxysmal nocturnal dyspnea and syncope.   Respiratory:  Positive for cough. Negative for hemoptysis, " shortness of breath, sputum production and wheezing.    Hematologic/Lymphatic: Negative for adenopathy and bleeding problem. Does not bruise/bleed easily.   Skin:  Negative for color change, nail changes, poor wound healing and rash.   Musculoskeletal:  Positive for arthritis and back pain. Negative for falls and muscle cramps.   Gastrointestinal:  Negative for abdominal pain, dysphagia and heartburn.   Genitourinary:  Negative for flank pain.   Neurological:  Positive for dizziness, light-headedness and numbness (foot-left). Negative for brief paralysis, disturbances in coordination, focal weakness, headaches, loss of balance, paresthesias, sensory change, vertigo and weakness.   Psychiatric/Behavioral:  Negative for depression and suicidal ideas.    Allergic/Immunologic: Positive for environmental allergies. Negative for persistent infections.       I have reviewed the following portions of the patient's history: problem list, current medications, allergies, past surgical history, past medical history, past social history, past family history, and ROS and confirm it's accurate.    Allergies:  No Known Allergies    Medications:      Current Outpatient Medications:     ADVAIR DISKUS 250-50 MCG/DOSE DISKUS, Inhale 1 puff 2 (Two) Times a Day., Disp: , Rfl: 6    alclomethasone (ACLOVATE) 0.05 % cream, Apply  topically to the appropriate area as directed Daily., Disp: , Rfl: 3    amLODIPine (NORVASC) 5 MG tablet, Take 1 tablet by mouth Daily., Disp: , Rfl:     atorvastatin (LIPITOR) 20 MG tablet, TAKE 1 TABLET BY MOUTH DAILY, Disp: 90 tablet, Rfl: 3    Blood Glucose Monitoring Suppl device, Testing once daily, Disp: 1 each, Rfl: 0    estradiol (MINIVELLE, VIVELLE-DOT) 0.1 MG/24HR patch, APPLY 1 PATCH TOPICALLY TWICE A WEEK AS DIRECTED, Disp: , Rfl:     glucose blood test strip, Testing once daily, Disp: 50 each, Rfl: 5    hydroCHLOROthiazide 25 MG tablet, Take 1 tablet by mouth Daily., Disp: 30 tablet, Rfl: 5     hydroxychloroquine (PLAQUENIL) 200 MG tablet, Take 1 tablet by mouth 2 (Two) Times a Day., Disp: , Rfl: 3    Lancets 33G misc, 1 each Daily., Disp: 50 each, Rfl: 5    levocetirizine (XYZAL) 5 MG tablet, Take 1 tablet by mouth Daily., Disp: , Rfl: 3    lidocaine (XYLOCAINE) 5 % ointment, APPLY TO AFFECTED AREAS AS DIRECTED., Disp: , Rfl: 5    losartan (COZAAR) 50 MG tablet, Take 1 tablet by mouth 2 (Two) Times a Day., Disp: 60 tablet, Rfl: 0    metFORMIN ER (GLUCOPHAGE-XR) 500 MG 24 hr tablet, Take 1 tablet by mouth Daily With Dinner., Disp: 90 tablet, Rfl: 3    Multiple Vitamins-Minerals (ZINC PO), Take  by mouth., Disp: , Rfl:     PROAIR  (90 BASE) MCG/ACT inhaler, Inhale 2 puffs Every 6 (Six) Hours As Needed., Disp: , Rfl: 6    DHEA 10 MG tablet, Take 5 mg by mouth Daily., Disp: , Rfl:     vitamin E 1000 UNIT capsule, Take 1 capsule by mouth Daily., Disp: , Rfl:     History:   Past Medical History:   Diagnosis Date    1987    Seasonal allergies    Asthma     DM (diabetes mellitus), type 2, uncontrolled     Hyperlipidemia     Hypertension 2025    Rheumatoid arthritis        Past Surgical History:   Procedure Laterality Date    CYST REMOVAL      right inguinal region    HYSTERECTOMY  2014       Social History     Socioeconomic History    Marital status:     Number of children: 1   Tobacco Use    Smoking status: Former     Current packs/day: 0.00     Average packs/day: 0.5 packs/day for 2.0 years (1.0 ttl pk-yrs)     Types: Cigarettes     Start date: 10/19/1995     Quit date: 10/19/1997     Years since quittin.4     Passive exposure: Past    Smokeless tobacco: Never   Vaping Use    Vaping status: Never Used   Substance and Sexual Activity    Alcohol use: No    Drug use: No    Sexual activity: Not Currently     Partners: Male     Birth control/protection: None, Hysterectomy        Family History   Problem Relation Age of Onset    Heart attack Mother 84    Hypertension Mother      "Aneurysm Mother     Arthritis Father     Diabetes Father     Dementia Father     Diabetes Maternal Grandmother     No Known Problems Maternal Grandfather     No Known Problems Paternal Grandmother     No Known Problems Paternal Grandfather     Hypertension Daughter        Objective     Imaging/Labs:    CT Angiogram Neck (02/20/2025 20:53) (personally reviewed and agree with radiologist interpretation)  IMPRESSION:  No evidence of vessel occlusion or stenosis.. Small aneurysmal outpouching of the aortic arch measuring 0.8 cm normal CT of the head.  Electronically Signed: Chago Felix MD  2/20/2025           Physical Exam:  Vitals:    03/18/25 1036   BP: 128/80   BP Location: Left arm   Patient Position: Sitting   Temp: 98.2 °F (36.8 °C)   SpO2: 98%   Weight: 88.6 kg (195 lb 6.4 oz)   Height: 157 cm (61.81\")      Body mass index is 35.96 kg/m².  Class 2 Severe Obesity (BMI >=35 and <=39.9). Obesity-related health conditions include the following: hypertension, diabetes mellitus, and dyslipidemias. Obesity is newly identified. BMI is is above average; no BMI management plan is appropriate. We discussed Information on healthy weight added to patient's after visit summary.       Physical Exam  Vitals and nursing note reviewed.   Constitutional:       General: She is not in acute distress.  HENT:      Head: Normocephalic and atraumatic.   Neck:      Vascular: No carotid bruit or JVD.   Cardiovascular:      Rate and Rhythm: Normal rate and regular rhythm.      Pulses:           Radial pulses are 2+ on the right side and 2+ on the left side.        Dorsalis pedis pulses are 2+ on the right side and 2+ on the left side.        Posterior tibial pulses are 2+ on the right side and 2+ on the left side.      Heart sounds: Normal heart sounds. No murmur heard.  Pulmonary:      Effort: Pulmonary effort is normal.      Breath sounds: Normal breath sounds.   Abdominal:      General: There is no abdominal bruit.      Palpations: " There is no pulsatile mass.   Musculoskeletal:      Right lower leg: No edema.      Left lower leg: No edema.   Skin:     General: Skin is warm.      Capillary Refill: Capillary refill takes less than 2 seconds.   Neurological:      Mental Status: She is alert.      GCS: GCS eye subscore is 4. GCS verbal subscore is 5. GCS motor subscore is 6.      Gait: Gait is intact.   Psychiatric:         Attention and Perception: Attention normal.         Behavior: Behavior is cooperative.         Assessment / Plan    Sophie Medina is a 64 y.o. female referred to CT Surgery for aortic arch aneurysm. PMH: allergies, asthma, type 2 DM (Hgb A1C 6.0), HLD, HTN, and RA.     Assessment / Plan:  1. Aneurysm of aortic arch without rupture   CT imaging from ER visits for uncontrolled HTN revealed 0.8 cm aneurysmal outpouching on aortic arch without dissection. Personally reviewed and total aneurysm size including arch 2.6 cm. Discussed repair size typically around 5.5 cm   Working on BP control with PCP, discussed goal of <130/80  Positive family history of aneurysmal disease, mother passed away from MI while hospitalized after AAA repair  Denies personal history of connective tissue disorder  Does report episodes of chest pain which have been evaluated in the ER. Patient does not see cardiologist. Will refer to Dr. Slade as he travels to [place] location  Denies abnormal back or scapular pain  Smoked for <2 years and does not use tobacco, discussed avoidance with aneurysm  Will repeat CTA chest in 6 months to demonstrate stability and also order echocardiogram and US AAA      Patient Education: Continue to avoid tobacco use. Continue to maintain strict BP control with goal <130/80 mmHg.       Follow Up:   Return in about 6 months (around 9/18/2025) for CTA chest, AAA US, and echocardiogram .   Or sooner for any further concerns or worsening sign and symptoms. If unable to reach us in the office please dial 911 or go to  the nearest emergency department.      RENNY Swanson  [facility] Cardiothoracic Surgery    Time Spent: I spent 38 minutes caring for Sophie on this date of service. This time includes time spent by me in the following activities: preparing for the visit, reviewing tests, obtaining and/or reviewing a separately obtained history, performing a medically appropriate examination and/or evaluation, counseling and educating the patient/family/caregiver, ordering medications, tests, or procedures, referring and communicating with other health care professionals, documenting information in the medical record, independently interpreting results and communicating that information with the patient/family/caregiver, and care coordination.

## 2025-03-18 ENCOUNTER — OFFICE VISIT (OUTPATIENT)
Dept: CARDIAC SURGERY | Facility: CLINIC | Age: 65
End: 2025-03-18
Payer: COMMERCIAL

## 2025-03-18 VITALS
OXYGEN SATURATION: 98 % | DIASTOLIC BLOOD PRESSURE: 80 MMHG | BODY MASS INDEX: 35.96 KG/M2 | HEIGHT: 62 IN | SYSTOLIC BLOOD PRESSURE: 128 MMHG | TEMPERATURE: 98.2 F | WEIGHT: 195.4 LBS

## 2025-03-18 DIAGNOSIS — I71.22 ANEURYSM OF AORTIC ARCH WITHOUT RUPTURE: Primary | ICD-10-CM

## 2025-03-18 RX ORDER — AMLODIPINE BESYLATE 5 MG/1
5 TABLET ORAL DAILY
COMMUNITY

## 2025-03-25 DIAGNOSIS — E11.9 TYPE 2 DIABETES MELLITUS WITHOUT COMPLICATION, WITHOUT LONG-TERM CURRENT USE OF INSULIN: ICD-10-CM

## 2025-03-25 RX ORDER — METFORMIN HYDROCHLORIDE 500 MG/1
500 TABLET, EXTENDED RELEASE ORAL
Qty: 90 TABLET | Refills: 3 | Status: SHIPPED | OUTPATIENT
Start: 2025-03-25

## 2025-03-25 NOTE — TELEPHONE ENCOUNTER
Rx Refill Note  Requested Prescriptions     Pending Prescriptions Disp Refills    metFORMIN ER (GLUCOPHAGE-XR) 500 MG 24 hr tablet [Pharmacy Med Name: METFORMIN ER 500MG 24HR TABS] 90 tablet 3     Sig: TAKE 1 TABLET BY MOUTH DAILY WITH DINNER      Last office visit with prescribing clinician: 12/16/2024   Last telemedicine visit with prescribing clinician: Visit date not found   Next office visit with prescribing clinician: 6/16/2025                         Would you like a call back once the refill request has been completed: [] Yes [] No    If the office needs to give you a call back, can they leave a voicemail: [] Yes [] No    Madeline Caldwell MA  03/25/25, 09:13 EDT

## 2025-04-03 ENCOUNTER — PATIENT MESSAGE (OUTPATIENT)
Age: 65
End: 2025-04-03
Payer: COMMERCIAL

## 2025-04-05 DIAGNOSIS — E78.2 MIXED HYPERLIPIDEMIA: ICD-10-CM

## 2025-04-07 RX ORDER — AMLODIPINE BESYLATE 5 MG/1
5 TABLET ORAL DAILY
Qty: 90 TABLET | Refills: 3 | Status: SHIPPED | OUTPATIENT
Start: 2025-04-07 | End: 2025-04-11

## 2025-04-07 RX ORDER — ATORVASTATIN CALCIUM 20 MG/1
20 TABLET, FILM COATED ORAL DAILY
Qty: 90 TABLET | Refills: 3 | Status: SHIPPED | OUTPATIENT
Start: 2025-04-07

## 2025-04-07 NOTE — TELEPHONE ENCOUNTER
Rx Refill Note  Requested Prescriptions     Pending Prescriptions Disp Refills    atorvastatin (LIPITOR) 20 MG tablet [Pharmacy Med Name: ATORVASTATIN 20MG TABLETS] 90 tablet 0     Sig: TAKE 1 TABLET BY MOUTH DAILY      Last office visit with prescribing clinician: 12/16/2024     Next office visit with prescribing clinician: 6/16/2025       Marcia Ndiaye  04/07/25, 08:46 EDT

## 2025-04-11 ENCOUNTER — OFFICE VISIT (OUTPATIENT)
Age: 65
End: 2025-04-11
Payer: COMMERCIAL

## 2025-04-11 VITALS
BODY MASS INDEX: 37.04 KG/M2 | TEMPERATURE: 96.4 F | OXYGEN SATURATION: 99 % | WEIGHT: 201.3 LBS | DIASTOLIC BLOOD PRESSURE: 70 MMHG | SYSTOLIC BLOOD PRESSURE: 106 MMHG | HEART RATE: 98 BPM

## 2025-04-11 DIAGNOSIS — I10 PRIMARY HYPERTENSION: ICD-10-CM

## 2025-04-11 DIAGNOSIS — R35.0 URINARY FREQUENCY: ICD-10-CM

## 2025-04-11 DIAGNOSIS — Z12.11 SCREENING FOR COLON CANCER: Primary | ICD-10-CM

## 2025-04-11 DIAGNOSIS — Z00.00 HEALTHCARE MAINTENANCE: ICD-10-CM

## 2025-04-11 DIAGNOSIS — E11.9 TYPE 2 DIABETES MELLITUS WITHOUT COMPLICATION, WITHOUT LONG-TERM CURRENT USE OF INSULIN: ICD-10-CM

## 2025-04-11 RX ORDER — AMLODIPINE BESYLATE 10 MG/1
10 TABLET ORAL DAILY
Qty: 90 TABLET | Refills: 3 | Status: SHIPPED | OUTPATIENT
Start: 2025-04-11

## 2025-04-11 NOTE — PROGRESS NOTES
Office Note     Name: Sophie Medina    : 1960     MRN: [id number]     Chief Complaint  Hypertension (1 month f/u./Pt concerned about her increased HR) and Urinary Frequency (Since starting new medications)    Subjective     History of Present Illness:    History of Present Illness  64-year-old female here for hypertension follow-up and urinary frequency discussion.    Reports improved BP with home monitoring. Current meds: hydrochlorothiazide 25 mg, losartan 50 mg BID, amlodipine 5 mg. Concerned about amlodipine side effects (swelling, heart attacks).    Increased urinary frequency attributed to hydrochlorothiazide initiation.    Colonoscopy postponed due to elevated BP. Mammogram done a few months ago.    MEDICATIONS  Current: Hydrochlorothiazide, losartan, amlodipine        Past Medical History:   Past Medical History:   Diagnosis Date    Allergic     Seasonal allergies    Aneurysm 2025    Asthma     DM (diabetes mellitus), type 2, uncontrolled     Hyperlipidemia     Hypertension 2025    Rheumatoid arthritis        Past Surgical History:   Past Surgical History:   Procedure Laterality Date    CYST REMOVAL      right inguinal region    HYSTERECTOMY  2014       Immunizations:   Immunization History   Administered Date(s) Administered    COVID-19 (PFIZER) Purple Cap Monovalent 06/15/2021, 2021, 2022    Covid-19 (Pfizer) Gray Cap Monovalent 2022    Fluzone (or Fluarix & Flulaval for VFC) >6mos 2019    Hepatitis A 2019    Influenza, Unspecified 10/26/2021        Medications:     Current Outpatient Medications:     ADVAIR DISKUS 250-50 MCG/DOSE DISKUS, Inhale 1 puff 2 (Two) Times a Day., Disp: , Rfl: 6    alclomethasone (ACLOVATE) 0.05 % cream, Apply  topically to the appropriate area as directed Daily., Disp: , Rfl: 3    atorvastatin (LIPITOR) 20 MG tablet, TAKE 1 TABLET BY MOUTH DAILY, Disp: 90 tablet, Rfl: 3    Blood Glucose Monitoring Suppl device,  Testing once daily, Disp: 1 each, Rfl: 0    estradiol (MINIVELLE, VIVELLE-DOT) 0.1 MG/24HR patch, APPLY 1 PATCH TOPICALLY TWICE A WEEK AS DIRECTED, Disp: , Rfl:     glucose blood test strip, Testing once daily, Disp: 50 each, Rfl: 5    hydroCHLOROthiazide 25 MG tablet, Take 1 tablet by mouth Daily., Disp: 30 tablet, Rfl: 5    hydroxychloroquine (PLAQUENIL) 200 MG tablet, Take 1 tablet by mouth 2 (Two) Times a Day., Disp: , Rfl: 3    Lancets 33G misc, 1 each Daily., Disp: 50 each, Rfl: 5    levocetirizine (XYZAL) 5 MG tablet, Take 1 tablet by mouth Daily., Disp: , Rfl: 3    lidocaine (XYLOCAINE) 5 % ointment, APPLY TO AFFECTED AREAS AS DIRECTED., Disp: , Rfl: 5    losartan (COZAAR) 50 MG tablet, Take 1 tablet by mouth 2 (Two) Times a Day., Disp: 60 tablet, Rfl: 0    metFORMIN ER (GLUCOPHAGE-XR) 500 MG 24 hr tablet, TAKE 1 TABLET BY MOUTH DAILY WITH DINNER, Disp: 90 tablet, Rfl: 3    Multiple Vitamins-Minerals (ZINC PO), Take  by mouth., Disp: , Rfl:     PROAIR  (90 BASE) MCG/ACT inhaler, Inhale 2 puffs Every 6 (Six) Hours As Needed., Disp: , Rfl: 6    amLODIPine (NORVASC) 10 MG tablet, Take 1 tablet by mouth Daily., Disp: 90 tablet, Rfl: 3    Allergies:   No Known Allergies    Family History:   Family History   Problem Relation Age of Onset    Heart attack Mother 84    Hypertension Mother     Aneurysm Mother     Arthritis Father     Diabetes Father     Dementia Father     Diabetes Maternal Grandmother     No Known Problems Maternal Grandfather     No Known Problems Paternal Grandmother     No Known Problems Paternal Grandfather     Hypertension Daughter        Social History:   Social History     Socioeconomic History    Marital status:     Number of children: 1   Tobacco Use    Smoking status: Former     Current packs/day: 0.00     Average packs/day: 0.5 packs/day for 2.0 years (1.0 ttl pk-yrs)     Types: Cigarettes     Start date: 10/19/1995     Quit date: 10/19/1997     Years since quittin.4      "Passive exposure: Past    Smokeless tobacco: Never   Vaping Use    Vaping status: Never Used   Substance and Sexual Activity    Alcohol use: No    Drug use: No    Sexual activity: Not Currently     Partners: Male     Birth control/protection: None, Hysterectomy         Objective     Vital Signs  /70   Pulse 98   Temp 96.4 °F (35.8 °C)   Wt 91.3 kg (201 lb 4.8 oz)   SpO2 99%   BMI 37.04 kg/m²   Estimated body mass index is 37.04 kg/m² as calculated from the following:    Height as of 3/18/25: 157 cm (61.81\").    Weight as of this encounter: 91.3 kg (201 lb 4.8 oz).            Physical Exam  Vitals reviewed.   Constitutional:       Appearance: Normal appearance.   HENT:      Head: Normocephalic and atraumatic.   Cardiovascular:      Rate and Rhythm: Normal rate.   Pulmonary:      Effort: Pulmonary effort is normal. No respiratory distress.   Neurological:      General: No focal deficit present.      Mental Status: She is alert and oriented to person, place, and time.   Psychiatric:         Mood and Affect: Mood normal.         Behavior: Behavior normal.          Assessment and Plan     Diagnoses and all orders for this visit:    1. Screening for colon cancer (Primary)  -     Ambulatory Referral For Screening Colonoscopy    2. Type 2 diabetes mellitus without complication, without long-term current use of insulin  -     Cancel: Hemoglobin A1c; Future  -     Lipid panel; Future    3. Healthcare maintenance  -     Hepatitis C antibody; Future    4. Primary hypertension  -     amLODIPine (NORVASC) 10 MG tablet; Take 1 tablet by mouth Daily.  Dispense: 90 tablet; Refill: 3    5. Urinary frequency         Assessment & Plan  Hypertension, improving  - BP ~145 systolic on home readings, requires further management  - Increase amlodipine to 10 mg at night  - Current meds: hydrochlorothiazide 25 mg, losartan 50 mg BID, amlodipine 5 mg  - Consider reducing hydrochlorothiazide to 12.5 mg if urinary frequency " persists    Urinary frequency  - Likely due to hydrochlorothiazide  - Adjust dosage if bothersome    T2DM, well controlled   - Follows w/ endocrinology     HLD   -On statin   -Lipid panel due with next labs     Healthcare maintenance   - Ordered screening colonoscopy    Follow-up  - Annual exam in 1 month    Follow Up  Return in about 1 month (around 5/11/2025) for Annual physical.    Patient or patient representative verbalized consent for the use of Ambient Listening during the visit with  Paola Brito MD for chart documentation. 4/11/2025  16:29 EDT      Paola Brito MD   [facility]  [address] [address] [phone]    Please note that portions of this document may have been completed with a voice recognition program.      At [facility], we believe that sharing information builds trust and better relationships. You are receiving this note because you are receiving care at [facility] or have recently visited. It is possible you will see health information before a provider has talked with you about it. This kind of information can be easy to misunderstand as it is a medical document. It is intended as [id number] communication. It is written in medical language and may contain abbreviations or verbiage that are unfamiliar. It may appear blunt or direct. Medical documents are intended to carry relevant information, facts as evident, and the clinical opinion of the practitioner.  To help you fully understand what it means for your health, we urge you to discuss this note with your provider.

## 2025-04-30 PROBLEM — M54.2 NECK PAIN: Status: ACTIVE | Noted: 2018-01-23

## 2025-04-30 PROBLEM — E13.9 DIABETES 1.5, MANAGED AS TYPE 2: Status: ACTIVE | Noted: 2024-09-03

## 2025-04-30 PROBLEM — M17.9 KNEE OSTEOARTHRITIS: Status: ACTIVE | Noted: 2019-05-07

## 2025-04-30 PROBLEM — M79.89 SWELLING OF RIGHT UPPER EXTREMITY: Status: ACTIVE | Noted: 2024-03-07

## 2025-04-30 PROBLEM — M25.521 ELBOW PAIN, RIGHT: Status: ACTIVE | Noted: 2024-06-18

## 2025-04-30 PROBLEM — M67.431 GANGLION CYST OF VOLAR ASPECT OF RIGHT WRIST: Status: ACTIVE | Noted: 2018-08-20

## 2025-04-30 PROBLEM — G58.9 ENTRAPMENT NEUROPATHY: Status: ACTIVE | Noted: 2024-06-18

## 2025-05-01 ENCOUNTER — OFFICE VISIT (OUTPATIENT)
Dept: CARDIOLOGY | Facility: CLINIC | Age: 65
End: 2025-05-01
Payer: COMMERCIAL

## 2025-05-01 VITALS
DIASTOLIC BLOOD PRESSURE: 84 MMHG | BODY MASS INDEX: 37.1 KG/M2 | SYSTOLIC BLOOD PRESSURE: 122 MMHG | OXYGEN SATURATION: 98 % | HEART RATE: 99 BPM | HEIGHT: 62 IN | WEIGHT: 201.6 LBS

## 2025-05-01 DIAGNOSIS — R07.2 PRECORDIAL CHEST PAIN: Primary | ICD-10-CM

## 2025-05-01 DIAGNOSIS — I10 PRIMARY HYPERTENSION: ICD-10-CM

## 2025-05-01 DIAGNOSIS — E78.2 MIXED HYPERLIPIDEMIA: ICD-10-CM

## 2025-05-01 PROCEDURE — 93000 ELECTROCARDIOGRAM COMPLETE: CPT | Performed by: INTERNAL MEDICINE

## 2025-05-01 PROCEDURE — 99204 OFFICE O/P NEW MOD 45 MIN: CPT | Performed by: INTERNAL MEDICINE

## 2025-05-01 RX ORDER — METOPROLOL TARTRATE 25 MG/1
200 TABLET, FILM COATED ORAL ONCE
OUTPATIENT
Start: 2025-05-01 | End: 2025-05-01

## 2025-05-01 RX ORDER — METOPROLOL TARTRATE 25 MG/1
100 TABLET, FILM COATED ORAL ONCE
OUTPATIENT
Start: 2025-05-01

## 2025-05-01 RX ORDER — METOPROLOL TARTRATE 100 MG/1
TABLET ORAL
Qty: 2 TABLET | Refills: 0 | Status: SHIPPED | OUTPATIENT
Start: 2025-05-01

## 2025-05-01 RX ORDER — METOPROLOL TARTRATE 25 MG/1
50 TABLET, FILM COATED ORAL ONCE
OUTPATIENT
Start: 2025-05-01

## 2025-05-01 RX ORDER — SODIUM CHLORIDE 0.9 % (FLUSH) 0.9 %
10 SYRINGE (ML) INJECTION EVERY 12 HOURS SCHEDULED
OUTPATIENT
Start: 2025-05-01

## 2025-05-01 RX ORDER — NITROGLYCERIN 0.4 MG/1
0.4 TABLET SUBLINGUAL
OUTPATIENT
Start: 2025-05-01 | End: 2025-05-01

## 2025-05-01 RX ORDER — SODIUM CHLORIDE 9 MG/ML
40 INJECTION, SOLUTION INTRAVENOUS AS NEEDED
OUTPATIENT
Start: 2025-05-01

## 2025-05-01 RX ORDER — METOPROLOL TARTRATE 50 MG
50 TABLET ORAL
OUTPATIENT
Start: 2025-05-01

## 2025-05-01 RX ORDER — NITROGLYCERIN 0.4 MG/1
0.8 TABLET SUBLINGUAL
OUTPATIENT
Start: 2025-05-01

## 2025-05-01 RX ORDER — ASPIRIN 81 MG/1
81 TABLET ORAL DAILY
Qty: 90 TABLET | Refills: 3 | Status: SHIPPED | OUTPATIENT
Start: 2025-05-01

## 2025-05-01 RX ORDER — METOPROLOL TARTRATE 1 MG/ML
5 INJECTION, SOLUTION INTRAVENOUS
OUTPATIENT
Start: 2025-05-01

## 2025-05-01 RX ORDER — IVABRADINE 5 MG/1
15 TABLET, FILM COATED ORAL ONCE
OUTPATIENT
Start: 2025-05-01 | End: 2025-05-01

## 2025-05-01 RX ORDER — METOPROLOL TARTRATE 25 MG/1
150 TABLET, FILM COATED ORAL ONCE
OUTPATIENT
Start: 2025-05-01

## 2025-05-01 RX ORDER — SODIUM CHLORIDE 0.9 % (FLUSH) 0.9 %
10 SYRINGE (ML) INJECTION AS NEEDED
OUTPATIENT
Start: 2025-05-01

## 2025-05-01 NOTE — PROGRESS NOTES
OFFICE VISIT  NOTE  [facility] CARDIOLOGY      Name: Sophie Medina    Date: 2025  MRN:  [id number]  :  1960      REFERRING/PRIMARY PROVIDER:  Paola Brito MD    Chief Complaint   Patient presents with    Precordial chest pain     Pt denies chest pain during triage.    Hypertension       HPI: Sophie Medina is a 64 y.o. female who presents for chest pain.  She describes dull pressure chest pain at rest and exertion, feels like acid reflux but is not after eating.  Ongoing for the past year, EKGs benign, ER visit 3/2025, normal workup.  While she states she was told she had an ascending aortic aneurysm but I do not see a CT of the chest.    Past Medical History:   Diagnosis Date    1987    Seasonal allergies    Aneurysm 2025    Asthma     DM (diabetes mellitus), type 2, uncontrolled     Hyperlipidemia     Hypertension 2025    Rheumatoid arthritis        Past Surgical History:   Procedure Laterality Date    CYST REMOVAL      right inguinal region    HYSTERECTOMY  2014       Social History     Socioeconomic History    Marital status:     Number of children: 1   Tobacco Use    Smoking status: Former     Current packs/day: 0.00     Average packs/day: 0.5 packs/day for 10.0 years (5.0 ttl pk-yrs)     Types: Cigarettes     Start date: 10/19/1987     Quit date: 10/19/1997     Years since quittin.5     Passive exposure: Past    Smokeless tobacco: Never   Vaping Use    Vaping status: Never Used   Substance and Sexual Activity    Alcohol use: No    Drug use: No    Sexual activity: Not Currently     Partners: Male     Birth control/protection: None, Hysterectomy       Family History   Problem Relation Age of Onset    Heart attack Mother 84    Hypertension Mother     Aneurysm Mother     Arthritis Father     Diabetes Father     Dementia Father     Diabetes Maternal Grandmother     No Known Problems Maternal Grandfather     No Known  Problems Paternal Grandmother     No Known Problems Paternal Grandfather     Hypertension Daughter         ROS:   Constitutional no fever,  no weight loss   Skin no rash, no subcutaneous nodules   Otolaryngeal no difficulty swallowing   Cardiovascular See HPI   Pulmonary no cough, no sputum production   Gastrointestinal no constipation, no diarrhea   Genitourinary no dysuria, no hematuria   Hematologic no easy bruisability, no abnormal bleeding   Musculoskeletal no muscle pain   Neurologic no dizziness, no falls         No Known Allergies      Current Outpatient Medications:     ADVAIR DISKUS 250-50 MCG/DOSE DISKUS, Inhale 1 puff 2 (Two) Times a Day., Disp: , Rfl: 6    alclomethasone (ACLOVATE) 0.05 % cream, Apply  topically to the appropriate area as directed Daily., Disp: , Rfl: 3    amLODIPine (NORVASC) 10 MG tablet, Take 1 tablet by mouth Daily., Disp: 90 tablet, Rfl: 3    atorvastatin (LIPITOR) 20 MG tablet, TAKE 1 TABLET BY MOUTH DAILY, Disp: 90 tablet, Rfl: 3    Blood Glucose Monitoring Suppl device, Testing once daily, Disp: 1 each, Rfl: 0    estradiol (MINIVELLE, VIVELLE-DOT) 0.1 MG/24HR patch, APPLY 1 PATCH TOPICALLY TWICE A WEEK AS DIRECTED, Disp: , Rfl:     glucose blood test strip, Testing once daily, Disp: 50 each, Rfl: 5    hydroCHLOROthiazide 25 MG tablet, Take 1 tablet by mouth Daily., Disp: 30 tablet, Rfl: 5    hydroxychloroquine (PLAQUENIL) 200 MG tablet, Take 1 tablet by mouth 2 (Two) Times a Day., Disp: , Rfl: 3    Lancets 33G misc, 1 each Daily., Disp: 50 each, Rfl: 5    levocetirizine (XYZAL) 5 MG tablet, Take 1 tablet by mouth Daily., Disp: , Rfl: 3    lidocaine (XYLOCAINE) 5 % ointment, APPLY TO AFFECTED AREAS AS DIRECTED., Disp: , Rfl: 5    losartan (COZAAR) 50 MG tablet, Take 1 tablet by mouth 2 (Two) Times a Day., Disp: 60 tablet, Rfl: 0    metFORMIN ER (GLUCOPHAGE-XR) 500 MG 24 hr tablet, TAKE 1 TABLET BY MOUTH DAILY WITH DINNER, Disp: 90 tablet, Rfl: 3    Multiple Vitamins-Minerals  "(ZINC PO), Take  by mouth., Disp: , Rfl:     PROAIR  (90 BASE) MCG/ACT inhaler, Inhale 2 puffs Every 6 (Six) Hours As Needed., Disp: , Rfl: 6    aspirin 81 MG EC tablet, Take 1 tablet by mouth Daily., Disp: 90 tablet, Rfl: 3    Vitals:    05/01/25 0959   BP: 122/84   BP Location: Left arm   Patient Position: Sitting   Cuff Size: Adult   Pulse: 99   SpO2: 98%   Weight: 91.4 kg (201 lb 9.6 oz)   Height: 157.5 cm (62\")     Body mass index is 36.87 kg/m².    PHYSICAL EXAM:    General Appearance:   well developed  well nourished  HENT:   oropharynx moist  lips not cyanotic  Neck:  thyroid not enlarged  supple  Respiratory:  no respiratory distress  normal breath sounds  no rales  Cardiovascular:  no jugular venous distention  regular rhythm  apical impulse normal  S1 normal, S2 normal  no S3, no S4   no murmur  no rub, no thrill  carotid pulses normal; no bruit  lower extremity edema: none      Musculoskeletal:  no clubbing of fingers.   normocephalic, head atraumatic  Skin:   warm, dry  Psychiatric:  judgement and insight appropriate  normal mood and affect    RESULTS:     ECG 12 Lead    Date/Time: 5/1/2025 10:16 AM  Performed by: Lawson Slade MD    Authorized by: Lawson Slade MD  Comparison: compared with previous ECG from 2/20/2025  Similar to previous ECG  Rhythm: sinus rhythm  Rate: normal  BPM: 99  QRS axis: normal    Clinical impression: non-specific ECG                Labs:  Lab Results   Component Value Date    CHOL 159 12/11/2023    TRIG 49 12/11/2023    HDL 50 12/11/2023    LDL 99 12/11/2023    AST 30 02/20/2025    ALT 14 02/20/2025     Lab Results   Component Value Date    HGBA1C 6.0 (A) 12/16/2024     No components found for: \"CREATINININE\"  eGFR Non  Am   Date Value Ref Range Status   11/18/2021 53 (L) >60 mL/min/1.73m*2 Final     Comment:     eGFR = estimated GFR; eGFR units = mL/min/1.73 sq meters Chronic Kidney Disease is considered if eGFR <60 mL/min/1.73 sq meters Kidney failure " is considered if eGFR is <15 mL/min/1.73 sq meters. eGFR assumes steady state plasma creatinine concentration; not applicable if renal function is rapidly changing or patient is on dialysis.   08/06/2021 55 (L) >60 mL/min/1.73m*2 Final     Comment:     eGFR = estimated GFR; eGFR units = mL/min/1.73 sq meters Chronic Kidney Disease is considered if eGFR <60 mL/min/1.73 sq meters Kidney failure is considered if eGFR is <15 mL/min/1.73 sq meters. eGFR assumes steady state plasma creatinine concentration; not applicable if renal function is rapidly changing or patient is on dialysis.       Most recent PCP note, imaging tests, and labs reviewed.    ASSESSMENT:  Problem List Items Addressed This Visit       Mixed hyperlipidemia    Primary hypertension    Precordial chest pain - Primary       PLAN:    1.  Chest pain:  Given multiple risk factors and ongoing symptoms we will proceed with coronary CTA    2.  Dyspnea on exertion:  Check echocardiogram    3.  Primary hypertension: Goal blood pressure less than 130/80, well-controlled on current regimen, continue for now.    4.  Possible ascending aortic aneurysm:  Will evaluate acing aorta on coronary CTA for accurate measurements.  Continue good blood pressure control.        Return to clinic in 9 months, or sooner as needed.    Thank you for the opportunity to share in the care of your patient; please do not hesitate to call me with any questions.     Lawson Slade MD, [facility], [facility]  Office: [phone] [address] [place] [address]    05/01/25

## 2025-05-12 RX ORDER — LOSARTAN POTASSIUM 50 MG/1
50 TABLET ORAL 2 TIMES DAILY
Qty: 60 TABLET | Refills: 0 | Status: SHIPPED | OUTPATIENT
Start: 2025-05-12

## 2025-05-19 ENCOUNTER — LAB (OUTPATIENT)
Age: 65
End: 2025-05-19
Payer: COMMERCIAL

## 2025-05-19 ENCOUNTER — OFFICE VISIT (OUTPATIENT)
Age: 65
End: 2025-05-19
Payer: COMMERCIAL

## 2025-05-19 VITALS
HEART RATE: 90 BPM | WEIGHT: 202 LBS | HEIGHT: 62 IN | OXYGEN SATURATION: 99 % | BODY MASS INDEX: 37.17 KG/M2 | DIASTOLIC BLOOD PRESSURE: 68 MMHG | SYSTOLIC BLOOD PRESSURE: 114 MMHG

## 2025-05-19 DIAGNOSIS — M54.50 BACK PAIN, LUMBOSACRAL: ICD-10-CM

## 2025-05-19 DIAGNOSIS — Z00.00 HEALTHCARE MAINTENANCE: ICD-10-CM

## 2025-05-19 DIAGNOSIS — R60.0 PEDAL EDEMA: ICD-10-CM

## 2025-05-19 DIAGNOSIS — Z23 ENCOUNTER FOR IMMUNIZATION: ICD-10-CM

## 2025-05-19 DIAGNOSIS — E66.01 CLASS 2 SEVERE OBESITY DUE TO EXCESS CALORIES WITH SERIOUS COMORBIDITY AND BODY MASS INDEX (BMI) OF 36.0 TO 36.9 IN ADULT: ICD-10-CM

## 2025-05-19 DIAGNOSIS — N17.9 AKI (ACUTE KIDNEY INJURY): ICD-10-CM

## 2025-05-19 DIAGNOSIS — E78.2 MIXED HYPERLIPIDEMIA: Primary | ICD-10-CM

## 2025-05-19 DIAGNOSIS — L20.82 FLEXURAL ECZEMA: ICD-10-CM

## 2025-05-19 DIAGNOSIS — E66.812 CLASS 2 SEVERE OBESITY DUE TO EXCESS CALORIES WITH SERIOUS COMORBIDITY AND BODY MASS INDEX (BMI) OF 36.0 TO 36.9 IN ADULT: ICD-10-CM

## 2025-05-19 DIAGNOSIS — E78.2 MIXED HYPERLIPIDEMIA: ICD-10-CM

## 2025-05-19 DIAGNOSIS — E11.9 TYPE 2 DIABETES MELLITUS WITHOUT COMPLICATION, WITHOUT LONG-TERM CURRENT USE OF INSULIN: ICD-10-CM

## 2025-05-19 LAB
BILIRUB BLD-MCNC: NEGATIVE MG/DL
CLARITY, POC: CLEAR
COLOR UR: YELLOW
EXPIRATION DATE: ABNORMAL
GLUCOSE UR STRIP-MCNC: NEGATIVE MG/DL
KETONES UR QL: NEGATIVE
LEUKOCYTE EST, POC: ABNORMAL
Lab: ABNORMAL
NITRITE UR-MCNC: NEGATIVE MG/ML
PH UR: 6 [PH] (ref 5–8)
PROT UR STRIP-MCNC: NEGATIVE MG/DL
RBC # UR STRIP: NEGATIVE /UL
SP GR UR: 1.02 (ref 1–1.03)
UROBILINOGEN UR QL: NORMAL

## 2025-05-19 PROCEDURE — 99396 PREV VISIT EST AGE 40-64: CPT | Performed by: STUDENT IN AN ORGANIZED HEALTH CARE EDUCATION/TRAINING PROGRAM

## 2025-05-19 PROCEDURE — 90715 TDAP VACCINE 7 YRS/> IM: CPT | Performed by: STUDENT IN AN ORGANIZED HEALTH CARE EDUCATION/TRAINING PROGRAM

## 2025-05-19 PROCEDURE — 90471 IMMUNIZATION ADMIN: CPT | Performed by: STUDENT IN AN ORGANIZED HEALTH CARE EDUCATION/TRAINING PROGRAM

## 2025-05-19 PROCEDURE — 80061 LIPID PANEL: CPT | Performed by: STUDENT IN AN ORGANIZED HEALTH CARE EDUCATION/TRAINING PROGRAM

## 2025-05-19 PROCEDURE — 90472 IMMUNIZATION ADMIN EACH ADD: CPT | Performed by: STUDENT IN AN ORGANIZED HEALTH CARE EDUCATION/TRAINING PROGRAM

## 2025-05-19 PROCEDURE — 86803 HEPATITIS C AB TEST: CPT | Performed by: STUDENT IN AN ORGANIZED HEALTH CARE EDUCATION/TRAINING PROGRAM

## 2025-05-19 PROCEDURE — 80069 RENAL FUNCTION PANEL: CPT | Performed by: STUDENT IN AN ORGANIZED HEALTH CARE EDUCATION/TRAINING PROGRAM

## 2025-05-19 PROCEDURE — 99214 OFFICE O/P EST MOD 30 MIN: CPT | Performed by: STUDENT IN AN ORGANIZED HEALTH CARE EDUCATION/TRAINING PROGRAM

## 2025-05-19 PROCEDURE — 36415 COLL VENOUS BLD VENIPUNCTURE: CPT | Performed by: STUDENT IN AN ORGANIZED HEALTH CARE EDUCATION/TRAINING PROGRAM

## 2025-05-19 PROCEDURE — 90677 PCV20 VACCINE IM: CPT | Performed by: STUDENT IN AN ORGANIZED HEALTH CARE EDUCATION/TRAINING PROGRAM

## 2025-05-19 PROCEDURE — 81003 URINALYSIS AUTO W/O SCOPE: CPT | Performed by: STUDENT IN AN ORGANIZED HEALTH CARE EDUCATION/TRAINING PROGRAM

## 2025-05-19 RX ORDER — CLOBETASOL PROPIONATE 0.5 MG/G
OINTMENT TOPICAL
Qty: 30 G | Refills: 1 | Status: SHIPPED | OUTPATIENT
Start: 2025-05-19

## 2025-05-19 RX ORDER — LIDOCAINE 50 MG/G
1 PATCH TOPICAL EVERY 24 HOURS
Qty: 30 EACH | Refills: 11 | Status: SHIPPED | OUTPATIENT
Start: 2025-05-19

## 2025-05-19 NOTE — PROGRESS NOTES
Annual Health Maintenance Exam      HPI     Ms. Medina is here today for their annual visit, to discuss leg swelling, high blood pressure, rash on her leg, MIRIAN    She lives alone.  She works as a  for her apartment building part-time.  She reports a poor diet, she eats fast food almost every day.  She reports that her back pain limits her from being able to cook because she is unable to stand for long periods of time.  She is not currently exercising at all due to her back pain.  She has not seen a dentist in years.  She has no vision or hearing concerns.  She wears a seatbelt while she drives.  She occasionally texts while driving.    She was a prior tobacco user but quit back in 1988.  No alcohol use.  No drug use.  She is not sexually active.      She reports that ever since increasing her amlodipine from 5 mg to 10 mg she has been having leg swelling which is bothering her because it limits her ability to put her shoes on.  She reports that her blood pressure has not been well-controlled at home.  She states that she has a rash on her right wrist which has been bothering her and is recurrent.        Mood:  PHQ-2 Depression Screening  Little interest or pleasure in doing things? Not at all   Feeling down, depressed, or hopeless? Not at all   PHQ-2 Total Score 0         Past Medical History:  Patient Active Problem List   Diagnosis    Type 2 diabetes mellitus without complication, without long-term current use of insulin    Mixed hyperlipidemia    Mild intermittent asthma without complication    Primary hypertension    Hot flashes due to menopause    Aneurysm of aortic arch without rupture    Rheumatoid arthritis    Stage 3a chronic kidney disease    Vitamin D deficiency    Swelling of right upper extremity    Neck pain    Ganglion cyst of volar aspect of right wrist    Knee osteoarthritis    Insomnia    Entrapment neuropathy    Elevated rheumatoid factor    Elevated liver enzymes    Elbow  pain, right    Diabetes 1.5, managed as type 2    Back pain, lumbosacral    Arthralgia of multiple joints    Precordial chest pain    Class 2 severe obesity due to excess calories with serious comorbidity and body mass index (BMI) of 36.0 to 36.9 in adult        Allergies:  No Known Allergies     Medications:    Current Outpatient Medications:     ADVAIR DISKUS 250-50 MCG/DOSE DISKUS, Inhale 1 puff 2 (Two) Times a Day., Disp: , Rfl: 6    alclomethasone (ACLOVATE) 0.05 % cream, Apply  topically to the appropriate area as directed Daily., Disp: , Rfl: 3    amLODIPine (NORVASC) 10 MG tablet, Take 1 tablet by mouth Daily., Disp: 90 tablet, Rfl: 3    aspirin 81 MG EC tablet, Take 1 tablet by mouth Daily., Disp: 90 tablet, Rfl: 3    atorvastatin (LIPITOR) 20 MG tablet, TAKE 1 TABLET BY MOUTH DAILY, Disp: 90 tablet, Rfl: 3    Blood Glucose Monitoring Suppl device, Testing once daily, Disp: 1 each, Rfl: 0    estradiol (MINIVELLE, VIVELLE-DOT) 0.1 MG/24HR patch, APPLY 1 PATCH TOPICALLY TWICE A WEEK AS DIRECTED, Disp: , Rfl:     glucose blood test strip, Testing once daily, Disp: 50 each, Rfl: 5    hydroCHLOROthiazide 25 MG tablet, Take 1 tablet by mouth Daily., Disp: 30 tablet, Rfl: 5    hydroxychloroquine (PLAQUENIL) 200 MG tablet, Take 1 tablet by mouth 2 (Two) Times a Day., Disp: , Rfl: 3    Lancets 33G misc, 1 each Daily., Disp: 50 each, Rfl: 5    levocetirizine (XYZAL) 5 MG tablet, Take 1 tablet by mouth Daily., Disp: , Rfl: 3    lidocaine (XYLOCAINE) 5 % ointment, APPLY TO AFFECTED AREAS AS DIRECTED., Disp: , Rfl: 5    losartan (COZAAR) 50 MG tablet, Take 1 tablet by mouth twice daily, Disp: 60 tablet, Rfl: 0    metFORMIN ER (GLUCOPHAGE-XR) 500 MG 24 hr tablet, TAKE 1 TABLET BY MOUTH DAILY WITH DINNER, Disp: 90 tablet, Rfl: 3    metoprolol tartrate (LOPRESSOR) 100 MG tablet, Take 100 mg at Bedtime the Night Before Coronary CTA Appointment and In the Morning 1 Hour Prior to Coronary CTA Appointment. Do not take if heart  rate less than 60., Disp: 2 tablet, Rfl: 0    Multiple Vitamins-Minerals (ZINC PO), Take  by mouth., Disp: , Rfl:     PROAIR  (90 BASE) MCG/ACT inhaler, Inhale 2 puffs Every 6 (Six) Hours As Needed., Disp: , Rfl: 6    clobetasol (TEMOVATE) 0.05 % ointment, Apply to affected area BID PRN rash, itching, Disp: 30 g, Rfl: 1    lidocaine (Lidoderm) 5 %, Place 1 patch on the skin as directed by provider Daily. Remove & Discard patch within 12 hours or as directed by MD, Disp: 30 each, Rfl: 11     Immunizations:  Immunization History   Administered Date(s) Administered    COVID-19 (PFIZER) Purple Cap Monovalent 06/15/2021, 07/06/2021, 01/08/2022    Covid-19 (Pfizer) Gray Cap Monovalent 06/30/2022    Fluzone (or Fluarix & Flulaval for VFC) >6mos 09/19/2019    Hepatitis A 09/19/2019    Influenza, Unspecified 10/26/2021        Surgical History:  Past Surgical History:   Procedure Laterality Date    CYST REMOVAL      right inguinal region    HYSTERECTOMY  02/2014        Family History:  Family History   Problem Relation Age of Onset    Heart attack Mother 84    Hypertension Mother     Aneurysm Mother     Arthritis Father     Diabetes Father     Dementia Father     Diabetes Maternal Grandmother     No Known Problems Maternal Grandfather     No Known Problems Paternal Grandmother     No Known Problems Paternal Grandfather     Hypertension Daughter      Any change in family history since last annual visit: yes / no  Any family history of colon cancer, breast cancer, ovarian cancer, early CAD:       The following portions of the patient's chart were reviewed in this encounter and updated as appropriate: Past Medical History, Surgical History, Family History, and Social History    Over the last 2 weeks, how often have you been bothered by any of the following problems?  Little interest or pleasure in doing things: Not at all  Feeling down, depressed, or hopeless: Not at all    Objective      Vitals:    05/19/25 1421   BP:  114/68   Pulse: 90   SpO2: 99%                Physical Exam  Vitals reviewed.   Constitutional:       General: She is not in acute distress.     Appearance: Normal appearance. She is not ill-appearing.   Neck:      Comments: No goiter   Cardiovascular:      Rate and Rhythm: Normal rate and regular rhythm.      Pulses: Normal pulses.      Heart sounds: Normal heart sounds. No murmur heard.  Pulmonary:      Effort: Pulmonary effort is normal. No respiratory distress.      Breath sounds: Normal breath sounds.   Abdominal:      General: There is no distension.      Palpations: Abdomen is soft. There is no mass.      Tenderness: There is no abdominal tenderness. There is no guarding.   Musculoskeletal:      Right lower leg: No edema.      Left lower leg: No edema.   Lymphadenopathy:      Cervical: No cervical adenopathy.   Skin:     General: Skin is warm and dry.   Neurological:      General: No focal deficit present.      Mental Status: She is alert and oriented to person, place, and time.   Psychiatric:         Mood and Affect: Mood normal.         Behavior: Behavior normal.     }     Diagnoses and all orders for this visit:    1. Mixed hyperlipidemia (Primary)  -     Lipid panel; Future    2. MIRIAN (acute kidney injury)  -     Renal Function Panel; Future  -     POCT urinalysis dipstick, automated    3. Flexural eczema  -     clobetasol (TEMOVATE) 0.05 % ointment; Apply to affected area BID PRN rash, itching  Dispense: 30 g; Refill: 1    4. Encounter for immunization  -     Pneumococcal Conjugate Vaccine 20-Valent (PCV20)  -     Tdap Vaccine => 8yo IM (BOOSTRIX/ADACEL)    5. Back pain, lumbosacral  -     lidocaine (Lidoderm) 5 %; Place 1 patch on the skin as directed by provider Daily. Remove & Discard patch within 12 hours or as directed by MD  Dispense: 30 each; Refill: 11    6. Pedal edema    7. Class 2 severe obesity due to excess calories with serious comorbidity and body mass index (BMI) of 36.0 to 36.9 in  adult      Eczema   -Start clobetasol     Back pain   -Lidocaine patch     HTN, uncontrolled   Pedal edema, due to medication side effect  - Her blood pressures at home have been running in the 140-160 systolic range with occasional normal blood pressures.  Her blood pressure today in clinic is 114/68.  She reports compliance with her medications.  Plan:   - Discussed options for her leg swelling including wearing compression socks.  Also discussed that we could go down on her amlodipine to 5 mg and add on carvedilol for her blood pressure.  Also discussed that we could change her amlodipine to nifedipine to see if that improves her leg swelling.  She is going to look up the nifedipine and carvedilol and let me know which 1 she would prefer to try.  -I counseled her that her diet, eating fast food every day, is likely contributing to her uncontrollable high blood pressure.  The amount of salt in fast food is much more than the limit for people with hypertension, 2 g/day.    Sedentary   Obesity, Body mass index is 36.95 kg/m².  - Counseled on the need to reduce fast food in order to help with her obesity  - Counseled on the recommendation of at least 150 minutes of moderate intensity exercise per week for cardiovascular health.  I recommend that she get a gym membership and look into doing water aerobics or swimming given her back pain    MIRIAN   -EGFR around 64 back in 2/2025   -Dropped to 54 in 3/2025   -Most recently 46 in 4/2025   -She denies taking frequent NSAIDS   Plan:   -UA today in clinic with only leukocytes   -Will repeat RFP today   -This could be correlated to starting losartan, if so would need to consider a possible diagnosis of renal artery stenosis     Today was a preventative health visit: Patient was counseled on the following:  Lifestyle Changes: Weight Loss, Diet, Exercise  Safety with driving  Work and Life Balance    Health Maintenance:    Cancer Screening:  Colonoscopy: Advised to call to  schedule, she reports she has the phone number  Mammogram: Up-to-date  Pap: Hysterectomy      Recommended:  Dental Visit           Please note that portions of this document may have been completed with a voice recognition program.      At [facility], we believe that sharing information builds trust and better relationships. You are receiving this note because you are receiving care at [facility] or have recently visited. It is possible you will see health information before a provider has talked with you about it. This kind of information can be easy to misunderstand as it is a medical document. It is intended as [id number] communication. It is written in medical language and may contain abbreviations or verbiage that are unfamiliar. It may appear blunt or direct. Medical documents are intended to carry relevant information, facts as evident, and the clinical opinion of the practitioner.  To help you fully understand what it means for your health, we urge you to discuss this note with your provider.

## 2025-05-19 NOTE — LETTER
[facility]  Vaccine Consent Form    Patient Name:  Sophie Medina  Patient :  1960     Vaccine(s) Ordered    Pneumococcal Conjugate Vaccine 20-Valent (PCV20)  Tdap Vaccine => 6yo IM (BOOSTRIX/ADACEL)        Screening Checklist  The following questions should be completed prior to vaccination. If you answer “yes” to any question, it does not necessarily mean you should not be vaccinated. It just means we may need to clarify or ask more questions. If a question is unclear, please ask your healthcare provider to explain it.    Yes No   Any fever or moderate to severe illness today (mild illness and/or antibiotic treatment are not contraindications)?     Do you have a history of a serious reaction to any previous vaccinations, such as anaphylaxis, encephalopathy within 7 days, Guillain-[place] syndrome within 6 weeks, seizure?     Have you received any live vaccine(s) (e.g MMR, DIAZ) or any other vaccines in the last month (to ensure duplicate doses aren't given)?     Do you have an anaphylactic allergy to latex (DTaP, DTaP-IPV, Hep A, Hep B, MenB, RV, Td, Tdap), baker’s yeast (Hep B, HPV), polysorbates (RSV, nirsevimab, PCV 20, Rotavirrus, Tdap, Shingrix), or gelatin (DIAZ, MMR)?     Do you have an anaphylactic allergy to neomycin (Rabies, DIAZ, MMR, IPV, Hep A), polymyxin B (IPV), or streptomycin (IPV)?      Any cancer, leukemia, AIDS, or other immune system disorder? (DIAZ, MMR, RV)     Do you have a parent, brother, or sister with an immune system problem (if immune competence of vaccine recipient clinically verified, can proceed)? (MMR, DIAZ)     Any recent steroid treatments for >2 weeks, chemotherapy, or radiation treatment? (DIAZ, MMR)     Have you received antibody-containing blood transfusions or IVIG in the past 11 months (recommended interval is dependent on product)? (MMR, DIAZ)     Have you taken antiviral drugs (acyclovir, famciclovir, valacyclovir for DIAZ) in the last 24 or 48 hours,  "respectively?      Are you pregnant or planning to become pregnant within 1 month? (DIAZ, MMR, HPV, IPV, MenB, Abrexvy; For Hep B- refer to Engerix-B; For RSV - Abrysvo is indicated for 32-36 weeks of pregnancy from September to January)     For infants, have you ever been told your child has had intussusception or a medical emergency involving obstruction of the intestine (Rotavirus)? If not for an infant, can skip this question.         *Ordering Physicians/APC should be consulted if \"yes\" is checked by the patient or guardian above.  I have received, read, and understand the Vaccine Information Statement (VIS) for each vaccine ordered.  I have considered my or my child's health status as well as the health status of my close contacts.  I have taken the opportunity to discuss my vaccine questions with my or my child's health care provider.   I have requested that the ordered vaccine(s) be given to me or my child.  I understand the benefits and risks of the vaccines.  I understand that I should remain in the clinic for 15 minutes after receiving the vaccine(s).  _________________________________________________________  Signature of Patient or Parent/Legal Guardian ____________________  Date     "

## 2025-05-19 NOTE — PROGRESS NOTES
Annual Health Maintenance Exam      HPI     Ms. Medina is here today for their annual visit.     History of Present Illness      Social History:  Household Members: Spouse / Children / Parent  Marital Status: Unmarried /  /  /  /   Housing Situation: Stable  /  Unstable  Work Status: Employed Full Time / Employed Part Time / Unemployed  / Retired / Disability / Student  Occupation:  Hobbies/ Travel:    General Health:  Diet:   Caffeine:  Calcium Intake:  Weight Concerns:  Supplements:  Exercise:    Dental Concerns:    Vision Concerns:    Hearing Loss:    Risky Behaviors:  Seatbelt Use:  Texting While Driving:    Tobacco Use:  If positive:     pack years    Alcohol Use:  If positive, consider AUDITC    Drug Use:  If positive, consider DAST    Reproductive Health:  LMP:  Sexually Active:  Contraception:       Menstrual Status:   Premenopausal:   - Menstrual Concerns:   Perimenopausal:    - Menopause Symptoms: hot flash / night sweats / vaginal dryness / pain with intercourse / insomnia / mood swings  Postmenopausal:  - Incontinence:     Intimate Partner Violence:      Mood:  PHQ-2 Depression Screening  Little interest or pleasure in doing things? Not at all   Feeling down, depressed, or hopeless? Not at all   PHQ-2 Total Score 0       Past Medical History:  Patient Active Problem List   Diagnosis   • Type 2 diabetes mellitus without complication, without long-term current use of insulin   • Mixed hyperlipidemia   • Mild intermittent asthma without complication   • Primary hypertension   • Hot flashes due to menopause   • Aneurysm of aortic arch without rupture   • Rheumatoid arthritis   • Stage 3a chronic kidney disease   • Vitamin D deficiency   • Swelling of right upper extremity   • Neck pain   • Ganglion cyst of volar aspect of right wrist   • Knee osteoarthritis   • Insomnia   • Entrapment neuropathy   • Elevated rheumatoid factor   • Elevated liver enzymes   • Elbow pain, right    • Diabetes 1.5, managed as type 2   • Back pain, lumbosacral   • Arthralgia of multiple joints   • Precordial chest pain        Allergies:  No Known Allergies     Medications:    Current Outpatient Medications:   •  ADVAIR DISKUS 250-50 MCG/DOSE DISKUS, Inhale 1 puff 2 (Two) Times a Day., Disp: , Rfl: 6  •  alclomethasone (ACLOVATE) 0.05 % cream, Apply  topically to the appropriate area as directed Daily., Disp: , Rfl: 3  •  amLODIPine (NORVASC) 10 MG tablet, Take 1 tablet by mouth Daily., Disp: 90 tablet, Rfl: 3  •  aspirin 81 MG EC tablet, Take 1 tablet by mouth Daily., Disp: 90 tablet, Rfl: 3  •  atorvastatin (LIPITOR) 20 MG tablet, TAKE 1 TABLET BY MOUTH DAILY, Disp: 90 tablet, Rfl: 3  •  Blood Glucose Monitoring Suppl device, Testing once daily, Disp: 1 each, Rfl: 0  •  estradiol (MINIVELLE, VIVELLE-DOT) 0.1 MG/24HR patch, APPLY 1 PATCH TOPICALLY TWICE A WEEK AS DIRECTED, Disp: , Rfl:   •  glucose blood test strip, Testing once daily, Disp: 50 each, Rfl: 5  •  hydroCHLOROthiazide 25 MG tablet, Take 1 tablet by mouth Daily., Disp: 30 tablet, Rfl: 5  •  hydroxychloroquine (PLAQUENIL) 200 MG tablet, Take 1 tablet by mouth 2 (Two) Times a Day., Disp: , Rfl: 3  •  Lancets 33G misc, 1 each Daily., Disp: 50 each, Rfl: 5  •  levocetirizine (XYZAL) 5 MG tablet, Take 1 tablet by mouth Daily., Disp: , Rfl: 3  •  lidocaine (XYLOCAINE) 5 % ointment, APPLY TO AFFECTED AREAS AS DIRECTED., Disp: , Rfl: 5  •  losartan (COZAAR) 50 MG tablet, Take 1 tablet by mouth twice daily, Disp: 60 tablet, Rfl: 0  •  metFORMIN ER (GLUCOPHAGE-XR) 500 MG 24 hr tablet, TAKE 1 TABLET BY MOUTH DAILY WITH DINNER, Disp: 90 tablet, Rfl: 3  •  metoprolol tartrate (LOPRESSOR) 100 MG tablet, Take 100 mg at Bedtime the Night Before Coronary CTA Appointment and In the Morning 1 Hour Prior to Coronary CTA Appointment. Do not take if heart rate less than 60., Disp: 2 tablet, Rfl: 0  •  Multiple Vitamins-Minerals (ZINC PO), Take  by mouth., Disp: , Rfl:    •  PROAIR  (90 BASE) MCG/ACT inhaler, Inhale 2 puffs Every 6 (Six) Hours As Needed., Disp: , Rfl: 6  •  clobetasol (TEMOVATE) 0.05 % ointment, Apply to affected area BID PRN rash, itching, Disp: 30 g, Rfl: 1     Immunizations:  Immunization History   Administered Date(s) Administered   • COVID-19 (PFIZER) Purple Cap Monovalent 06/15/2021, 07/06/2021, 01/08/2022   • Covid-19 (Pfizer) Gray Cap Monovalent 06/30/2022   • Fluzone (or Fluarix & Flulaval for VFC) >6mos 09/19/2019   • Hepatitis A 09/19/2019   • Influenza, Unspecified 10/26/2021        Surgical History:  Past Surgical History:   Procedure Laterality Date   • CYST REMOVAL      right inguinal region   • HYSTERECTOMY  02/2014        Family History:  Family History   Problem Relation Age of Onset   • Heart attack Mother 84   • Hypertension Mother    • Aneurysm Mother    • Arthritis Father    • Diabetes Father    • Dementia Father    • Diabetes Maternal Grandmother    • No Known Problems Maternal Grandfather    • No Known Problems Paternal Grandmother    • No Known Problems Paternal Grandfather    • Hypertension Daughter        The following portions of the patient's chart were reviewed in this encounter and updated as appropriate: Past Medical History, Surgical History, Family History, and Social History    Over the last 2 weeks, how often have you been bothered by any of the following problems?  Little interest or pleasure in doing things: Not at all  Feeling down, depressed, or hopeless: Not at all    Objective      Vitals:    05/19/25 1421   BP: 114/68   Pulse: 90   SpO2: 99%                Physical Exam}     Diagnoses and all orders for this visit:    1. Mixed hyperlipidemia (Primary)  -     Lipid panel; Future    2. MIRIAN (acute kidney injury)  -     Renal Function Panel; Future  -     POCT urinalysis dipstick, automated    3. Flexural eczema  -     clobetasol (TEMOVATE) 0.05 % ointment; Apply to affected area BID PRN rash, itching  Dispense: 30 g;  Refill: 1        Assessment & Plan      Today was a preventative health visit: Patient was counseled on the following:  Lifestyle Changes: Weight Loss, Diet, Exercise  Calcium and Vitamin D Supplementation and Weight Bearing Exercise for Bone Health  Reproductive Health, contraception, safe sex, healthy relationships  Safety with driving  Vaccinations    Cancer Screening:  Colonoscopy: start at age 47yo  Mammogram: start at age 40yo   Pap:   Lung Cancer Screening:     Recommended: Hearing Evaluation / Dental Visit / Eye Exam    Bone Health: Recommended DEXA and appropriate vitamin D and Calcium intake    Advance Care Planning   {Advance Directive Status:16689}            {DOUG [employer] Provider Statement:58622}      Please note that portions of this document may have been completed with a voice recognition program.      At [facility], we believe that sharing information builds trust and better relationships. You are receiving this note because you are receiving care at [facility] or have recently visited. It is possible you will see health information before a provider has talked with you about it. This kind of information can be easy to misunderstand as it is a medical document. It is intended as iubj-yh-zwiz communication. It is written in medical language and may contain abbreviations or verbiage that are unfamiliar. It may appear blunt or direct. Medical documents are intended to carry relevant information, facts as evident, and the clinical opinion of the practitioner.  To help you fully understand what it means for your health, we urge you to discuss this note with your provider.

## 2025-05-20 ENCOUNTER — PRIOR AUTHORIZATION (OUTPATIENT)
Age: 65
End: 2025-05-20
Payer: COMMERCIAL

## 2025-05-20 LAB
ALBUMIN SERPL-MCNC: 4.4 G/DL (ref 3.5–5.2)
ANION GAP SERPL CALCULATED.3IONS-SCNC: 11 MMOL/L (ref 5–15)
BUN SERPL-MCNC: 20 MG/DL (ref 8–23)
BUN/CREAT SERPL: 16.9 (ref 7–25)
CALCIUM SPEC-SCNC: 9.9 MG/DL (ref 8.6–10.5)
CHLORIDE SERPL-SCNC: 104 MMOL/L (ref 98–107)
CHOLEST SERPL-MCNC: 169 MG/DL (ref 0–200)
CO2 SERPL-SCNC: 23 MMOL/L (ref 22–29)
CREAT SERPL-MCNC: 1.18 MG/DL (ref 0.57–1)
EGFRCR SERPLBLD CKD-EPI 2021: 52 ML/MIN/1.73
GLUCOSE SERPL-MCNC: 115 MG/DL (ref 65–99)
HCV AB SER QL: NORMAL
HDLC SERPL-MCNC: 56 MG/DL (ref 40–60)
LDLC SERPL CALC-MCNC: 102 MG/DL (ref 0–100)
LDLC/HDLC SERPL: 1.81 {RATIO}
PHOSPHATE SERPL-MCNC: 2.5 MG/DL (ref 2.5–4.5)
POTASSIUM SERPL-SCNC: 4.3 MMOL/L (ref 3.5–5.2)
SODIUM SERPL-SCNC: 138 MMOL/L (ref 136–145)
TRIGL SERPL-MCNC: 58 MG/DL (ref 0–150)
VLDLC SERPL-MCNC: 11 MG/DL (ref 5–40)

## 2025-05-21 NOTE — TELEPHONE ENCOUNTER
PA denied     The request was denied because:  Your plan only covers Lidocaine Patch 5% when it is used for certain health conditions.  Covered uses are: A) pain associated with post-herpetic neuralgia, B) pain associated  with diabetic neuropathy, C) pain associated with cancer-related neuropathy (including  treatment-related neuropathy). Your plan does not cover this drug for your health condition  that your doctor told us you have. We reviewed the information we had. Your request has  been denied. Your doctor can send us any new or missing information for us to review.  For this drug, you may have to meet other criteria. You can request the drug policy for  more details. You can also request other plan documents for your review.

## 2025-06-05 ENCOUNTER — OFFICE VISIT (OUTPATIENT)
Age: 65
End: 2025-06-05
Payer: COMMERCIAL

## 2025-06-05 VITALS
HEIGHT: 62 IN | OXYGEN SATURATION: 100 % | BODY MASS INDEX: 37.54 KG/M2 | HEART RATE: 88 BPM | SYSTOLIC BLOOD PRESSURE: 114 MMHG | WEIGHT: 204 LBS | DIASTOLIC BLOOD PRESSURE: 62 MMHG

## 2025-06-05 DIAGNOSIS — I10 PRIMARY HYPERTENSION: ICD-10-CM

## 2025-06-05 DIAGNOSIS — E11.9 TYPE 2 DIABETES MELLITUS WITHOUT COMPLICATION, WITHOUT LONG-TERM CURRENT USE OF INSULIN: Primary | ICD-10-CM

## 2025-06-05 PROBLEM — E13.9 DIABETES 1.5, MANAGED AS TYPE 2: Status: RESOLVED | Noted: 2024-09-03 | Resolved: 2025-06-05

## 2025-06-05 LAB
EXPIRATION DATE: ABNORMAL
HBA1C MFR BLD: 7 % (ref 4.5–5.7)
Lab: ABNORMAL

## 2025-06-05 RX ORDER — NIFEDIPINE 30 MG/1
30 TABLET, EXTENDED RELEASE ORAL DAILY
Qty: 30 TABLET | Refills: 1 | Status: SHIPPED | OUTPATIENT
Start: 2025-06-05

## 2025-06-05 NOTE — PROGRESS NOTES
Office Note     Name: Sophie Medina    : 1960     MRN: [id number]     Chief Complaint  Hypertension and Joint Swelling    Subjective     History of Present Illness:  History of Present Illness  64-year-old female with hypertension and leg swelling    Reports persistent bilateral ankle swelling. Decided to try nifedipine ER after discussing options    Home BP readings slightly elevated, not monitored regularly.    Recent A1c 7.0. Improving diet, reducing fast food. Under endocrinologist care, initially prescribed two metformin tablets daily, now one tablet daily. Follow-up with endocrinologist on 2025.    Has not scheduled colonoscopy ordered in 2025. Previous colonoscopy normal. No family history of colon cancer.    FAMILY HISTORY  She has no family history of colon cancer.        Past Medical History:   Past Medical History:   Diagnosis Date    Allergic     Seasonal allergies    Aneurysm 2025    Asthma     DM (diabetes mellitus), type 2, uncontrolled     Hyperlipidemia     Hypertension 2025    Rheumatoid arthritis        Past Surgical History:   Past Surgical History:   Procedure Laterality Date    CYST REMOVAL      right inguinal region    HYSTERECTOMY  2014       Immunizations:   Immunization History   Administered Date(s) Administered    COVID-19 (PFIZER) Purple Cap Monovalent 06/15/2021, 2021, 2022    Covid-19 (Pfizer) Gray Cap Monovalent 2022    Fluzone (or Fluarix & Flulaval for VFC) >6mos 2019    Hepatitis A 2019    Influenza, Unspecified 10/26/2021    Pneumococcal Conjugate 20-Valent (PCV20) 2025    Tdap 2025        Medications:     Current Outpatient Medications:     ADVAIR DISKUS 250-50 MCG/DOSE DISKUS, Inhale 1 puff 2 (Two) Times a Day., Disp: , Rfl: 6    alclomethasone (ACLOVATE) 0.05 % cream, Apply  topically to the appropriate area as directed Daily., Disp: , Rfl: 3    aspirin 81 MG EC tablet, Take 1 tablet by  mouth Daily., Disp: 90 tablet, Rfl: 3    atorvastatin (LIPITOR) 20 MG tablet, TAKE 1 TABLET BY MOUTH DAILY, Disp: 90 tablet, Rfl: 3    Blood Glucose Monitoring Suppl device, Testing once daily, Disp: 1 each, Rfl: 0    clobetasol (TEMOVATE) 0.05 % ointment, Apply to affected area BID PRN rash, itching, Disp: 30 g, Rfl: 1    estradiol (MINIVELLE, VIVELLE-DOT) 0.1 MG/24HR patch, APPLY 1 PATCH TOPICALLY TWICE A WEEK AS DIRECTED, Disp: , Rfl:     glucose blood test strip, Testing once daily, Disp: 50 each, Rfl: 5    hydroCHLOROthiazide 25 MG tablet, Take 1 tablet by mouth Daily., Disp: 30 tablet, Rfl: 5    hydroxychloroquine (PLAQUENIL) 200 MG tablet, Take 1 tablet by mouth 2 (Two) Times a Day., Disp: , Rfl: 3    Lancets 33G misc, 1 each Daily., Disp: 50 each, Rfl: 5    levocetirizine (XYZAL) 5 MG tablet, Take 1 tablet by mouth Daily., Disp: , Rfl: 3    lidocaine (Lidoderm) 5 %, Place 1 patch on the skin as directed by provider Daily. Remove & Discard patch within 12 hours or as directed by MD, Disp: 30 each, Rfl: 11    lidocaine (XYLOCAINE) 5 % ointment, APPLY TO AFFECTED AREAS AS DIRECTED., Disp: , Rfl: 5    losartan (COZAAR) 50 MG tablet, Take 1 tablet by mouth twice daily, Disp: 60 tablet, Rfl: 0    metFORMIN ER (GLUCOPHAGE-XR) 500 MG 24 hr tablet, TAKE 1 TABLET BY MOUTH DAILY WITH DINNER, Disp: 90 tablet, Rfl: 3    metoprolol tartrate (LOPRESSOR) 100 MG tablet, Take 100 mg at Bedtime the Night Before Coronary CTA Appointment and In the Morning 1 Hour Prior to Coronary CTA Appointment. Do not take if heart rate less than 60., Disp: 2 tablet, Rfl: 0    Multiple Vitamins-Minerals (ZINC PO), Take  by mouth., Disp: , Rfl:     PROAIR  (90 BASE) MCG/ACT inhaler, Inhale 2 puffs Every 6 (Six) Hours As Needed., Disp: , Rfl: 6    NIFEdipine XL (PROCARDIA XL) 30 MG 24 hr tablet, Take 1 tablet by mouth Daily., Disp: 30 tablet, Rfl: 1    Allergies:   Allergies   Allergen Reactions    Amlodipine Swelling     B/l LE edema     "      Family History:   Family History   Problem Relation Age of Onset    Heart attack Mother 84    Hypertension Mother     Aneurysm Mother     Arthritis Father     Diabetes Father     Dementia Father     Diabetes Maternal Grandmother     No Known Problems Maternal Grandfather     No Known Problems Paternal Grandmother     No Known Problems Paternal Grandfather     Hypertension Daughter        Social History:   Social History     Socioeconomic History    Marital status:     Number of children: 1   Tobacco Use    Smoking status: Former     Current packs/day: 0.00     Average packs/day: 0.5 packs/day for 10.0 years (5.0 ttl pk-yrs)     Types: Cigarettes     Start date: 10/19/1987     Quit date: 10/19/1997     Years since quittin.6     Passive exposure: Past    Smokeless tobacco: Never   Vaping Use    Vaping status: Never Used   Substance and Sexual Activity    Alcohol use: No    Drug use: No    Sexual activity: Not Currently     Partners: Male     Birth control/protection: None, Hysterectomy         Objective     Vital Signs  /62   Pulse 88   Ht 157.5 cm (62\")   Wt 92.5 kg (204 lb)   SpO2 100%   BMI 37.31 kg/m²   Estimated body mass index is 37.31 kg/m² as calculated from the following:    Height as of this encounter: 157.5 cm (62\").    Weight as of this encounter: 92.5 kg (204 lb).            Physical Exam  Vitals reviewed.   Constitutional:       Appearance: Normal appearance.   HENT:      Head: Normocephalic and atraumatic.   Cardiovascular:      Rate and Rhythm: Normal rate.   Pulmonary:      Effort: Pulmonary effort is normal. No respiratory distress.   Musculoskeletal:      Right lower leg: Edema present.      Left lower leg: Edema present.   Neurological:      General: No focal deficit present.      Mental Status: She is alert and oriented to person, place, and time.   Psychiatric:         Mood and Affect: Mood normal.         Behavior: Behavior normal.          Assessment and Plan "     Diagnoses and all orders for this visit:    1. Type 2 diabetes mellitus without complication, without long-term current use of insulin (Primary)  -     POC Glycosylated Hemoglobin (Hb A1C)    2. Primary hypertension    Other orders  -     NIFEdipine XL (PROCARDIA XL) 30 MG 24 hr tablet; Take 1 tablet by mouth Daily.  Dispense: 30 tablet; Refill: 1         Assessment & Plan  Hypertension, uncontrolled   - Home BP slightly elevated  - Initiated nifedipine ER 30 mg daily instead of amlodipine, hopefully nifedipine will not cause her LE edema  - Prescription for nifedipine ER 30 mg to be sent to pharmacy  - Dosage may increase to 60 mg or 90 mg if BP remains high  - Consider switch to carvedilol or spironolactone if ankle swelling persists    T2DM  - A1c increased from 6 in 12/2024 to 7.0  - Goal: maintain A1c at 7 or below  - Continue dietary changes  - Follow up with endocrinologist on 06/16/2025  - Continue metformin as prescribed    Health Maintenance:  - Prefers colonoscopy over Cologuard  - Provide contact information to schedule colonoscopy  - Last colonoscopy normal  - No family history of colon cancer    Follow Up  No follow-ups on file.    Patient or patient representative verbalized consent for the use of Ambient Listening during the visit with  Paola Brito MD for chart documentation. 6/5/2025  12:00 EDT      Paola Brito MD   [facility]  [facility] [place] [address] [address]  [phone]    Please note that portions of this document may have been completed with a voice recognition program.      At [facility], we believe that sharing information builds trust and better relationships. You are receiving this note because you are receiving care at [facility] or have recently visited. It is possible you will see health information before a provider has talked with you about it. This kind of information  can be easy to misunderstand as it is a medical document. It is intended as [id number]-[id number] communication. It is written in medical language and may contain abbreviations or verbiage that are unfamiliar. It may appear blunt or direct. Medical documents are intended to carry relevant information, facts as evident, and the clinical opinion of the practitioner.  To help you fully understand what it means for your health, we urge you to discuss this note with your provider.

## 2025-06-09 RX ORDER — LOSARTAN POTASSIUM 50 MG/1
50 TABLET ORAL 2 TIMES DAILY
Qty: 60 TABLET | Refills: 0 | Status: SHIPPED | OUTPATIENT
Start: 2025-06-09

## 2025-07-07 RX ORDER — LOSARTAN POTASSIUM 50 MG/1
50 TABLET ORAL 2 TIMES DAILY
Qty: 60 TABLET | Refills: 0 | Status: SHIPPED | OUTPATIENT
Start: 2025-07-07

## 2025-07-09 ENCOUNTER — TELEPHONE (OUTPATIENT)
Dept: INFUSION THERAPY | Facility: HOSPITAL | Age: 65
End: 2025-07-09
Payer: COMMERCIAL

## 2025-07-09 NOTE — TELEPHONE ENCOUNTER
Attempted to contact patient as pre-procedure phone call prior to planned CT angiogram coronary planned for 7/11/25. Left voicemail message reminder with arrival time, nothing to eat or drink 2 hours prior to arrival time, no caffeine after midnight the night prior to test, please make sure you take the prescribed premedication for this test as instructed by your physician,  is recommended, and if have any questions may contact outpatient prep and recovery at [phone].

## 2025-07-11 ENCOUNTER — HOSPITAL ENCOUNTER (OUTPATIENT)
Dept: CT IMAGING | Facility: HOSPITAL | Age: 65
Discharge: HOME | End: 2025-07-11
Payer: COMMERCIAL

## 2025-07-11 ENCOUNTER — HOSPITAL ENCOUNTER (OUTPATIENT)
Dept: CARDIOLOGY | Facility: HOSPITAL | Age: 65
Discharge: HOME | End: 2025-07-11
Payer: COMMERCIAL

## 2025-07-11 VITALS
BODY MASS INDEX: 37.5 KG/M2 | SYSTOLIC BLOOD PRESSURE: 106 MMHG | OXYGEN SATURATION: 99 % | TEMPERATURE: 97.3 F | HEART RATE: 65 BPM | WEIGHT: 203.8 LBS | RESPIRATION RATE: 18 BRPM | HEIGHT: 62 IN | DIASTOLIC BLOOD PRESSURE: 63 MMHG

## 2025-07-11 VITALS — BODY MASS INDEX: 37.49 KG/M2 | WEIGHT: 203.71 LBS | HEIGHT: 62 IN

## 2025-07-11 DIAGNOSIS — R07.2 PRECORDIAL CHEST PAIN: ICD-10-CM

## 2025-07-11 DIAGNOSIS — E78.2 MIXED HYPERLIPIDEMIA: ICD-10-CM

## 2025-07-11 DIAGNOSIS — I10 PRIMARY HYPERTENSION: ICD-10-CM

## 2025-07-11 LAB
AORTIC DIMENSIONLESS INDEX: 0.78 (DI)
AV MEAN PRESS GRAD SYS DOP V1V2: 3 MMHG
AV VMAX SYS DOP: 123 CM/SEC
BH CV ECHO MEAS - AO MAX PG: 6.1 MMHG
BH CV ECHO MEAS - AO ROOT DIAM: 3.2 CM
BH CV ECHO MEAS - AO V2 VTI: 27.1 CM
BH CV ECHO MEAS - AVA(I,D): 2.46 CM2
BH CV ECHO MEAS - EDV(CUBED): 140.6 ML
BH CV ECHO MEAS - EDV(MOD-SP2): 66.7 ML
BH CV ECHO MEAS - EDV(MOD-SP4): 89.5 ML
BH CV ECHO MEAS - EF(MOD-SP2): 64 %
BH CV ECHO MEAS - EF(MOD-SP4): 64.2 %
BH CV ECHO MEAS - ESV(CUBED): 9.3 ML
BH CV ECHO MEAS - ESV(MOD-SP2): 24 ML
BH CV ECHO MEAS - ESV(MOD-SP4): 32 ML
BH CV ECHO MEAS - FS: 59.6 %
BH CV ECHO MEAS - IVS/LVPW: 1 CM
BH CV ECHO MEAS - IVSD: 0.8 CM
BH CV ECHO MEAS - LA DIMENSION: 3.7 CM
BH CV ECHO MEAS - LAT PEAK E' VEL: 10 CM/SEC
BH CV ECHO MEAS - LV DIASTOLIC VOL/BSA (35-75): 46.5 CM2
BH CV ECHO MEAS - LV MASS(C)D: 145.2 GRAMS
BH CV ECHO MEAS - LV MAX PG: 3.7 MMHG
BH CV ECHO MEAS - LV MEAN PG: 2 MMHG
BH CV ECHO MEAS - LV SYSTOLIC VOL/BSA (12-30): 16.6 CM2
BH CV ECHO MEAS - LV V1 MAX: 96.1 CM/SEC
BH CV ECHO MEAS - LV V1 VTI: 21.2 CM
BH CV ECHO MEAS - LVIDD: 5.2 CM
BH CV ECHO MEAS - LVIDS: 2.1 CM
BH CV ECHO MEAS - LVOT AREA: 3.1 CM2
BH CV ECHO MEAS - LVOT DIAM: 2 CM
BH CV ECHO MEAS - LVPWD: 0.8 CM
BH CV ECHO MEAS - MED PEAK E' VEL: 8.2 CM/SEC
BH CV ECHO MEAS - MV A MAX VEL: 100 CM/SEC
BH CV ECHO MEAS - MV DEC SLOPE: 381 CM/SEC2
BH CV ECHO MEAS - MV DEC TIME: 0.23 SEC
BH CV ECHO MEAS - MV E MAX VEL: 101 CM/SEC
BH CV ECHO MEAS - MV E/A: 1.01
BH CV ECHO MEAS - MV MAX PG: 4.6 MMHG
BH CV ECHO MEAS - MV MEAN PG: 3 MMHG
BH CV ECHO MEAS - MV P1/2T: 88.4 MSEC
BH CV ECHO MEAS - MV V2 VTI: 37.5 CM
BH CV ECHO MEAS - MVA(P1/2T): 2.49 CM2
BH CV ECHO MEAS - MVA(VTI): 1.78 CM2
BH CV ECHO MEAS - PA ACC TIME: 0.1 SEC
BH CV ECHO MEAS - PI END-D VEL: 141 CM/SEC
BH CV ECHO MEAS - RAP SYSTOLE: 3 MMHG
BH CV ECHO MEAS - RVSP: 22 MMHG
BH CV ECHO MEAS - SV(LVOT): 66.6 ML
BH CV ECHO MEAS - SV(MOD-SP2): 42.7 ML
BH CV ECHO MEAS - SV(MOD-SP4): 57.5 ML
BH CV ECHO MEAS - SVI(LVOT): 34.6 ML/M2
BH CV ECHO MEAS - SVI(MOD-SP2): 22.2 ML/M2
BH CV ECHO MEAS - SVI(MOD-SP4): 29.9 ML/M2
BH CV ECHO MEAS - TAPSE (>1.6): 2.46 CM
BH CV ECHO MEAS - TR MAX PG: 18.8 MMHG
BH CV ECHO MEAS - TR MAX VEL: 216 CM/SEC
BH CV ECHO MEASUREMENTS AVERAGE E/E' RATIO: 11.1
BH CV VAS BP RIGHT ARM: NORMAL MMHG
BH CV XLRA - RV BASE: 3.2 CM
BH CV XLRA - RV LENGTH: 6.9 CM
BH CV XLRA - RV MID: 2.4 CM
BH CV XLRA - TDI S': 14 CM/SEC
GLUCOSE BLDC GLUCOMTR-MCNC: 83 MG/DL (ref 70–130)
IVRT: 85 MS
LEFT ATRIUM VOLUME INDEX: 27.6 ML/M2
LV EF BIPLANE MOD: 64.5 %

## 2025-07-11 PROCEDURE — 93306 TTE W/DOPPLER COMPLETE: CPT

## 2025-07-11 PROCEDURE — 25510000001 IOPAMIDOL PER 1 ML: Performed by: INTERNAL MEDICINE

## 2025-07-11 PROCEDURE — 75574 CT ANGIO HRT W/3D IMAGE: CPT

## 2025-07-11 PROCEDURE — 25810000003 SODIUM CHLORIDE 0.9 % SOLUTION: Performed by: INTERNAL MEDICINE

## 2025-07-11 PROCEDURE — 82948 REAGENT STRIP/BLOOD GLUCOSE: CPT

## 2025-07-11 RX ORDER — IOPAMIDOL 755 MG/ML
65 INJECTION, SOLUTION INTRAVASCULAR
Status: COMPLETED | OUTPATIENT
Start: 2025-07-11 | End: 2025-07-11

## 2025-07-11 RX ORDER — SODIUM CHLORIDE 0.9 % (FLUSH) 0.9 %
10 SYRINGE (ML) INJECTION AS NEEDED
Status: DISCONTINUED | OUTPATIENT
Start: 2025-07-11 | End: 2025-07-12 | Stop reason: HOSPADM

## 2025-07-11 RX ORDER — SODIUM CHLORIDE 0.9 % (FLUSH) 0.9 %
10 SYRINGE (ML) INJECTION EVERY 12 HOURS SCHEDULED
Status: DISCONTINUED | OUTPATIENT
Start: 2025-07-11 | End: 2025-07-12 | Stop reason: HOSPADM

## 2025-07-11 RX ORDER — METOPROLOL TARTRATE 50 MG
50 TABLET ORAL ONCE
Status: DISCONTINUED | OUTPATIENT
Start: 2025-07-11 | End: 2025-07-12 | Stop reason: HOSPADM

## 2025-07-11 RX ORDER — METOPROLOL TARTRATE 100 MG/1
200 TABLET ORAL ONCE
Status: DISCONTINUED | OUTPATIENT
Start: 2025-07-11 | End: 2025-07-12 | Stop reason: HOSPADM

## 2025-07-11 RX ORDER — METOPROLOL TARTRATE 1 MG/ML
5 INJECTION, SOLUTION INTRAVENOUS
Status: DISCONTINUED | OUTPATIENT
Start: 2025-07-11 | End: 2025-07-12 | Stop reason: HOSPADM

## 2025-07-11 RX ORDER — NITROGLYCERIN 0.4 MG/1
0.8 TABLET SUBLINGUAL
Status: DISCONTINUED | OUTPATIENT
Start: 2025-07-11 | End: 2025-07-12 | Stop reason: HOSPADM

## 2025-07-11 RX ORDER — METOPROLOL TARTRATE 100 MG/1
100 TABLET ORAL ONCE
Status: DISCONTINUED | OUTPATIENT
Start: 2025-07-11 | End: 2025-07-12 | Stop reason: HOSPADM

## 2025-07-11 RX ORDER — METOPROLOL TARTRATE 50 MG
50 TABLET ORAL
Status: DISCONTINUED | OUTPATIENT
Start: 2025-07-11 | End: 2025-07-12 | Stop reason: HOSPADM

## 2025-07-11 RX ORDER — NITROGLYCERIN 0.4 MG/1
0.4 TABLET SUBLINGUAL
Status: DISCONTINUED | OUTPATIENT
Start: 2025-07-11 | End: 2025-07-12 | Stop reason: HOSPADM

## 2025-07-11 RX ORDER — SODIUM CHLORIDE 9 MG/ML
40 INJECTION, SOLUTION INTRAVENOUS AS NEEDED
Status: DISCONTINUED | OUTPATIENT
Start: 2025-07-11 | End: 2025-07-12 | Stop reason: HOSPADM

## 2025-07-11 RX ORDER — IVABRADINE 5 MG/1
15 TABLET, FILM COATED ORAL ONCE
Status: DISCONTINUED | OUTPATIENT
Start: 2025-07-11 | End: 2025-07-12 | Stop reason: HOSPADM

## 2025-07-11 RX ADMIN — SODIUM CHLORIDE 250 ML: 9 INJECTION, SOLUTION INTRAVENOUS at 14:45

## 2025-07-11 RX ADMIN — IOPAMIDOL 65 ML: 755 INJECTION, SOLUTION INTRAVENOUS at 15:09

## 2025-07-11 RX ADMIN — SODIUM CHLORIDE 1000 ML: 9 INJECTION, SOLUTION INTRAVENOUS at 12:57

## 2025-07-11 NOTE — NURSING NOTE
Updated Dr Orr re: NS 1250 ml IV total fluid. Reported POC Creatinine 1.18 and GFR 31. Reported blood pressure values pre procedure and after receiving NTG 0.4 mg SL. Also reviewed current medications.  Final blood pressure at departure was 106/63. Physician states he will review medications and discuss with care team.

## 2025-07-11 NOTE — NURSING NOTE
Spoke to Dr Orr re hypotension. NS fluid bolus ordered and NTG 0.4mg Sl during angiogram also ordered. Patient updated.

## 2025-07-14 ENCOUNTER — PATIENT MESSAGE (OUTPATIENT)
Age: 65
End: 2025-07-14
Payer: COMMERCIAL

## 2025-07-17 LAB — CREAT BLDA-MCNC: 1.8 MG/DL (ref 0.6–1.3)

## 2025-07-18 NOTE — PROGRESS NOTES
Subjective:     Encounter Date:07/21/2025    Patient ID: Sophie Medina is a 64 y.o.  -American female from [place], [place], formerly worked with computers.    PCP: Laurie Brito MD  CARDIOLOGIST: Lawson Slade MD  ENDOCRINOLOGIST: Nicki Ayala MD    Chief Complaint:   Chief Complaint   Patient presents with    Precordial chest pain     Problem List:  Precordial chest pain  Stress test 10/19/2019: Normal myocardial perfusion study no evidence of ischemia, EF 61%  Echocardiogram 7/11/2025: LVEF 64.5%, diastolic function normal, RVSP 22 mmHg, no significant structural or functional valvular disease  CTA coronaries 7/11/2025: Normal coronary CTA, calcium score 0  Hypertension  Mother had hypertension  Diagnosed in 2024  Intolerant to high dosed amlodipine with swelling  Patient did not want to take nifedipine because she read side effects  Hyperlipidemia  Type 2 diabetes mellitus; hemoglobin A1c 6% December 2024  Moderate obesity: BMI 37.79  CKD stage III  Asthma  Rheumatoid arthritis  Former tobacco use with 10-pack-year history  Frequent nocturnal awakening  Pulsatile tinnitus  Surgical history:  Hysterectomy  Right inguinal cyst removal    Allergies   Allergen Reactions    Amlodipine Swelling     B/l LE edema , only with 10mg dose           Current Outpatient Medications:     ADVAIR DISKUS 250-50 MCG/DOSE DISKUS, Inhale 1 puff 2 (Two) Times a Day As Needed., Disp: , Rfl: 6    alclomethasone (ACLOVATE) 0.05 % cream, Apply  topically to the appropriate area as directed Daily., Disp: , Rfl: 3    amLODIPine Besylate (NORVASC PO), Take  by mouth Every Night., Disp: , Rfl:     aspirin 81 MG EC tablet, Take 1 tablet by mouth Daily., Disp: 90 tablet, Rfl: 3    atorvastatin (LIPITOR) 20 MG tablet, TAKE 1 TABLET BY MOUTH DAILY (Patient taking differently: Take 1 tablet by mouth Every Night.), Disp: 90 tablet, Rfl: 3    Blood Glucose Monitoring Suppl device, Testing once daily, Disp: 1  each, Rfl: 0    clobetasol (TEMOVATE) 0.05 % ointment, Apply to affected area BID PRN rash, itching, Disp: 30 g, Rfl: 1    estradiol (MINIVELLE, VIVELLE-DOT) 0.1 MG/24HR patch, APPLY 1 PATCH TOPICALLY TWICE A WEEK AS DIRECTED, Disp: , Rfl:     glucose blood test strip, Testing once daily, Disp: 50 each, Rfl: 5      hydroxychloroquine (PLAQUENIL) 200 MG tablet, Take 1 tablet by mouth 2 (Two) Times a Day., Disp: , Rfl: 3    Lancets 33G misc, 1 each Daily., Disp: 50 each, Rfl: 5    levocetirizine (XYZAL) 5 MG tablet, Take 1 tablet by mouth Every Evening., Disp: , Rfl: 3    lidocaine (XYLOCAINE) 5 % ointment, APPLY TO AFFECTED AREAS AS DIRECTED., Disp: , Rfl: 5    losartan (COZAAR) 50 MG tablet, Take 1 tablet by mouth twice daily, Disp: 60 tablet, Rfl: 0    metFORMIN ER (GLUCOPHAGE-XR) 500 MG 24 hr tablet, TAKE 1 TABLET BY MOUTH DAILY WITH DINNER, Disp: 90 tablet, Rfl: 3    Multiple Vitamins-Minerals (ZINC PO), Take  by mouth., Disp: , Rfl:     PROAIR  (90 BASE) MCG/ACT inhaler, Inhale 2 puffs Every 6 (Six) Hours As Needed., Disp: , Rfl: 6    History of Present Illness  The patient is here to establish care in the hypertension clinic and is also a patient of Dr. Slade.  She has been intolerant to high dose amlodipine due to swelling.  She says she did fine on the 5 mg dose but it was not keeping her blood pressure under control.  She has not been on hydrochlorothiazide for a few weeks due to CKD.  She did not want to take nifedipine because she read the side effects.  Her mother had hypertension.  Patient denies any prior MIs, heart catheterizations, CVAs, TIAs, seizures, DVTs, PEs, or rheumatic fever.  She denies any PIH or preeclampsia during pregnancy.  She has frequent nocturnal awakening but has never had a sleep study.  She tries to limit salt use and does not use NSAIDs.  Whenever her blood pressure is high she gets a headache.  She says that remotely in the past her blood pressure got up to 200 systolic  and she had a headache but denies any visual disturbances.  She was diagnosed with hypertension last year.  She does not have a cuffed blood pressure monitor and has a wrist monitor.  She says that it typically reads her blood pressure higher than a manual reading.  She does not smoke or drink.  She only smoked for about 1-2 years about 35 years ago.  Manual blood pressure reading right arm sitting was 110/62, left arm sitting 110/60.  Patient says that she hears her heartbeat in her ear even when her blood pressure is not high.    Cardiovascular Disease Risk Factors  Hypertension, hyperlipidemia, obesity, increased age    Social History     Socioeconomic History    Marital status:     Number of children: 1   Tobacco Use    Smoking status: Former     Current packs/day: 0.00     Average packs/day: 0.5 packs/day for 10.0 years (5.0 ttl pk-yrs)     Types: Cigarettes     Start date: 10/19/1987     Quit date: 10/19/1997     Years since quittin.7     Passive exposure: Past    Smokeless tobacco: Never   Vaping Use    Vaping status: Never Used   Substance and Sexual Activity    Alcohol use: No    Drug use: No    Sexual activity: Not Currently     Partners: Male     Birth control/protection: None, Hysterectomy       Family History   Problem Relation Age of Onset    Heart attack Mother 84    Hypertension Mother     Aneurysm Mother     Arthritis Father     Diabetes Father     Dementia Father     Diabetes Maternal Grandmother     No Known Problems Maternal Grandfather     No Known Problems Paternal Grandmother     No Known Problems Paternal Grandfather     Hypertension Daughter        ROS   Obtained and negative except as outlined in problem list and HPI.    Procedures       Objective:       Vitals:    25 1307 25 1311 25 1338 25 1339   BP: 122/70 122/74 110/62 110/60   BP Location: Left arm Left arm Right arm Left arm   Patient Position: Sitting Standing Sitting Sitting   Cuff Size: Adult  "Adult     Pulse: 91 90     SpO2: 99% 99%     Weight: 93.7 kg (206 lb 9.6 oz)      Height: 157.5 cm (62\")        Body mass index is 37.79 kg/m².    Constitutional:       Appearance: Healthy appearance. Not in distress.   Neck:      Vascular: No JVR. JVD normal.   Pulmonary:      Effort: Pulmonary effort is normal.      Breath sounds: Normal breath sounds. No wheezing. No rhonchi. No rales.   Chest:      Chest wall: Not tender to palpatation.   Cardiovascular:      PMI at left midclavicular line. Normal rate. Regular rhythm. Normal S1. Normal S2.       Murmurs: There is no murmur.      No gallop.  No click. No rub.   Pulses:     Intact distal pulses.   Edema:     Ankle: bilateral trace edema of the ankle.     Feet: bilateral trace edema of the feet.  Abdominal:      General: Bowel sounds are normal.      Palpations: Abdomen is soft.      Tenderness: There is no abdominal tenderness.   Musculoskeletal: Normal range of motion.         General: No tenderness. Skin:     General: Skin is warm and dry.   Neurological:      General: No focal deficit present.      Mental Status: Alert and oriented to person, place and time.         Lab Review:   Results for orders placed or performed during the hospital encounter of 07/11/25   Adult Transthoracic Echo Complete W/ Cont if Necessary Per Protocol    Collection Time: 07/11/25  4:16 PM   Result Value Ref Range    EF(MOD-bp) 64.5 %    LVIDd 5.2 cm    LVIDs 2.10 cm    IVSd 0.80 cm    LVPWd 0.80 cm    FS 59.6 %    IVS/LVPW 1.00 cm    ESV(cubed) 9.3 ml    LV Sys Vol (BSA corrected) 16.6 cm2    EDV(cubed) 140.6 ml    LV Alvarez Vol (BSA corrected) 46.5 cm2    LV mass(C)d 145.2 grams    LVOT area 3.1 cm2    LVOT diam 2.00 cm    EDV(MOD-sp2) 66.7 ml    EDV(MOD-sp4) 89.5 ml    ESV(MOD-sp2) 24.0 ml    ESV(MOD-sp4) 32.0 ml    SV(MOD-sp2) 42.7 ml    SV(MOD-sp4) 57.5 ml    SVi(MOD-SP2) 22.2 ml/m2    SVi(MOD-SP4) 29.9 ml/m2    SVi (LVOT) 34.6 ml/m2    EF(MOD-sp2) 64.0 %    EF(MOD-sp4) 64.2 %    " MV E max isaac 101.0 cm/sec    MV A max isaac 100.0 cm/sec    MV dec time 0.23 sec    MV E/A 1.01     IVRT 85.0 ms    LA ESV Index (BP) 27.6 ml/m2    Med Peak E' Isaac 8.2 cm/sec    Lat Peak E' Isaac 10.0 cm/sec    TR max isaac 216.0 cm/sec    Avg E/e' ratio 11.10     SV(LVOT) 66.6 ml    RV Base 3.2 cm    RV Mid 2.40 cm    RV Length 6.9 cm    TAPSE (>1.6) 2.46 cm    RV S' 14.0 cm/sec    LA dimension (2D)  3.7 cm    LV V1 max 96.1 cm/sec    LV V1 max PG 3.7 mmHg    LV V1 mean PG 2.00 mmHg    LV V1 VTI 21.2 cm    Ao pk isaac 123.0 cm/sec    Ao max PG 6.1 mmHg    Ao mean PG 3.0 mmHg    Ao V2 VTI 27.1 cm    GARCIA(I,D) 2.46 cm2    Dimensionless Index 0.78 (DI)    MV max PG 4.6 mmHg    MV mean PG 3.0 mmHg    MV V2 VTI 37.5 cm    MV P1/2t 88.4 msec    MVA(P1/2t) 2.49 cm2    MVA(VTI) 1.78 cm2    MV dec slope 381.0 cm/sec2    TR max PG 18.8 mmHg    PA acc time 0.10 sec    PI end-d isaac 141.0 cm/sec    Ao root diam 3.2 cm    RVSP(TR) 22 mmHg    RAP systole 3 mmHg    BH CV VAS BP RIGHT /59 mmHg     Lab Results   Component Value Date    WBC 8.89 04/22/2025    HGB 11.4 04/22/2025    HCT 34.9 04/22/2025    MCV 87 04/22/2025     04/22/2025      Lab Results   Component Value Date    GLUCOSE 115 (H) 05/19/2025    BUN 20 05/19/2025    CREATININE 1.80 (H) 07/11/2025     05/19/2025    K 4.3 05/19/2025     05/19/2025    CALCIUM 9.9 05/19/2025    PROTEINTOT 7.0 02/20/2025    ALBUMIN 4.4 05/19/2025    ALT 14 02/20/2025    AST 30 02/20/2025    ALKPHOS 101 02/20/2025    BILITOT 0.3 02/20/2025    GLOB 3.1 02/20/2025    AGRATIO 1.3 02/20/2025    BCR 16.9 05/19/2025    ANIONGAP 11.0 05/19/2025    EGFR 52.0 (L) 05/19/2025      Lab Results   Component Value Date    CHOL 169 05/19/2025    TRIG 58 05/19/2025    HDL 56 05/19/2025     (H) 05/19/2025      Lab Results   Component Value Date    TSH 1.340 06/11/2024             Results for orders placed during the hospital encounter of 07/11/25    Adult Transthoracic Echo Complete W/  Cont if Necessary Per Protocol 07/11/2025  5:26 PM    Interpretation Summary    Left ventricular systolic function is normal. Calculated left ventricular EF = 64.5% Left ventricular ejection fraction appears to be 61 - 65%.    Left ventricular diastolic function was normal.    Estimated right ventricular systolic pressure from tricuspid regurgitation is normal (<35 mmHg). Calculated right ventricular systolic pressure from tricuspid regurgitation is 22 mmHg.    No significant structural or functional valvular disease.       Advance Care Planning   ACP discussion was held with the patient during this visit. Patient does not have an advance directive, declines further assistance.      Assessment:    Patient with hypertension and intolerance to high-dose amlodipine in the setting of CKD and type 2 diabetes mellitus.  Will order renal artery duplex to rule out JEFF.  She has frequent nocturnal awakening so I will also screen her with an outpatient sleep study.  She will get repeat labs in 1 week after medication changes below.  I will enroll patient in the hypertension care companion.  Manual blood pressure readings bilaterally in office today were around 110/60.     Diagnosis Plan   1. Primary hypertension  Duplex Renal Artery - Bilateral Complete CAR    Enroll patient in hypertension care plan    Enroll patient in hypertension care plan      2. Precordial chest pain  No recurrent angina pectoris or CHF on current activity schedule; continue current treatment       3. Mixed hyperlipidemia  Abnormal lipid panel May 2025, continue atorvastatin             Plan:     Patient to continue current medications and close follow up with the above providers.  Tentative cardiology follow up in August 2025 in the hypertension clinic or patient may return sooner PRN.   ARR, aldosterone, cortisol, plasma metanephrines and catecholamines, microalbuminemia/creatinine ratio, BMP in 1 week  Renal artery duplex  Carotid duplex  Outpatient  sleep study  Discontinue losartan  Begin telmisartan 80 mg daily  Amlodipine 5 mg nightly  Enroll patient in hypertension care companion; if blood pressure readings start elevating may consider hydralazine or doxazosin    Electronically signed by RENNY Peralta, 07/21/25, 1:50 PM EDT.

## 2025-07-21 ENCOUNTER — OFFICE VISIT (OUTPATIENT)
Dept: CARDIOLOGY | Facility: CLINIC | Age: 65
End: 2025-07-21
Payer: COMMERCIAL

## 2025-07-21 VITALS
HEART RATE: 90 BPM | HEIGHT: 62 IN | SYSTOLIC BLOOD PRESSURE: 110 MMHG | OXYGEN SATURATION: 99 % | DIASTOLIC BLOOD PRESSURE: 60 MMHG | BODY MASS INDEX: 38.02 KG/M2 | WEIGHT: 206.6 LBS

## 2025-07-21 DIAGNOSIS — E78.2 MIXED HYPERLIPIDEMIA: ICD-10-CM

## 2025-07-21 DIAGNOSIS — I10 PRIMARY HYPERTENSION: Primary | ICD-10-CM

## 2025-07-21 DIAGNOSIS — R07.2 PRECORDIAL CHEST PAIN: ICD-10-CM

## 2025-07-21 DIAGNOSIS — H93.A9 PULSATILE TINNITUS: ICD-10-CM

## 2025-07-21 PROCEDURE — 99214 OFFICE O/P EST MOD 30 MIN: CPT | Performed by: NURSE PRACTITIONER

## 2025-07-21 RX ORDER — TELMISARTAN 80 MG/1
80 TABLET ORAL DAILY
Qty: 90 TABLET | Refills: 3 | Status: SHIPPED | OUTPATIENT
Start: 2025-07-21

## 2025-07-21 RX ORDER — AMLODIPINE BESYLATE 5 MG/1
5 TABLET ORAL NIGHTLY
Qty: 90 TABLET | Refills: 3 | Status: SHIPPED | OUTPATIENT
Start: 2025-07-21

## 2025-07-23 DIAGNOSIS — I71.22 ANEURYSM OF AORTIC ARCH WITHOUT RUPTURE: Primary | ICD-10-CM

## 2025-07-24 ENCOUNTER — TELEPHONE (OUTPATIENT)
Dept: CARDIOLOGY | Facility: CLINIC | Age: 65
End: 2025-07-24
Payer: COMMERCIAL

## 2025-07-24 NOTE — TELEPHONE ENCOUNTER
PT called and LVM stating that her BP has been elevated and felt she needed to be on an additional medication.  Recent BP readings are in HTN [facility].      I called pt back to let her know that she needs to take the medication longer to notice a difference in her readings.    No answer, LVM    Please advise with any additional recommendations

## 2025-07-25 NOTE — TELEPHONE ENCOUNTER
Sent pt a my chart message with recommendations per RENNY Hernández    Concur with giving the medication a few more days

## 2025-07-30 NOTE — TELEPHONE ENCOUNTER
Rx Refill Note  Requested Prescriptions     Pending Prescriptions Disp Refills    NIFEdipine CC (ADALAT CC) 30 MG 24 hr tablet [Pharmacy Med Name: NIFEdipine ER 30 MG Oral Tablet Extended Release 24 Hour] 60 tablet 0     Sig: Take 1 tablet by mouth once daily      Last office visit with prescribing clinician: 6/5/2025   Last telemedicine visit with prescribing clinician: Visit date not found   Next office visit with prescribing clinician: Visit date not found     The original prescription was discontinued on 7/21/2025 by Rita Lyon APRN for the following reason: Side effects. Renewing this prescription may not be appropriate.       Mackenzie Johnson MA  07/30/25, 07:42 EDT    RELAY  Our records show this medication has been discontinued, is pt still taking this?

## 2025-07-31 RX ORDER — NIFEDIPINE 30 MG
30 TABLET, EXTENDED RELEASE ORAL DAILY
Qty: 60 TABLET | Refills: 0 | OUTPATIENT
Start: 2025-07-31

## 2025-08-01 ENCOUNTER — HOSPITAL ENCOUNTER (OUTPATIENT)
Dept: CARDIOLOGY | Facility: HOSPITAL | Age: 65
Discharge: HOME | End: 2025-08-01
Admitting: NURSE PRACTITIONER
Payer: COMMERCIAL

## 2025-08-01 DIAGNOSIS — I10 PRIMARY HYPERTENSION: ICD-10-CM

## 2025-08-01 LAB
BH CV ECHO MEAS - DIST REN A EDV LEFT: 28.2 CM/S
BH CV ECHO MEAS - DIST REN A PSV LEFT: 94.5 CM/S
BH CV ECHO MEAS - MID REN A EDV LEFT: 25.8 CM/S
BH CV ECHO MEAS - MID REN A PSV LEFT: 105.6 CM/S
BH CV ECHO MEAS - PROX REN A EDV LEFT: 17.6 CM/S
BH CV ECHO MEAS - PROX REN A PSV LEFT: 75.7 CM/S
BH CV VAS KIDNEY HEIGHT LEFT: 4.6 CM
BH CV VAS RENAL AORTIC MID EDV: 23.9 CM/S
BH CV VAS RENAL AORTIC MID PSV: 103.4 CM/S
BH CV VAS RENAL HILUM LEFT EDV: 11.3 CM/S
BH CV VAS RENAL HILUM LEFT PSV: 41.8 CM/S
BH CV VAS RENAL HILUM RIGHT EDV: 23.9 CM/S
BH CV VAS RENAL HILUM RIGHT PSV: 63.1 CM/S
BH CV XLRA MEAS - KID L LEFT: 9.1 CM
BH CV XLRA MEAS DIST REN A EDV RIGHT: 25.5 CM/S
BH CV XLRA MEAS DIST REN A PSV RIGHT: 79.4 CM/S
BH CV XLRA MEAS KID H RIGHT: 4 CM
BH CV XLRA MEAS KID L RIGHT: 9 CM
BH CV XLRA MEAS KID W RIGHT: 3.2 CM
BH CV XLRA MEAS MID REN A EDV RIGHT: 14.9 CM/S
BH CV XLRA MEAS MID REN A PSV RIGHT: 66 CM/S
BH CV XLRA MEAS PROX REN A EDV RIGHT: 27.8 CM/S
BH CV XLRA MEAS PROX REN A PSV RIGHT: 105.1 CM/S
BH CV XLRA MEAS RAR LEFT: 1
BH CV XLRA MEAS RAR RIGHT: 1.4
BH CV XLRA MEAS RENAL A ORG EDV LEFT: 9.9 CM/S
BH CV XLRA MEAS RENAL A ORG EDV RIGHT: 27.5 CM/S
BH CV XLRA MEAS RENAL A ORG PSV LEFT: 45.8 CM/S
BH CV XLRA MEAS RENAL A ORG PSV RIGHT: 149.5 CM/S
LEFT KIDNEY WIDTH: 4.1 CM
LEFT RENAL UPPER PARENCHYMA MAX: 26.9 CM/S
LEFT RENAL UPPER PARENCHYMA MIN: 8.8 CM/S
LEFT RENAL UPPER PARENCHYMA RI: 0.67
RIGHT RENAL UPPER PARENCHYMA MAX: 26 CM/S
RIGHT RENAL UPPER PARENCHYMA MIN: 9.2 CM/S
RIGHT RENAL UPPER PARENCHYMA RI: 0.65

## 2025-08-01 PROCEDURE — 93975 VASCULAR STUDY: CPT

## 2025-08-04 RX ORDER — DOXAZOSIN 1 MG/1
1 TABLET ORAL NIGHTLY
Qty: 90 TABLET | Refills: 2 | Status: SHIPPED | OUTPATIENT
Start: 2025-08-04

## 2025-08-08 ENCOUNTER — DOCUMENTATION (OUTPATIENT)
Dept: CARDIOLOGY | Facility: CLINIC | Age: 65
End: 2025-08-08

## 2025-08-08 ENCOUNTER — CLINICAL SUPPORT (OUTPATIENT)
Dept: CARDIOLOGY | Facility: CLINIC | Age: 65
End: 2025-08-08
Payer: COMMERCIAL

## 2025-08-08 DIAGNOSIS — R00.2 PALPITATIONS: Primary | ICD-10-CM

## 2025-08-08 RX ORDER — NEBIVOLOL 2.5 MG/1
2.5 TABLET ORAL NIGHTLY
Qty: 90 TABLET | Refills: 3 | Status: SHIPPED | OUTPATIENT
Start: 2025-08-08

## 2025-08-08 RX ORDER — LOSARTAN POTASSIUM 50 MG/1
50 TABLET ORAL 2 TIMES DAILY
Qty: 60 TABLET | Refills: 0 | OUTPATIENT
Start: 2025-08-08

## 2025-08-13 ENCOUNTER — HOSPITAL ENCOUNTER (OUTPATIENT)
Dept: CARDIOLOGY | Facility: HOSPITAL | Age: 65
Discharge: HOME | End: 2025-08-13
Payer: COMMERCIAL

## 2025-08-13 ENCOUNTER — LAB (OUTPATIENT)
Dept: LAB | Facility: HOSPITAL | Age: 65
End: 2025-08-13
Payer: COMMERCIAL

## 2025-08-13 VITALS — WEIGHT: 206.57 LBS | HEIGHT: 62 IN | BODY MASS INDEX: 38.01 KG/M2

## 2025-08-13 DIAGNOSIS — I10 PRIMARY HYPERTENSION: ICD-10-CM

## 2025-08-13 LAB
ANION GAP SERPL CALCULATED.3IONS-SCNC: 10 MMOL/L (ref 5–15)
BH CV XLRA MEAS CAROTID RIGHT ICA/CCA DIASTOLIC RATIO: 2.5
BH CV XLRA MEAS LEFT DIST CCA EDV: 24.6 CM/SEC
BH CV XLRA MEAS LEFT DIST CCA PSV: 74.2 CM/SEC
BH CV XLRA MEAS LEFT DIST ICA EDV: 36.9 CM/SEC
BH CV XLRA MEAS LEFT DIST ICA PSV: 90.8 CM/SEC
BH CV XLRA MEAS LEFT ICA/CCA DIASTOLIC RATIO: 1.6
BH CV XLRA MEAS LEFT ICA/CCA RATIO: 1.29
BH CV XLRA MEAS LEFT MID CCA EDV: 26.2 CM/SEC
BH CV XLRA MEAS LEFT MID CCA PSV: 75.8 CM/SEC
BH CV XLRA MEAS LEFT MID ICA EDV: 39.3 CM/SEC
BH CV XLRA MEAS LEFT MID ICA PSV: 95.6 CM/SEC
BH CV XLRA MEAS LEFT PROX CCA EDV: 25.4 CM/SEC
BH CV XLRA MEAS LEFT PROX CCA PSV: 86.9 CM/SEC
BH CV XLRA MEAS LEFT PROX ECA EDV: 13.5 CM/SEC
BH CV XLRA MEAS LEFT PROX ECA PSV: 70.6 CM/SEC
BH CV XLRA MEAS LEFT PROX ICA EDV: 27.8 CM/SEC
BH CV XLRA MEAS LEFT PROX ICA PSV: 82.5 CM/SEC
BH CV XLRA MEAS LEFT PROX SCLA PSV: 145.3 CM/SEC
BH CV XLRA MEAS LEFT VERTEBRAL A EDV: 23.1 CM/SEC
BH CV XLRA MEAS LEFT VERTEBRAL A PSV: 56.7 CM/SEC
BH CV XLRA MEAS RIGHT DIST CCA EDV: 21.9 CM/SEC
BH CV XLRA MEAS RIGHT DIST CCA PSV: 69.2 CM/SEC
BH CV XLRA MEAS RIGHT DIST ICA EDV: 44.1 CM/SEC
BH CV XLRA MEAS RIGHT DIST ICA PSV: 111.3 CM/SEC
BH CV XLRA MEAS RIGHT ICA/CCA RATIO: 1.83
BH CV XLRA MEAS RIGHT MID CCA EDV: 25.6 CM/SEC
BH CV XLRA MEAS RIGHT MID CCA PSV: 83.7 CM/SEC
BH CV XLRA MEAS RIGHT MID ICA EDV: 44 CM/SEC
BH CV XLRA MEAS RIGHT MID ICA PSV: 97.3 CM/SEC
BH CV XLRA MEAS RIGHT PROX CCA EDV: 17.3 CM/SEC
BH CV XLRA MEAS RIGHT PROX CCA PSV: 101 CM/SEC
BH CV XLRA MEAS RIGHT PROX ECA EDV: 20.9 CM/SEC
BH CV XLRA MEAS RIGHT PROX ECA PSV: 75.4 CM/SEC
BH CV XLRA MEAS RIGHT PROX ICA EDV: 54.7 CM/SEC
BH CV XLRA MEAS RIGHT PROX ICA PSV: 126.9 CM/SEC
BH CV XLRA MEAS RIGHT PROX SCLA PSV: 92.6 CM/SEC
BH CV XLRA MEAS RIGHT VERTEBRAL A EDV: 14.3 CM/SEC
BH CV XLRA MEAS RIGHT VERTEBRAL A PSV: 39.5 CM/SEC
BUN SERPL-MCNC: 17 MG/DL (ref 8–23)
BUN/CREAT SERPL: 11.5 (ref 7–25)
CALCIUM SPEC-SCNC: 9.4 MG/DL (ref 8.6–10.5)
CHLORIDE SERPL-SCNC: 104 MMOL/L (ref 98–107)
CO2 SERPL-SCNC: 22 MMOL/L (ref 22–29)
CORTIS SERPL-MCNC: 9.52 MCG/DL
CREAT SERPL-MCNC: 1.48 MG/DL (ref 0.57–1)
EGFRCR SERPLBLD CKD-EPI 2021: 39.6 ML/MIN/1.73
GLUCOSE SERPL-MCNC: 85 MG/DL (ref 65–99)
LEFT ARM BP: NORMAL MMHG
POTASSIUM SERPL-SCNC: 4.1 MMOL/L (ref 3.5–5.2)
RIGHT ARM BP: NORMAL MMHG
SODIUM SERPL-SCNC: 136 MMOL/L (ref 136–145)

## 2025-08-13 PROCEDURE — 36415 COLL VENOUS BLD VENIPUNCTURE: CPT

## 2025-08-13 PROCEDURE — 93880 EXTRACRANIAL BILAT STUDY: CPT

## 2025-08-13 PROCEDURE — 80048 BASIC METABOLIC PNL TOTAL CA: CPT

## 2025-08-13 PROCEDURE — 82384 ASSAY THREE CATECHOLAMINES: CPT

## 2025-08-13 PROCEDURE — 83835 ASSAY OF METANEPHRINES: CPT

## 2025-08-13 PROCEDURE — 84244 ASSAY OF RENIN: CPT

## 2025-08-13 PROCEDURE — 82533 TOTAL CORTISOL: CPT

## 2025-08-13 PROCEDURE — 82088 ASSAY OF ALDOSTERONE: CPT

## 2025-08-17 LAB
METANEPH FREE SERPL-MCNC: <25 PG/ML (ref 0–88)
NORMETANEPHRINE SERPL-MCNC: 218.5 PG/ML (ref 0–285.2)

## 2025-08-18 ENCOUNTER — TELEPHONE (OUTPATIENT)
Dept: CARDIOLOGY | Facility: CLINIC | Age: 65
End: 2025-08-18
Payer: COMMERCIAL

## 2025-08-19 LAB
ALDOST SERPL-MCNC: <1 NG/DL (ref 0–30)
ALDOST SERPL-MCNC: <1 NG/DL (ref 0–30)
ALDOST/RENIN PLAS-RTO: <1.5 {RATIO} (ref 0–30)
DOPAMINE SERPL-MCNC: 20.8 PG/ML (ref 0–36.7)
EPINEPH PLAS-MCNC: 13.9 PG/ML (ref 0–55.4)
NOREPINEPH PLAS-MCNC: 1298 PG/ML (ref 115–524)
RENIN PLAS-CCNC: 0.69 NG/ML/HR (ref 0.17–5.38)

## 2025-08-20 DIAGNOSIS — I10 ESSENTIAL HYPERTENSION: Primary | ICD-10-CM

## 2025-08-29 RX ORDER — NEBIVOLOL 5 MG/1
5 TABLET ORAL NIGHTLY
Qty: 90 TABLET | Refills: 3 | Status: SHIPPED | OUTPATIENT
Start: 2025-08-29